# Patient Record
Sex: MALE | Race: WHITE | Employment: OTHER | URBAN - METROPOLITAN AREA
[De-identification: names, ages, dates, MRNs, and addresses within clinical notes are randomized per-mention and may not be internally consistent; named-entity substitution may affect disease eponyms.]

---

## 2017-03-20 ENCOUNTER — TRANSFERRED RECORDS (OUTPATIENT)
Dept: HEALTH INFORMATION MANAGEMENT | Facility: CLINIC | Age: 47
End: 2017-03-20

## 2017-03-24 ENCOUNTER — MYC MEDICAL ADVICE (OUTPATIENT)
Dept: INTERNAL MEDICINE | Facility: CLINIC | Age: 47
End: 2017-03-24

## 2017-03-27 NOTE — TELEPHONE ENCOUNTER
Routing to Dr. Macdonald, who has seen this patient most recently and for whom this message was intended.

## 2019-01-11 ENCOUNTER — HOSPITAL ENCOUNTER (EMERGENCY)
Facility: CLINIC | Age: 49
Discharge: HOME | End: 2019-01-11
Attending: EMERGENCY MEDICINE | Admitting: EMERGENCY MEDICINE
Payer: COMMERCIAL

## 2019-01-11 VITALS
BODY MASS INDEX: 27.15 KG/M2 | HEART RATE: 67 BPM | WEIGHT: 218.4 LBS | TEMPERATURE: 98 F | RESPIRATION RATE: 7 BRPM | HEIGHT: 75 IN | OXYGEN SATURATION: 98 % | DIASTOLIC BLOOD PRESSURE: 99 MMHG | SYSTOLIC BLOOD PRESSURE: 157 MMHG

## 2019-01-11 DIAGNOSIS — I10 BENIGN ESSENTIAL HYPERTENSION: ICD-10-CM

## 2019-01-11 LAB
ANION GAP SERPL CALCULATED.3IONS-SCNC: 7 MMOL/L (ref 3–14)
BASOPHILS # BLD AUTO: 0.1 10E9/L (ref 0–0.2)
BASOPHILS NFR BLD AUTO: 0.9 %
BUN SERPL-MCNC: 19 MG/DL (ref 7–30)
CALCIUM SERPL-MCNC: 9.1 MG/DL (ref 8.5–10.1)
CHLORIDE SERPL-SCNC: 103 MMOL/L (ref 94–109)
CO2 SERPL-SCNC: 29 MMOL/L (ref 20–32)
CREAT SERPL-MCNC: 1.19 MG/DL (ref 0.66–1.25)
DIFFERENTIAL METHOD BLD: NORMAL
EOSINOPHIL # BLD AUTO: 0.2 10E9/L (ref 0–0.7)
EOSINOPHIL NFR BLD AUTO: 2.9 %
ERYTHROCYTE [DISTWIDTH] IN BLOOD BY AUTOMATED COUNT: 12.8 % (ref 10–15)
GFR SERPL CREATININE-BSD FRML MDRD: 72 ML/MIN/{1.73_M2}
GLUCOSE SERPL-MCNC: 108 MG/DL (ref 70–99)
HCT VFR BLD AUTO: 43 % (ref 40–53)
HGB BLD-MCNC: 15.2 G/DL (ref 13.3–17.7)
IMM GRANULOCYTES # BLD: 0 10E9/L (ref 0–0.4)
IMM GRANULOCYTES NFR BLD: 0 %
INTERPRETATION ECG - MUSE: NORMAL
LYMPHOCYTES # BLD AUTO: 2.3 10E9/L (ref 0.8–5.3)
LYMPHOCYTES NFR BLD AUTO: 30.4 %
MCH RBC QN AUTO: 31.7 PG (ref 26.5–33)
MCHC RBC AUTO-ENTMCNC: 35.3 G/DL (ref 31.5–36.5)
MCV RBC AUTO: 90 FL (ref 78–100)
MONOCYTES # BLD AUTO: 0.3 10E9/L (ref 0–1.3)
MONOCYTES NFR BLD AUTO: 4.2 %
NEUTROPHILS # BLD AUTO: 4.7 10E9/L (ref 1.6–8.3)
NEUTROPHILS NFR BLD AUTO: 61.6 %
NRBC # BLD AUTO: 0 10*3/UL
NRBC BLD AUTO-RTO: 0 /100
PLATELET # BLD AUTO: 236 10E9/L (ref 150–450)
POTASSIUM SERPL-SCNC: 3.7 MMOL/L (ref 3.4–5.3)
RBC # BLD AUTO: 4.8 10E12/L (ref 4.4–5.9)
SODIUM SERPL-SCNC: 139 MMOL/L (ref 133–144)
WBC # BLD AUTO: 7.6 10E9/L (ref 4–11)

## 2019-01-11 PROCEDURE — 99284 EMERGENCY DEPT VISIT MOD MDM: CPT | Mod: 25

## 2019-01-11 PROCEDURE — 93005 ELECTROCARDIOGRAM TRACING: CPT

## 2019-01-11 PROCEDURE — 96374 THER/PROPH/DIAG INJ IV PUSH: CPT

## 2019-01-11 PROCEDURE — 25000128 H RX IP 250 OP 636: Performed by: EMERGENCY MEDICINE

## 2019-01-11 PROCEDURE — 80048 BASIC METABOLIC PNL TOTAL CA: CPT | Performed by: EMERGENCY MEDICINE

## 2019-01-11 PROCEDURE — 25000132 ZZH RX MED GY IP 250 OP 250 PS 637: Performed by: EMERGENCY MEDICINE

## 2019-01-11 PROCEDURE — 85025 COMPLETE CBC W/AUTO DIFF WBC: CPT | Performed by: EMERGENCY MEDICINE

## 2019-01-11 RX ORDER — LABETALOL HYDROCHLORIDE 5 MG/ML
20 INJECTION, SOLUTION INTRAVENOUS ONCE
Status: COMPLETED | OUTPATIENT
Start: 2019-01-11 | End: 2019-01-11

## 2019-01-11 RX ORDER — METOPROLOL TARTRATE 25 MG/1
50 TABLET, FILM COATED ORAL ONCE
Status: COMPLETED | OUTPATIENT
Start: 2019-01-11 | End: 2019-01-11

## 2019-01-11 RX ORDER — AMLODIPINE BESYLATE 5 MG/1
5 TABLET ORAL DAILY
Qty: 30 TABLET | Refills: 1 | Status: SHIPPED | OUTPATIENT
Start: 2019-01-11 | End: 2019-01-17

## 2019-01-11 RX ADMIN — LABETALOL HYDROCHLORIDE 20 MG: 5 INJECTION INTRAVENOUS at 20:03

## 2019-01-11 RX ADMIN — METOPROLOL TARTRATE 50 MG: 25 TABLET ORAL at 21:04

## 2019-01-11 ASSESSMENT — ENCOUNTER SYMPTOMS
SHORTNESS OF BREATH: 0
ABDOMINAL PAIN: 0
HEADACHES: 0

## 2019-01-11 ASSESSMENT — MIFFLIN-ST. JEOR
SCORE: 1919.54
SCORE: 1951.29

## 2019-01-11 NOTE — ED AVS SNAPSHOT
Emergency Department  [address] [place] [id number]  Phone:  [phone]  Fax:  [phone]                                    Lev Olvera   MRN: [id number]    Department:   Emergency Department   Date of Visit:  1/11/2019           After Visit Summary Signature Page    I have received my discharge instructions, and my questions have been answered. I have discussed any challenges I see with this plan with the nurse or doctor.    ..........................................................................................................................................  Patient/Patient Representative Signature      ..........................................................................................................................................  Patient Representative Print Name and Relationship to Patient    ..................................................               ................................................  Date                                   Time    ..........................................................................................................................................  Reviewed by Signature/Title    ...................................................              ..............................................  Date                                               Time          22EPIC Rev 08/18

## 2019-01-12 NOTE — ED PROVIDER NOTES
"  History     Chief Complaint:  Hypertension       HPI   Lev Olvera is a 48 year old male with a family history of CAD and hypertension who presents with his wife for evaluation of hypertension. The patient does not have a known personal history of hypertension, CAD, heart attack, or kidney problems. Of note, 2-3 years ago his primary physician noted that his blood pressure was borderline at 140/90. The patient denies any daily headaches, blurred vision, chest pain, shortness of breath, abdominal pain. He states that he returned home and mentioned to his wife that his ears were ringing so she took his blood pressure given that she is a health care professional and noticed that his blood pressure was elevated. The patient has a \"collapsed vein\" in his right leg for which he wears a compression stocking but also has leg swelling in his left leg.       Allergies:  Penicillins  Trazodone     Medications:    The patient is not currently taking any prescribed medications.    Past Medical History:    The patient does not have any past pertinent medical history.    Past Surgical History:    Tooth extraction    Family History:    Hypertension  CAD    Social History:  Smoking status: Never smoker  Alcohol use: Yes  Marital Status:         Review of Systems   Eyes: Negative for visual disturbance.   Respiratory: Negative for shortness of breath.    Cardiovascular: Positive for leg swelling. Negative for chest pain.   Gastrointestinal: Negative for abdominal pain.   Neurological: Negative for headaches.   All other systems reviewed and are negative.        Physical Exam     Patient Vitals for the past 24 hrs:   BP Temp Temp src Pulse Heart Rate Resp SpO2 Height Weight   01/11/19 2030 (!) 157/99 -- -- 67 67 (!) 7 -- -- --   01/11/19 2010 (!) 173/108 -- -- 71 77 19 -- -- --   01/11/19 2000 (!) 195/122 -- -- 74 74 13 -- -- --   01/11/19 1920 (!) 218/122 98  F (36.7  C) Oral -- 80 20 98 % 1.905 m (6' 3\") 99.1 kg (218 lb " "6.4 oz)   01/11/19 1919 (!) 218/122 -- -- 80 -- 16 98 % 1.854 m (6' 1\") 99.1 kg (218 lb 6.4 oz)         Physical Exam  General: Resting comfortably on the gurney  Head:  The scalp, face, and head appear normal  Eyes:  The pupils are equal, round, and reactive to light    There is no nystagmus    Extraocular muscles are intact    Conjunctivae and sclerae are normal  ENT:    The nose is normal    Pinnae are normal    The oropharynx is normal    Uvula is in the midline  Neck:  Normal range of motion    There is no rigidity noted    There is no midline cervical spine pain/tenderness    Trachea is in the midline    No mass is detected  CV:  Regular rate and underlying rhythm     Normal S1/S2, no S3/S4    No pathological murmur detected  Resp:  Lungs are clear    There is no tachypnea    Non-labored    No rales    No wheezing   GI:  Abdomen is soft, there is no rigidity    No distension    No tympani    No rebound tenderness     Non-surgical without peritoneal features  MS:  Normal muscular tone    Symmetric motor strength    No major joint effusions    No asymmetric leg swelling, no calf tenderness    1+ pretibial edema bilaterally   Skin:  No rash or acute skin lesions noted  Neuro: Speech is normal and fluent  Psych:  Awake. Alert.      Normal affect.  Appropriate interactions.  Lymph: No anterior cervical lymphadenopathy noted      Emergency Department Course   ECG (19:25:13):  Rate 77 bpm. NY interval 122. QRS duration 104. QT/QTc 374/423. P-R-T axes 65 24 62. Normal sinus rhythm, possible left atrial enlargement, Left ventricular hypertrophy, Abnormal ECG,  Interpreted at 2049 by Jaren Wheat MD.      Laboratory:  BMP: Glucose 108, WNL (Creatinine 1.19)  CBC: WNL (WBC 7.6, HGB 15.2, )    Interventions:  2003: Labetalol 20 mg IV  2044: Metoprolol 50 mg IV       Emergency Department Course:  Past medical records, nursing notes, and vitals reviewed.  1946: I performed an exam of the patient and obtained " history, as documented above.     IV inserted and blood drawn.    2041: I rechecked the patient.  Findings and plan explained to the Patient. Patient discharged home with instructions regarding supportive care, medications, and reasons to return. The importance of close follow-up was reviewed.     Impression & Plan      Medical Decision Making:  This patient presents with new onset hypertension.  He has a family history.  He is otherwise asymptomatic.  Screening laboratories show mild chronic renal impairment, this may be a result of untreated hypertension.  Other laboratories are normal.  Patient's blood pressure responded nicely to 1 dose of labetalol.  Blood pressure was approximately 150/99 after this dose.  I do not wish to drop his blood pressure any further.  I will start him on an antihypertensive agent at this time.  He does have mild 1+ bilateral pretibial edema which is chronic, this is useful information in the context of selecting future antihypertensive agents.  I will hold off on a diuretic at this time.  We will start on 5 mg of amlodipine and see where that gets us, a blood pressure log will be kept and follow-up with internal medicine is indicated.      Critical Care time:  none    Diagnosis:    ICD-10-CM    1. Benign essential hypertension I10    Hypertensive Urgency.    Disposition:  discharged to home    Discharge Medications:     Medication List      Started    amLODIPine 5 MG tablet  Commonly known as:  NORVASC  5 mg, Oral, DAILY              Tano Lees  1/11/2019    EMERGENCY DEPARTMENT    Scribe Disclosure:  I, Tano Lees, am serving as a scribe at 7:46 PM on 1/11/2019 to document services personally performed by Jaren Wheat MD based on my observations and the provider's statements to me.          Jaren Wheat MD  01/11/19 0554

## 2019-01-12 NOTE — ED NOTES
Bed: ED04  Expected date:   Expected time:   Means of arrival:   Comments:  Triage hypertension (after EKG)

## 2019-01-12 NOTE — DISCHARGE INSTRUCTIONS
Discharge Instructions  Hypertension - High Blood Pressure    During you visit to the Emergency Department, your blood pressure was higher than the recommended blood pressure.  This may be related to stress, pain, medication or other temporary conditions. In these cases, your blood pressure may return to normal on its own. If you have a history of high blood pressure, you may need to have your provider adjust your medications. Sometimes, your high measurement here may indicate that you have developed high blood pressure that will stay high unless it is treated. As a general rule, high blood pressure causes problems over years rather than days, weeks, or months. So, while it is important to treat blood pressure, it is rarely important to treat blood pressure immediately. Occasionally we will begin a medication in the Emergency Department; more often we will recommend close follow-up for medications with a primary doctor/clinic.    Generally, every Emergency Department visit should have a follow-up clinic visit with either a primary or a specialty clinic/provider. Please follow-up as instructed by your emergency provider today.    Return to the Emergency Department if you start to have:  A severe headache.  Chest pain.  Shortness of breath.  Weakness or numbness that affects one part of the body.  Confusion.  Vision changes.  Significant swelling of legs and/or eyes.  A reaction to any medication started in the Emergency Department.    What can I do to help myself?  Avoid alcohol.  Take any blood pressure medicine that you are prescribed.  Get a good night?s sleep.  Lower your salt intake.  Exercise.  Lose weight.  Manage stress.  See your doctor regularly    If blood pressure medication was started in the Emergency Department:  The medicine may not have an immediate effect. The body and brain determine what blood pressure you have. The medicine?s job is to retrain the body?s ?thermostat? to a lower blood  pressure.  You will need to follow up with your provider to see how this medicine is working for you.  If you were given a prescription for medicine here today, be sure to read all of the information (including the package insert) that comes with your prescription.  This will include important information about the medicine, its side effects, and any warnings that you need to know about.  The pharmacist who fills the prescription can provide more information and answer questions you may have about the medicine.  If you have questions or concerns that the pharmacist cannot address, please call or return to the Emergency Department.   Remember that you can always come back to the Emergency Department if you are not able to see your regular provider in the amount of time listed above, if you get any new symptoms, or if there is anything that worries you.      These keep a blood pressure log and bring into primary care

## 2019-01-17 ENCOUNTER — OFFICE VISIT (OUTPATIENT)
Dept: INTERNAL MEDICINE | Facility: CLINIC | Age: 49
End: 2019-01-17
Payer: COMMERCIAL

## 2019-01-17 ENCOUNTER — MYC MEDICAL ADVICE (OUTPATIENT)
Dept: INTERNAL MEDICINE | Facility: CLINIC | Age: 49
End: 2019-01-17

## 2019-01-17 VITALS
SYSTOLIC BLOOD PRESSURE: 173 MMHG | DIASTOLIC BLOOD PRESSURE: 100 MMHG | HEART RATE: 87 BPM | BODY MASS INDEX: 26.82 KG/M2 | WEIGHT: 214.6 LBS | TEMPERATURE: 97.8 F | RESPIRATION RATE: 16 BRPM | OXYGEN SATURATION: 98 %

## 2019-01-17 DIAGNOSIS — I10 BENIGN ESSENTIAL HYPERTENSION: Primary | ICD-10-CM

## 2019-01-17 PROCEDURE — 99214 OFFICE O/P EST MOD 30 MIN: CPT | Performed by: INTERNAL MEDICINE

## 2019-01-17 RX ORDER — LISINOPRIL 20 MG/1
20 TABLET ORAL DAILY
Qty: 30 TABLET | Refills: 3 | Status: SHIPPED | OUTPATIENT
Start: 2019-01-17 | End: 2019-05-18

## 2019-01-17 RX ORDER — ATENOLOL 50 MG/1
50 TABLET ORAL DAILY
Qty: 30 TABLET | Refills: 3 | Status: SHIPPED | OUTPATIENT
Start: 2019-01-17 | End: 2019-01-18

## 2019-01-17 NOTE — PROGRESS NOTES
SUBJECTIVE:   Lev Olvera is a 49 year old male who presents to clinic today for the following health issues:      ED/UC Followup:    Facility: [facility]  Date of visit: 2019  Reason for visit: Hypertension  Current Status: BP is still around 170/90. On the 6th day of amlodipine             Problem list and histories reviewed & adjusted, as indicated.  Additional history: as documented    Patient here for follow-up ER visit of nearly 1 week ago.  Patient had no previous diagnosed medical history, but was feeling severely on edge at home on .  This prompted his wife (rad tech [employer]) to check his blood pressure at home, and it was found to be 220/120.  He was seen in the ER, where blood pressure was similarly elevated to 170s-180 systolic.  EKG showed left ventricular hypertrophy changes, CBC and chem panel were unremarkable.  He was given a prescription for amlodipine, 5 mg daily and discharged.  He is here today having taken that medication for a week, with no appreciable change in his blood pressure.    Feels well today, no further headaches, no chest pain, no reduction in exertional capacity.  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  He has made a concerted effort in the last week to improve his diet, namely reducing salt intake.  He drinks alcohol socially, not daily.    Does have family history of hypertension in his father and possibly 1 of his siblings.  His father  of a heart attack in his 60s.    Reviewed and updated as needed this visit by clinical staff  Tobacco  Allergies  Meds  Problems  Med Hx  Surg Hx  Fam Hx       Reviewed and updated as needed this visit by Provider  Tobacco  Allergies  Meds  Problems  Med Hx  Surg Hx  Fam Hx         ROS:  Constitutional, HEENT, cardiovascular, pulmonary, GI, , musculoskeletal, neuro, skin, endocrine and psych systems are negative, except as otherwise  noted.    OBJECTIVE:     BP (!) 173/100   Pulse 87   Temp 97.8  F (36.6  C) (Oral)   Resp 16   Wt 97.3 kg (214 lb 9.6 oz)   SpO2 98%   BMI 26.82 kg/m    Body mass index is 26.82 kg/m .  GENERAL: healthy, alert and no distress  NECK: no adenopathy, no asymmetry, masses, or scars and thyroid normal to palpation  RESP: lungs clear to auscultation - no rales, rhonchi or wheezes  CV: regular rate and rhythm, normal S1 S2, no S3 or S4, no murmur, click or rub, no peripheral edema and peripheral pulses strong  ABDOMEN: soft, nontender, no hepatosplenomegaly, no masses and bowel sounds normal  MS: no gross musculoskeletal defects noted, no edema        ASSESSMENT/PLAN:     1. Benign essential hypertension  Discussed rationale for treating hypertension, including prevention of atherosclerotic vascular disease and endorgan damage.  Stop amlodipine, will start lisinopril and atenolol once daily.  Return in 3-4 weeks with previsit fasting labs as ordered below.  Given EKG changes we will check transthoracic echo.  Suspect mostly family history and lifestyle contributing to hypertension, but may need to consider workup for secondary hypertension depending on response to medications.  - lisinopril (PRINIVIL/ZESTRIL) 20 MG tablet; Take 1 tablet (20 mg) by mouth daily  Dispense: 30 tablet; Refill: 3  - atenolol (TENORMIN) 50 MG tablet; Take 1 tablet (50 mg) by mouth daily  Dispense: 30 tablet; Refill: 3  - Lipid panel reflex to direct LDL Fasting; Future  - TSH with free T4 reflex; Future  - Comprehensive metabolic panel; Future  - UA with Microscopic reflex to Culture; Future  - Echocardiogram Complete; Future          See Patient Instructions    Deejay De Los Santos MD  [facility]

## 2019-01-17 NOTE — PATIENT INSTRUCTIONS
- Stop taking amlodipine.  - Start taking lisinopril and atenolol once daily.  (Prescriptions have been sent to your pharmacy)     - Cardiology Clinic will contact you to schedule your cardiac echo.  Please contact us if you do not hear from them.     - Please follow-up with me in clinic in 1 month.  - Please come in for fasting labs, a few days prior to the appointment with me.

## 2019-01-17 NOTE — TELEPHONE ENCOUNTER
Called patient. /100 this AM after taking BP med. Asymptomatic. Denies chest pain, dizziness, headache, SOB, etc. Feeling good. BP has been around 170/100 since leaving ER. Appt made for today with Dr. De Los Santos.     Guideline used:  Telephone Triage Protocols for Nurses, Fifth Edition, Charis Peoples RN

## 2019-01-18 ENCOUNTER — MYC MEDICAL ADVICE (OUTPATIENT)
Dept: INTERNAL MEDICINE | Facility: CLINIC | Age: 49
End: 2019-01-18

## 2019-01-18 DIAGNOSIS — I10 BENIGN ESSENTIAL HYPERTENSION: ICD-10-CM

## 2019-01-18 RX ORDER — ATENOLOL 50 MG/1
25 TABLET ORAL DAILY
Qty: 30 TABLET | Refills: 3 | COMMUNITY
Start: 2019-01-18 | End: 2019-03-13 | Stop reason: ALTCHOICE

## 2019-01-18 NOTE — TELEPHONE ENCOUNTER
Provider please review Mychart message regarding BP improvement.     Patient wondering if he should continue current dose or perhaps lower the dose as he felt slight dizziness. -    -Currently on 50mg of Atenolol    BP Readings from Last 3 Encounters:   01/17/19 (!) 173/100   01/11/19 (!) 157/99   09/15/15 124/86       Deana SANCHEZ RN, BSN, PHN

## 2019-01-23 ENCOUNTER — HOSPITAL ENCOUNTER (OUTPATIENT)
Dept: CARDIOLOGY | Facility: CLINIC | Age: 49
Discharge: HOME | End: 2019-01-23
Attending: INTERNAL MEDICINE | Admitting: INTERNAL MEDICINE
Payer: COMMERCIAL

## 2019-01-23 DIAGNOSIS — I10 BENIGN ESSENTIAL HYPERTENSION: ICD-10-CM

## 2019-01-23 PROCEDURE — 25500064 ZZH RX 255 OP 636: Performed by: INTERNAL MEDICINE

## 2019-01-23 PROCEDURE — 40000264 ECHOCARDIOGRAM COMPLETE

## 2019-01-23 PROCEDURE — 93306 TTE W/DOPPLER COMPLETE: CPT | Mod: 26 | Performed by: INTERNAL MEDICINE

## 2019-01-23 RX ADMIN — HUMAN ALBUMIN MICROSPHERES AND PERFLUTREN 4 ML: 10; .22 INJECTION, SOLUTION INTRAVENOUS at 16:03

## 2019-01-24 ENCOUNTER — MYC MEDICAL ADVICE (OUTPATIENT)
Dept: INTERNAL MEDICINE | Facility: CLINIC | Age: 49
End: 2019-01-24

## 2019-01-24 DIAGNOSIS — R06.83 SNORING: Primary | ICD-10-CM

## 2019-02-25 ENCOUNTER — MYC MEDICAL ADVICE (OUTPATIENT)
Dept: INTERNAL MEDICINE | Facility: CLINIC | Age: 49
End: 2019-02-25

## 2019-03-12 ENCOUNTER — OFFICE VISIT (OUTPATIENT)
Dept: SLEEP MEDICINE | Facility: CLINIC | Age: 49
End: 2019-03-12
Attending: INTERNAL MEDICINE
Payer: COMMERCIAL

## 2019-03-12 VITALS
HEIGHT: 75 IN | OXYGEN SATURATION: 98 % | SYSTOLIC BLOOD PRESSURE: 148 MMHG | RESPIRATION RATE: 16 BRPM | HEART RATE: 51 BPM | BODY MASS INDEX: 26.61 KG/M2 | WEIGHT: 214 LBS | TEMPERATURE: 97.5 F | DIASTOLIC BLOOD PRESSURE: 90 MMHG

## 2019-03-12 DIAGNOSIS — R29.818 SUSPECTED SLEEP APNEA: Primary | ICD-10-CM

## 2019-03-12 DIAGNOSIS — G47.30 OBSERVED SLEEP APNEA: ICD-10-CM

## 2019-03-12 DIAGNOSIS — G47.19 EXCESSIVE DAYTIME SLEEPINESS: ICD-10-CM

## 2019-03-12 DIAGNOSIS — R06.83 SNORING: ICD-10-CM

## 2019-03-12 PROCEDURE — 99203 OFFICE O/P NEW LOW 30 MIN: CPT | Performed by: INTERNAL MEDICINE

## 2019-03-12 ASSESSMENT — MIFFLIN-ST. JEOR: SCORE: 1926.33

## 2019-03-12 NOTE — NURSING NOTE
"Chief Complaint   Patient presents with     Sleep Problem     Excessive daytime sleepiness.       Initial /90   Pulse 51   Temp 97.5  F (36.4  C) (Oral)   Resp 16   Ht 1.905 m (6' 3\")   Wt 97.1 kg (214 lb)   SpO2 98%   BMI 26.75 kg/m   Estimated body mass index is 26.75 kg/m  as calculated from the following:    Height as of this encounter: 1.905 m (6' 3\").    Weight as of this encounter: 97.1 kg (214 lb).    Medication Reconciliation: complete     ESS 12  Neck 44cm  Paola Guillen        "

## 2019-03-12 NOTE — PATIENT INSTRUCTIONS
Your BMI is Body mass index is 26.75 kg/m .  Weight management is a personal decision.  If you are interested in exploring weight loss strategies, the following discussion covers the approaches that may be successful. Body mass index (BMI) is one way to tell whether you are at a healthy weight, overweight, or obese. It measures your weight in relation to your height.  A BMI of 18.5 to 24.9 is in the healthy range. A person with a BMI of 25 to 29.9 is considered overweight, and someone with a BMI of 30 or greater is considered obese. More than two-thirds of American adults are considered overweight or obese.  Being overweight or obese increases the risk for further weight gain. Excess weight may lead to heart disease and diabetes.  Creating and following plans for healthy eating and physical activity may help you improve your health.  Weight control is part of healthy lifestyle and includes exercise, emotional health, and healthy eating habits. Careful eating habits lifelong are the mainstay of weight control. Though there are significant health benefits from weight loss, long-term weight loss with diet alone may be very difficult to achieve- studies show long-term success with dietary management in less than 10% of people. Attaining a healthy weight may be especially difficult to achieve in those with severe obesity. In some cases, medications, devices and surgical management might be considered.  What can you do?  If you are overweight or obese and are interested in methods for weight loss, you should discuss this with your provider.     Consider reducing daily calorie intake by 500 calories.     Keep a food journal.     Avoiding skipping meals, consider cutting portions instead.    Diet combined with exercise helps maintain muscle while optimizing fat loss. Strength training is particularly important for building and maintaining muscle mass. Exercise helps reduce stress, increase energy, and improves fitness.  Increasing exercise without diet control, however, may not burn enough calories to loose weight.       Start walking three days a week 10-20 minutes at a time    Work towards walking thirty minutes five days a week     Eventually, increase the speed of your walking for 1-2 minutes at time    In addition, we recommend that you review healthy lifestyles and methods for weight loss available through the National Institutes of Health patient information sites:  http://win.niddk.nih.gov/publications/index.htm    And look into health and wellness programs that may be available through your health insurance provider, employer, local community center, or hima club.    Weight management plan: Patient was referred to their PCP to discuss a diet and exercise plan.

## 2019-03-12 NOTE — PROGRESS NOTES
Sleep Consultation:    Date on this visit: 3/12/2019    Lev Olvera is sent by Deejay De Los Santos for a sleep consultation regarding snoring and possible sleep apnea.    Primary Physician: Deejay De Los Santos     Chief complaint: snoring, sleepiness     Presenting History:     Lev Olvera reports nightly snoring and poor quality of sleep and frequent daytime sleepiness for last 3 years.     Lev does snore every night. Patient does have a regular bed partner. There is report of snoring, gasping, snorting and poor quality of sleep.  He does have witnessed apneas. They occasionally sleep separately.  Patient sleeps on his back, side and stomach. He has frequent morning dry mouth, denies no morning headaches and restless legs. Lev denies any bruxism, sleep walking, sleep talking, dream enactment, sleep paralysis, cataplexy and hypnogogic/hypnopompic hallucinations.    Lev goes to sleep at 11:30 PM during the week. He wakes up at 6:30 AM with an alarm. He falls asleep in 10 minutes.  Lev denies difficulty falling asleep.  He wakes up 2-4 times a night for 10 minutes before falling back to sleep.  Lev wakes up to uncertain reasons and snort arousals.  On weekends, Lev goes to sleep at 11:30 PM.  He wakes up at 9:00 AM without an alarm. He falls asleep in 10 minutes.  Patient gets an average of 7 hours of sleep per night.     Lev does not do shift work.      He denies sleep walking as a child.  Lev has difficulty breathing through his nose.      Patient's [place] Sleepiness score 12/24 consistent with mild daytime sleepiness.      Lev naps 3-4 times per week for 30-60 minutes, feels refreshed after naps. He takes no inadvertant naps.  He denies closing eyes, dozing and falling asleep while driving. Patient was counseled on the importance of driving while alert, to pull over if drowsy, or nap before getting into the vehicle if sleepy.      He uses no caffeine.     Allergies:    Allergies   Allergen  Reactions     Penicillins Hives     Trazodone Hcl      dizzy       Medications:    Current Outpatient Medications   Medication Sig Dispense Refill     atenolol (TENORMIN) 50 MG tablet Take 0.5 tablets (25 mg) by mouth daily 30 tablet 3     lisinopril (PRINIVIL/ZESTRIL) 20 MG tablet Take 1 tablet (20 mg) by mouth daily 30 tablet 3       Problem List:  Patient Active Problem List    Diagnosis Date Noted     Benign essential hypertension 01/17/2019     Priority: Medium        Past Medical/Surgical History:  No past medical history on file.  Past Surgical History:   Procedure Laterality Date     SURGICAL HISTORY OF -       Tooth extraction       Social History:  Social History     Socioeconomic History     Marital status:      Spouse name: Not on file     Number of children: 0     Years of education: Not on file     Highest education level: Not on file   Occupational History     Not on file   Social Needs     Financial resource strain: Not on file     Food insecurity:     Worry: Not on file     Inability: Not on file     Transportation needs:     Medical: Not on file     Non-medical: Not on file   Tobacco Use     Smoking status: Never Smoker     Smokeless tobacco: Never Used   Substance and Sexual Activity     Alcohol use: Yes     Alcohol/week: 0.0 oz     Comment: occasional     Drug use: Not on file     Sexual activity: Yes     Partners: Female   Lifestyle     Physical activity:     Days per week: Not on file     Minutes per session: Not on file     Stress: Not on file   Relationships     Social connections:     Talks on phone: Not on file     Gets together: Not on file     Attends Caodaism service: Not on file     Active member of club or organization: Not on file     Attends meetings of clubs or organizations: Not on file     Relationship status: Not on file     Intimate partner violence:     Fear of current or ex partner: Not on file     Emotionally abused: Not on file     Physically abused: Not on file      Forced sexual activity: Not on file   Other Topics Concern     Parent/sibling w/ CABG, MI or angioplasty before 65F 55M? Not Asked   Social History Narrative     Not on file       Family History:  Family History   Problem Relation Age of Onset     Family History Negative Mother         alive age 60  Hx Ulcers     Hypertension Father      Coronary Artery Disease Father      Hypertension Brother         alive age 35     Family History Negative Sister         alive age 38     Glaucoma No family hx of      Macular Degeneration No family hx of        Review of Systems:  A complete review of systems reviewed by me is negative with the exeption of what has been mentioned in the history of present illness.  CONSTITUTIONAL: NEGATIVE for weight gain/loss, fever, chills, sweats or night sweats, drug allergies.  EYES: NEGATIVE for changes in vision, blind spots, double vision.  ENT: NEGATIVE for ear pain, sore throat, sinus pain, post-nasal drip, runny nose, bloody nose  CARDIAC: NEGATIVE for fast heartbeats or fluttering in chest, chest pain or pressure, breathlessness when lying flat, swollen legs or swollen feet.  NEUROLOGIC: NEGATIVE headaches, weakness or numbness in the arms or legs.  DERMATOLOGIC: NEGATIVE for rashes, new moles or change in mole(s)  PULMONARY: NEGATIVE SOB at rest, SOB with activity, dry cough, productive cough, coughing up blood, wheezing or whistling when breathing.    GASTROINTESTINAL: NEGATIVE for nausea or vomitting, loose or watery stools, fat or grease in stools, constipation, abdominal pain, bowel movements black in color or blood noted.  GENITOURINARY: NEGATIVE for pain during urination, blood in urine, urinating more frequently than usual, irregular menstrual periods.  MUSCULOSKELETAL: NEGATIVE for muscle pain, bone or joint pain, swollen joints.  ENDOCRINE: NEGATIVE for increased thirst or urination, diabetes.  LYMPHATIC: NEGATIVE for swollen lymph nodes, lumps or bumps in the breasts or  "nipple discharge.    Physical Examination:  Vitals: /90   Pulse 51   Temp 97.5  F (36.4  C) (Oral)   Resp 16   Ht 1.905 m (6' 3\")   Wt 97.1 kg (214 lb)   SpO2 98%   BMI 26.75 kg/m    BMI= Body mass index is 26.75 kg/m .    Neck Cir (cm): 44 cm    [place] Total Score 3/12/2019   Total score - [place] 12       JUDIE Total Score: 13 (03/12/19 1519)    GENERAL APPEARANCE: healthy, alert and no distress  EYES: Eyes grossly normal to inspection, PERRL and conjunctivae and sclerae normal  HENT: nose and mouth without ulcers or lesions, oropharynx crowded, uvula elongated and soft palate dependent  NECK: no adenopathy, no asymmetry, masses, or scars and thyroid normal to palpation  RESP: lungs clear to auscultation - no rales, rhonchi or wheezes  CV: regular rates and rhythm, normal S1 S2, no S3 or S4 and no murmur, click or rub  ABDOMEN: soft, nontender, without hepatosplenomegaly or masses and bowel sounds normal  MS: extremities normal- no gross deformities noted  NEURO: Normal strength and tone, mentation intact and speech normal  PSYCH: mentation appears normal and affect normal/bright  Mallampati Class: IV.  Tonsillar Stage: 1  hidden by pillars.    Impression/Plan:    1.  Probable obstructive sleep apnea   2. Hypertension   3. Excessive daytime sleepiness     - Patient, 49 years old male, with BMI 26, neck circumference 44 cm, presents with history of loud snoring, witnessed apneas and daytime sleepiness. Medical comorbidity includes hypertension. There is a high risk for sleep apnea and an overnight sleep study is recommended for evaluation.     Plan:     1. Split night PSG for evaluation of sleep apnea     He will follow up with me in approximately two weeks after his sleep study has been competed to review the results and discuss plan of care.       Polysomnography reviewed.  Obstructive sleep apnea reviewed.  Complications of untreated sleep apnea were reviewed.    Dr. Torsten Lundberg     CC: Deejay Poesy " Filiberto

## 2019-03-13 ENCOUNTER — OFFICE VISIT (OUTPATIENT)
Dept: INTERNAL MEDICINE | Facility: CLINIC | Age: 49
End: 2019-03-13
Payer: COMMERCIAL

## 2019-03-13 VITALS
OXYGEN SATURATION: 98 % | WEIGHT: 215 LBS | RESPIRATION RATE: 16 BRPM | BODY MASS INDEX: 26.87 KG/M2 | SYSTOLIC BLOOD PRESSURE: 119 MMHG | TEMPERATURE: 98 F | DIASTOLIC BLOOD PRESSURE: 90 MMHG | HEART RATE: 51 BPM

## 2019-03-13 DIAGNOSIS — I10 BENIGN ESSENTIAL HYPERTENSION: Primary | ICD-10-CM

## 2019-03-13 LAB
ALBUMIN SERPL-MCNC: 4.5 G/DL (ref 3.4–5)
ALBUMIN UR-MCNC: NEGATIVE MG/DL
ALP SERPL-CCNC: 87 U/L (ref 40–150)
ALT SERPL W P-5'-P-CCNC: 36 U/L (ref 0–70)
ANION GAP SERPL CALCULATED.3IONS-SCNC: 5 MMOL/L (ref 3–14)
APPEARANCE UR: CLEAR
AST SERPL W P-5'-P-CCNC: 28 U/L (ref 0–45)
BACTERIA #/AREA URNS HPF: ABNORMAL /HPF
BILIRUB SERPL-MCNC: 0.6 MG/DL (ref 0.2–1.3)
BILIRUB UR QL STRIP: NEGATIVE
BUN SERPL-MCNC: 23 MG/DL (ref 7–30)
CALCIUM SERPL-MCNC: 9.4 MG/DL (ref 8.5–10.1)
CHLORIDE SERPL-SCNC: 105 MMOL/L (ref 94–109)
CO2 SERPL-SCNC: 27 MMOL/L (ref 20–32)
COLOR UR AUTO: YELLOW
CREAT SERPL-MCNC: 1.16 MG/DL (ref 0.66–1.25)
GFR SERPL CREATININE-BSD FRML MDRD: 74 ML/MIN/{1.73_M2}
GLUCOSE SERPL-MCNC: 96 MG/DL (ref 70–99)
GLUCOSE UR STRIP-MCNC: NEGATIVE MG/DL
HGB UR QL STRIP: NEGATIVE
KETONES UR STRIP-MCNC: NEGATIVE MG/DL
LEUKOCYTE ESTERASE UR QL STRIP: NEGATIVE
NITRATE UR QL: NEGATIVE
PH UR STRIP: 6 PH (ref 5–7)
POTASSIUM SERPL-SCNC: 4.4 MMOL/L (ref 3.4–5.3)
PROT SERPL-MCNC: 8.2 G/DL (ref 6.8–8.8)
RBC #/AREA URNS AUTO: ABNORMAL /HPF
SODIUM SERPL-SCNC: 137 MMOL/L (ref 133–144)
SOURCE: ABNORMAL
SP GR UR STRIP: 1.02 (ref 1–1.03)
T4 FREE SERPL-MCNC: 0.82 NG/DL (ref 0.76–1.46)
TSH SERPL DL<=0.005 MIU/L-ACNC: 6.72 MU/L (ref 0.4–4)
UROBILINOGEN UR STRIP-ACNC: 0.2 EU/DL (ref 0.2–1)
WBC #/AREA URNS AUTO: ABNORMAL /HPF

## 2019-03-13 PROCEDURE — 80053 COMPREHEN METABOLIC PANEL: CPT | Performed by: INTERNAL MEDICINE

## 2019-03-13 PROCEDURE — 84443 ASSAY THYROID STIM HORMONE: CPT | Performed by: INTERNAL MEDICINE

## 2019-03-13 PROCEDURE — 99214 OFFICE O/P EST MOD 30 MIN: CPT | Performed by: INTERNAL MEDICINE

## 2019-03-13 PROCEDURE — 36415 COLL VENOUS BLD VENIPUNCTURE: CPT | Performed by: INTERNAL MEDICINE

## 2019-03-13 PROCEDURE — 84439 ASSAY OF FREE THYROXINE: CPT | Performed by: INTERNAL MEDICINE

## 2019-03-13 PROCEDURE — 81001 URINALYSIS AUTO W/SCOPE: CPT | Performed by: INTERNAL MEDICINE

## 2019-03-13 RX ORDER — AMLODIPINE BESYLATE 5 MG/1
5 TABLET ORAL DAILY
Qty: 30 TABLET | Refills: 3 | Status: SHIPPED | OUTPATIENT
Start: 2019-03-13 | End: 2019-08-18

## 2019-03-13 NOTE — PROGRESS NOTES
SUBJECTIVE:   Lev Olvera is a 49 year old male who presents to clinic today for the following health issues:      Hypertension Follow-up      Outpatient blood pressures are being checked at home.  Results are 132/85 and sometimes less    Low Salt Diet: no added salt      Amount of exercise or physical activity: count steps 8000 to 59904 steps a day    Problems taking medications regularly: No    Medication side effects: none    Diet: regular low salt    Pt has been feeling tired lately (doing a sleep study) not sure if it is from the sleep apnea or the blood pressure medications.        Problem list and histories reviewed & adjusted, as indicated.  Additional history:     Blood pressure greatly improved on lisinopril and atenolol, and as patient has instituted therapeutic lifestyle changes.  He has noted some fatigue, and possibly some sexual side effects since starting antihypertensive therapy.  He has visited with sleep clinic who plans sleep study in the near future.    Reviewed and updated as needed this visit by clinical staff  Tobacco  Allergies  Meds  Problems  Med Hx  Surg Hx  Fam Hx       Reviewed and updated as needed this visit by Provider  Tobacco  Allergies  Meds  Problems  Med Hx  Surg Hx  Fam Hx         ROS:  Constitutional, HEENT, cardiovascular, pulmonary, GI, , musculoskeletal, neuro, skin, endocrine and psych systems are negative, except as otherwise noted.    OBJECTIVE:     /90 (BP Location: Left arm, Patient Position: Chair, Cuff Size: Adult Regular)   Pulse 51   Temp 98  F (36.7  C) (Oral)   Resp 16   Wt 97.5 kg (215 lb)   SpO2 98%   BMI 26.87 kg/m    Body mass index is 26.87 kg/m .  GENERAL: healthy, alert and no distress  NECK: no adenopathy, no asymmetry, masses, or scars and thyroid normal to palpation  RESP: lungs clear to auscultation - no rales, rhonchi or wheezes  CV: regular rate and rhythm, normal S1 S2, no S3 or S4, no murmur, click or rub, no  peripheral edema and peripheral pulses strong  ABDOMEN: soft, nontender, no hepatosplenomegaly, no masses and bowel sounds normal  MS: no gross musculoskeletal defects noted, no edema        ASSESSMENT/PLAN:     1. Benign essential hypertension  Stop atenolol, replace with amlodipine.  Continue lisinopril.  Recheck follow-up labs as ordered today.  - amLODIPine (NORVASC) 5 MG tablet; Take 1 tablet (5 mg) by mouth daily  Dispense: 30 tablet; Refill: 3  - TSH with free T4 reflex  - Comprehensive metabolic panel  - UA with Microscopic reflex to Culture        Deejay De Los Santos MD  [facility]

## 2019-03-13 NOTE — PATIENT INSTRUCTIONS
- Stop taking Atenolol.  - Start taking amlodipine once daily.  (Prescription has been sent to your pharmacy)  - Continue lisinopril as you have been.    
28-Jan-2019 20:35:26

## 2019-05-18 DIAGNOSIS — I10 BENIGN ESSENTIAL HYPERTENSION: ICD-10-CM

## 2019-05-19 NOTE — TELEPHONE ENCOUNTER
"Requested Prescriptions   Pending Prescriptions Disp Refills     lisinopril (PRINIVIL/ZESTRIL) 20 MG tablet [Pharmacy Med Name: LISINOPRIL 20 MG TABLET] 30 tablet 3     Sig: TAKE 1 TABLET BY MOUTH EVERY DAY   Last Written Prescription Date:  1/17/2019  Last Fill Quantity: 30,  # refills: 3   Last Office Visit: 3/13/2019   Future Office Visit:         ACE Inhibitors (Including Combos) Protocol Failed - 5/18/2019  8:28 AM        Failed - Blood pressure under 140/90 in past 12 months     BP Readings from Last 3 Encounters:   03/13/19 119/90   03/12/19 148/90   01/17/19 (!) 173/100                 Passed - Recent (12 mo) or future (30 days) visit within the authorizing provider's specialty     Patient had office visit in the last 12 months or has a visit in the next 30 days with authorizing provider or within the authorizing provider's specialty.  See \"Patient Info\" tab in inbasket, or \"Choose Columns\" in Meds & Orders section of the refill encounter.              Passed - Medication is active on med list        Passed - Patient is age 18 or older        Passed - Normal serum creatinine on file in past 12 months     Recent Labs   Lab Test 03/13/19  1339   CR 1.16             Passed - Normal serum potassium on file in past 12 months     Recent Labs   Lab Test 03/13/19  1339   POTASSIUM 4.4               "

## 2019-05-22 RX ORDER — LISINOPRIL 20 MG/1
TABLET ORAL
Qty: 30 TABLET | Refills: 3 | Status: SHIPPED | OUTPATIENT
Start: 2019-05-22 | End: 2019-09-16

## 2019-06-21 ENCOUNTER — TELEPHONE (OUTPATIENT)
Dept: SLEEP MEDICINE | Facility: CLINIC | Age: 49
End: 2019-06-21

## 2019-06-23 ENCOUNTER — THERAPY VISIT (OUTPATIENT)
Dept: SLEEP MEDICINE | Facility: CLINIC | Age: 49
End: 2019-06-23
Payer: COMMERCIAL

## 2019-06-23 DIAGNOSIS — R29.818 SUSPECTED SLEEP APNEA: ICD-10-CM

## 2019-06-23 DIAGNOSIS — G47.19 EXCESSIVE DAYTIME SLEEPINESS: ICD-10-CM

## 2019-06-23 DIAGNOSIS — G47.33 OSA (OBSTRUCTIVE SLEEP APNEA): ICD-10-CM

## 2019-06-23 DIAGNOSIS — R06.83 SNORING: ICD-10-CM

## 2019-06-23 DIAGNOSIS — G47.30 OBSERVED SLEEP APNEA: ICD-10-CM

## 2019-06-23 PROCEDURE — 95811 POLYSOM 6/>YRS CPAP 4/> PARM: CPT | Performed by: INTERNAL MEDICINE

## 2019-06-25 ENCOUNTER — TELEPHONE (OUTPATIENT)
Dept: SLEEP MEDICINE | Facility: CLINIC | Age: 49
End: 2019-06-25

## 2019-06-25 DIAGNOSIS — G47.33 OSA (OBSTRUCTIVE SLEEP APNEA): Primary | ICD-10-CM

## 2019-06-25 LAB — SLPCOMP: NORMAL

## 2019-06-25 NOTE — PROCEDURES
"SLEEP STUDY INTERPRETATION  SPLIT NIGHT STUDY      Patient: SANDIE AGUAYO  YOB: 1970  Study Date: 6/23/2019  MRN: [id number]  Referring Provider: Deejay De Los Santos MD  Ordering Provider: Torsten Lundberg MD    Indications for Polysomnography: The patient is a 49 y old Male who is 6' 3\" and weighs 214.0 lbs. His BMI is 26.9, [place] sleepiness scale 12.0 and neck circumference is 44.0 cm. Relevant medical history includes hypertension. A diagnostic polysomnogram was performed to evaluate for sleep apnea. After 137.0 minutes of sleep time the patient exhibited sufficient respiratory events qualifying him for a CPAP trial which was then initiated.    Polysomnogram Data: A full night polysomnogram recorded the standard physiologic parameters including EEG, EOG, EMG, ECG, nasal and oral airflow. Respiratory parameters of chest and abdominal movements were recorded with respiratory inductance plethysmography. Oxygen saturation was recorded by pulse oximetry.  Hypopnea scoring rule used: 1B 4%    Diagnostic PSG  Sleep Architecture: Sleep was fragmented.   The total recording time of the polysomnogram was 184.1 minutes. The total sleep time was 137.0 minutes. Sleep latency was normal at 19.1 minutes without the use of a sleep aid. REM latency was 112.5 minutes. Arousal index was increased at 62.6 arousals per hour. Sleep efficiency was decreased at 74.4%. Wake after sleep onset was 24.5 minutes. The patient spent 21.2% of total sleep time in Stage N1, 52.9% in Stage N2, 10.6% in Stage N3, and 15.3% in REM. Time in REM supine was 21.0 minutes.    Respiration: Severe obstructive sleep apnea and associated oxygen desaturations.     Events ? The polysomnogram revealed a presence of 85 obstructive, - central, and 3 mixed apneas resulting in an apnea index of 38.5 events per hour. There were 14 obstructive hypopneas and - central hypopneas resulting in an obstructive hypopnea index of 6.1 and central hypopnea index of - " events per hour. The combined apnea/hypopnea index was 44.7 events per hour (central apnea/hypopnea index was - events per hour).  The REM AHI was 74.3 events per hour. The supine AHI was 44.7 events per hour. The RERA index was 4.4 events per hour. The RDI was 49.1 events per hour.    Snoring - was reported as mild to moderate.    Respiratory rate and pattern - was notable for normal respiratory rate and pattern.    Sustained Sleep Associated Hypoventilation - Transcutaneous carbon dioxide monitoring was not used; however significant hypoventilation was not suggested by oximetry.    Sleep Associated Hypoxemia - (Greater than 5 minutes O2 sat at or below 88%) was present. Baseline oxygen saturation was 94.6%. Lowest oxygen saturation was 67.1%. Time spent less than or equal to 88% was 10.6 minutes. Time spent less than or equal to 89% was 12.4 minutes.     Treatment PSG  Sleep Architecture: Improved with reduced arousals and increased REM.   At 01:49:41 AM the patient was placed on PAP treatment and was titrated at pressures ranging from CPAP 5 cmH2O up to CPAP 14 cmH2O. The total recording time of the treatment portion of the study was 259.9 minutes. The total sleep time was 239.5 minutes. During the treatment portion of the study the sleep latency was 2.5 minutes. REM latency was 34.0 minutes. Arousal index was increased at 27.8 arousals per hour. Sleep efficiency was normal at 92.2%. Wake after sleep onset was 18.0 minutes. The patient spent 6.3% of total sleep time in Stage N1, 46.1% in Stage N2, 18.0% in Stage N3, and 29.6% in REM. Time in REM supine was 38.5 minutes.     Respiration: CPAP was effective in treating sleep apnea.     The final pressure was CPAP 14 cmH2O with an AHI of 1.1 events per hour. Time in REM supine on final pressure was 5.0 minutes.     This titration was considered:  - Optimal (residual AHI < 5 events per hour and including REM?supine sleep at final pressure).     Movement Activity:  Mildly increased periodic limb movement activity without significant arousals seen n titration portion is likely to be clinically insignificant.     Periodic Limb Movements  o During the diagnostic portion of the study, there were 14 PLMs recorded. The PLM index was 6.1 movements per hour. The PLM Arousal Index was 2.6 per hour.  o During the treatment portion of the study, there were 104 PLMs recorded. The PLM index was 26.1 movements per hour. The PLM Arousal Index was 0.3 per hour.    REM EMG Activity - Excessive transient/sustained muscle activity was not present.    Nocturnal Behavior - Abnormal sleep related behaviors were not noted during/arising out of NREM / REM sleep.    Bruxism - None apparent.    Cardiac Summary: Sinus rhythm.   During the diagnostic portion of the study, the average pulse rate was 63.7 bpm. The minimum pulse rate was 53.1 bpm while the maximum pulse rate was 101.2 bpm.    During the treatment portion of the study, the average pulse rate was 62.9 bpm. The minimum pulse rate was 54.9 bpm while the maximum pulse rate was 96.3 bpm.     Arrhythmias were not noted.    Assessment:     Severe obstructive sleep apnea with associated oxygen desaturations and sleep fragmentation.     CPAP effectively treated sleep apnea, corrected hypoxemia and improved sleep architecture. A CPAP pressure of 5 cm H2O was optimal in lateral REM while pressures of 14 cm H2O was needed in supine REM.     Recommendations:    Treatment of KATHERINE with Auto?titrating PAP therapy with a range of 5 cmH2O to 15 cmH2O. Recommend clinical follow up with sleep management team, including review of compliance measures.    Diagnostic Codes:   Obstructive Sleep Apnea G47.33  Sleep Hypoxemia G47.36   Repetitive Intrusions Into Sleep F51.8      6/23/2019 [facility] Sleep Study (214.0 lbs) - AHI 44.7, RDI 49.1, Supine AHI 44.7, REM AHI 74.3, Low O2% 67.1%, Time Spent ?88% 10.6, Time Spent ?89% 12.4. Treatment was titrated to a  pressure of CPAP 14 with an AHI 1.1. Time spent in REM supine at this pressure was 5.0 minutes.    _____________________________________   Electronically Signed By: Torsten Lundberg MD 06/25/2019

## 2019-06-25 NOTE — TELEPHONE ENCOUNTER
Left message for patient to schedule a follow-up within two months as he is receiving his new c-pap machine.     Charis Fairchild

## 2019-06-25 NOTE — TELEPHONE ENCOUNTER
Phone Sleep Study Follow-Up:    Date on this visit: 6/25/2019    Lev Olvera was contacted today for follow-up of his sleep study done on 6/23/2019 at the [facility] for possible sleep apnea.    Sleep latency 19 minutes without Ambien.  REM achieved.   REM latency 112.5 minutes.  Sleep efficiency 74.4%. Total sleep time 137 minutes.    Sleep architecture:  Stage 1, 21.2% (5%), stage 2, 52.9% (45-55%), stage 3, 10.6% (15-20%), stage REM, 15.3% (20-25%).  AHI was 44.7, without desaturations. RDI 49.  REM AHI 74.3, consistent with severe REM KATHERINE.  Supine AHI 44.7, consistent with severe SUPINE KATHERINE.  Periodic Limb Movement Index 6/hour.       CPAP titration:  Sleep latency 2.5 minutes.  REM latency 34 minutes.  Sleep efficiency 92%. Total sleep time 239.5 minutes. Sleep architecture:  Stage 1, 6% (5%), stage 2, 46% (45-55%), stage 3, 18% (15-20%), stage REM, 29.6% (20-25%).  CPAP was titrated to 14 cm with  elimination of apneas, hypopneas and desaturations. Supine REM was noted at this pressure.  Periodic Limb Movement Index 26/hour.       These findings were reviewed with patient.     Past medical/surgical history, family history, social history, medications and allergies were reviewed.      Problem List:  Patient Active Problem List    Diagnosis Date Noted     KATHERINE (obstructive sleep apnea) 06/25/2019     Priority: Medium       6/23/2019 [facility] Sleep Study (214.0 lbs) - AHI 44.7, RDI 49.1, Supine AHI 44.7, REM AHI 74.3, Low O2% 67.1%, Time Spent ?88% 10.6, Time Spent ?89% 12.4. Treatment was titrated to a pressure of CPAP 14 with an AHI 1.1. Time spent in REM supine at this pressure was 5.0 minutes.       Benign essential hypertension 01/17/2019     Priority: Medium        Impression/Plan:    1. Obstructive sleep apnea, severe     - Patient was counseled regarding severe sleep apnea, consequences of untreated disease and management. Patients opts for CPAP therapy which is the  treatment of choice.     Plan:     1. Start auto PAP therapy   2. Follow up in 2 months       Dr. Torsten Lundberg     CC: Deejay De Los Santos

## 2019-07-01 ENCOUNTER — TELEPHONE (OUTPATIENT)
Dept: SLEEP MEDICINE | Facility: CLINIC | Age: 49
End: 2019-07-01

## 2019-07-01 DIAGNOSIS — G47.33 OSA (OBSTRUCTIVE SLEEP APNEA): ICD-10-CM

## 2019-08-18 DIAGNOSIS — I10 BENIGN ESSENTIAL HYPERTENSION: ICD-10-CM

## 2019-08-18 NOTE — TELEPHONE ENCOUNTER
"Requested Prescriptions   Pending Prescriptions Disp Refills     amLODIPine (NORVASC) 5 MG tablet [Pharmacy Med Name: AMLODIPINE BESYLATE 5 MG TAB] 30 tablet 3     Sig: TAKE 1 TABLET BY MOUTH EVERY DAY   Last Written Prescription Date:  3/13/2019  Last Fill Quantity: 30,  # refills: 3   Last Office Visit: 3/13/2019   Future Office Visit:         Calcium Channel Blockers Protocol  Failed - 8/18/2019  8:46 AM        Failed - Blood pressure under 140/90 in past 12 months     BP Readings from Last 3 Encounters:   03/13/19 119/90   03/12/19 148/90   01/17/19 (!) 173/100                 Passed - Recent (12 mo) or future (30 days) visit within the authorizing provider's specialty     Patient had office visit in the last 12 months or has a visit in the next 30 days with authorizing provider or within the authorizing provider's specialty.  See \"Patient Info\" tab in inbasket, or \"Choose Columns\" in Meds & Orders section of the refill encounter.              Passed - Medication is active on med list        Passed - Patient is age 18 or older        Passed - Normal serum creatinine on file in past 12 months     Recent Labs   Lab Test 03/13/19  1339   CR 1.16               "

## 2019-08-20 RX ORDER — AMLODIPINE BESYLATE 5 MG/1
TABLET ORAL
Qty: 30 TABLET | Refills: 3 | Status: SHIPPED | OUTPATIENT
Start: 2019-08-20 | End: 2019-11-15

## 2019-08-21 ENCOUNTER — MYC MEDICAL ADVICE (OUTPATIENT)
Dept: INTERNAL MEDICINE | Facility: CLINIC | Age: 49
End: 2019-08-21

## 2019-08-21 DIAGNOSIS — J34.2 NASAL SEPTAL DEVIATION: Primary | ICD-10-CM

## 2019-08-30 NOTE — TELEPHONE ENCOUNTER
FUTURE VISIT INFORMATION      FUTURE VISIT INFORMATION:    Date: 9/11/19    Time: 12:20PM    Location: [facility]  REFERRAL INFORMATION:    Referring provider:  Deejay De Los Santos MD    Referring providers clinic:  [facility]    Reason for visit/diagnosis  Nasal septal deviation     RECORDS REQUESTED FROM:       Clinic name Comments Records Status Imaging Status   [facility] 8/21/19 referral [facility] 6/23/19 notes [facility]    [facility] 3/20/17 notes with Dr Anupam Lau Scanned in EPIC

## 2019-09-03 ENCOUNTER — OFFICE VISIT (OUTPATIENT)
Dept: SLEEP MEDICINE | Facility: CLINIC | Age: 49
End: 2019-09-03
Payer: COMMERCIAL

## 2019-09-03 VITALS
HEART RATE: 86 BPM | WEIGHT: 218 LBS | OXYGEN SATURATION: 98 % | DIASTOLIC BLOOD PRESSURE: 77 MMHG | RESPIRATION RATE: 16 BRPM | BODY MASS INDEX: 27.1 KG/M2 | SYSTOLIC BLOOD PRESSURE: 115 MMHG | HEIGHT: 75 IN

## 2019-09-03 DIAGNOSIS — G47.33 OSA (OBSTRUCTIVE SLEEP APNEA): Primary | ICD-10-CM

## 2019-09-03 PROCEDURE — 99213 OFFICE O/P EST LOW 20 MIN: CPT | Performed by: INTERNAL MEDICINE

## 2019-09-03 ASSESSMENT — MIFFLIN-ST. JEOR: SCORE: 1944.47

## 2019-09-03 NOTE — PROGRESS NOTES
"  Obstructive Sleep Apnea - PAP Follow-Up Visit:    Chief Complaint   Patient presents with     CPAP Follow Up       Lev Olvera comes in today for follow-up of their severe sleep apnea, managed with CPAP.     PSG on 6/23/2019 showed an AHI of 44.7 per hour.     Overall, he rates the experience with PAP as 9 (0 poor, 10 great). The mask is comfortable. The mask is not leaking.  He is not snoring with the mask on. He is not having gasp arousals.  He is not having significant oral/nasal dryness. The pressure settings are comfortable.     He uses full-face mask.     Bedtime is typically 11 pm. Usually it takes about 10 minutes to fall asleep with the mask on. Wake time is typically 6:30 am.  Patient is using PAP therapy 6-7 hours per night. The patient is usually getting 7 hours of sleep per night.    He does feel rested in the morning.    [place] Sleepiness Scale: 5/24    ResMed     Auto-PAP 5-15 cmH2O download:  29/30 total days of use. 1 nonuse days. 27/30 days with >4 hours use.  Average use 6 hours 35 minutes per day. Median Leak 1.4 L/min. 95%ile Leak 17.6 L/min. CPAP 95% pressure 14.6cm. AHI 5.2      Reviewed by team: Tobacco       Reviewed by provider:        Problem List:  Patient Active Problem List    Diagnosis Date Noted     KATHERINE (obstructive sleep apnea) 06/25/2019     Priority: Medium       6/23/2019 [facility] Sleep Study (214.0 lbs) - AHI 44.7, RDI 49.1, Supine AHI 44.7, REM AHI 74.3, Low O2% 67.1%, Time Spent ?88% 10.6, Time Spent ?89% 12.4. Treatment was titrated to a pressure of CPAP 14 with an AHI 1.1. Time spent in REM supine at this pressure was 5.0 minutes.       Benign essential hypertension 01/17/2019     Priority: Medium          /77   Pulse 86   Resp 16   Ht 1.905 m (6' 3\")   Wt 98.9 kg (218 lb)   SpO2 98%   BMI 27.25 kg/m      Impression/Plan:     Severe sleep apnea.     -  Tolerating PAP well. Daytime symptoms are improved. Patient had some difficulty tolerating CPAP in " the beginning but is now used to ot and uses therapy regularly. Regular compliance and satisfactory AHI is demonstrated on download.     Plan:     1. Continue auto PAP therapy     Lev J Olvera will follow up in about 1 year(s).     Fifteen minutes spent with patient, all of which were spent face-to-face counseling, consulting, coordinating plan of care.      Torsten Lundberg MD, MD    CC:  Deejay De Los Santos,

## 2019-09-03 NOTE — NURSING NOTE
"Chief Complaint   Patient presents with     CPAP Follow Up       Initial /77   Pulse 86   Resp 16   Ht 1.905 m (6' 3\")   Wt 98.9 kg (218 lb)   SpO2 98%   BMI 27.25 kg/m   Estimated body mass index is 27.25 kg/m  as calculated from the following:    Height as of this encounter: 1.905 m (6' 3\").    Weight as of this encounter: 98.9 kg (218 lb).    Medication Reconciliation: complete     ESS 7  Paola Guillen CMA      "

## 2019-09-03 NOTE — PATIENT INSTRUCTIONS
Your Body mass index is 27.25 kg/m .  Weight management is a personal decision.  If you are interested in exploring weight loss strategies, the following discussion covers the approaches that may be successful. Body mass index (BMI) is one way to tell whether you are at a healthy weight, overweight, or obese. It measures your weight in relation to your height.  A BMI of 18.5 to 24.9 is in the healthy range. A person with a BMI of 25 to 29.9 is considered overweight, and someone with a BMI of 30 or greater is considered obese. More than two-thirds of American adults are considered overweight or obese.  Being overweight or obese increases the risk for further weight gain. Excess weight may lead to heart disease and diabetes.  Creating and following plans for healthy eating and physical activity may help you improve your health.  Weight control is part of healthy lifestyle and includes exercise, emotional health, and healthy eating habits. Careful eating habits lifelong are the mainstay of weight control. Though there are significant health benefits from weight loss, long-term weight loss with diet alone may be very difficult to achieve- studies show long-term success with dietary management in less than 10% of people. Attaining a healthy weight may be especially difficult to achieve in those with severe obesity. In some cases, medications, devices and surgical management might be considered.  What can you do?  If you are overweight or obese and are interested in methods for weight loss, you should discuss this with your provider.     Consider reducing daily calorie intake by 500 calories.     Keep a food journal.     Avoiding skipping meals, consider cutting portions instead.    Diet combined with exercise helps maintain muscle while optimizing fat loss. Strength training is particularly important for building and maintaining muscle mass. Exercise helps reduce stress, increase energy, and improves fitness.  Increasing exercise without diet control, however, may not burn enough calories to loose weight.       Start walking three days a week 10-20 minutes at a time    Work towards walking thirty minutes five days a week     Eventually, increase the speed of your walking for 1-2 minutes at time    In addition, we recommend that you review healthy lifestyles and methods for weight loss available through the National Institutes of Health patient information sites:  http://win.niddk.nih.gov/publications/index.htm    And look into health and wellness programs that may be available through your health insurance provider, employer, local community center, or hima club.

## 2019-09-11 ENCOUNTER — PRE VISIT (OUTPATIENT)
Dept: OTOLARYNGOLOGY | Facility: CLINIC | Age: 49
End: 2019-09-11

## 2019-09-11 ENCOUNTER — OFFICE VISIT (OUTPATIENT)
Dept: OTOLARYNGOLOGY | Facility: CLINIC | Age: 49
End: 2019-09-11
Attending: INTERNAL MEDICINE
Payer: COMMERCIAL

## 2019-09-11 VITALS
DIASTOLIC BLOOD PRESSURE: 100 MMHG | WEIGHT: 219 LBS | SYSTOLIC BLOOD PRESSURE: 160 MMHG | BODY MASS INDEX: 27.37 KG/M2 | HEART RATE: 66 BPM

## 2019-09-11 DIAGNOSIS — J34.2 DEVIATED NASAL SEPTUM: ICD-10-CM

## 2019-09-11 DIAGNOSIS — J34.829 NASAL VALVE COLLAPSE: ICD-10-CM

## 2019-09-11 DIAGNOSIS — J34.89 NASAL OBSTRUCTION: Primary | ICD-10-CM

## 2019-09-11 ASSESSMENT — PAIN SCALES - GENERAL: PAINLEVEL: NO PAIN (0)

## 2019-09-11 NOTE — LETTER
9/11/2019       RE: Lev Olvera  [address]  [facility] [address]     Dear Colleague,    Thank you for referring your patient, Lev Olvera, to the [facility] EAR NOSE [facility] THROAT at [facility]. Please see a copy of my visit note below.    Facial Plastic and Reconstructive Surgery Consultation    Referring Provider:   Deejay De Los Santos MD  [address] [place] [address]    HPI:   I had the pleasure of seeing Lev Olvera today in clinic for consultation for nasal obstruction and septal deviation.       Lev Olvera is a 49 year old male with severe KATHERINE with and AHI of 44.   He has constant persistent nasal obstruction on the left side.  This is been as long as he has ever known it.  He states that if he gets any congestion the left side does not even get any air.  He does not recall any significant trauma in the past.  He does not complain of any allergies.  He is never had nasal surgery.  He does state he has tried a nasal steroid spray in the past has not led to any improvement.    He is here with his wife to discuss potential interventions to his nose.  He also discusses that on the same side on the left he feels like he can hear his voice louder and the is fluid in his ear.  This has been a persistent bothersome symptom.  His wife also notes that she feels that his speech is more hypernasal.  He notes he is a constant mouth breather.  For this he has to wear a full mask for his CPAP machine.  He does use a CPAP machine daily.      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      Review Of Systems  ROS: 10 point ROS neg other than the symptoms noted above in the HPI.    Patient Active Problem List   Diagnosis     Benign essential hypertension     KATHERINE (obstructive sleep apnea)     Past Surgical History:   Procedure Laterality Date     SURGICAL HISTORY OF -       Tooth extraction     Current  Outpatient Medications   Medication Sig Dispense Refill     amLODIPine (NORVASC) 5 MG tablet TAKE 1 TABLET BY MOUTH EVERY DAY 30 tablet 3     lisinopril (PRINIVIL/ZESTRIL) 20 MG tablet TAKE 1 TABLET BY MOUTH EVERY DAY 30 tablet 3     Penicillins and Trazodone hcl  Social History     Socioeconomic History     Marital status:      Spouse name: Not on file     Number of children: 0     Years of education: Not on file     Highest education level: Not on file   Occupational History     Not on file   Social Needs     Financial resource strain: Not on file     Food insecurity:     Worry: Not on file     Inability: Not on file     Transportation needs:     Medical: Not on file     Non-medical: Not on file   Tobacco Use     Smoking status: Never Smoker     Smokeless tobacco: Never Used   Substance and Sexual Activity     Alcohol use: Yes     Alcohol/week: 0.0 oz     Comment: occasional     Drug use: Not on file     Sexual activity: Yes     Partners: Female   Lifestyle     Physical activity:     Days per week: Not on file     Minutes per session: Not on file     Stress: Not on file   Relationships     Social connections:     Talks on phone: Not on file     Gets together: Not on file     Attends Yazidism service: Not on file     Active member of club or organization: Not on file     Attends meetings of clubs or organizations: Not on file     Relationship status: Not on file     Intimate partner violence:     Fear of current or ex partner: Not on file     Emotionally abused: Not on file     Physically abused: Not on file     Forced sexual activity: Not on file   Other Topics Concern     Parent/sibling w/ CABG, MI or angioplasty before 65F 55M? Not Asked   Social History Narrative     Not on file     Family History   Problem Relation Age of Onset     Family History Negative Mother         alive age 60  Hx Ulcers     Hypertension Father      Coronary Artery Disease Father      Hypertension Brother         alive age 35      Family History Negative Sister         alive age 38     Glaucoma No family hx of      Macular Degeneration No family hx of        PE:  The patient is alert and oriented and makes good eye contact in clinic.  He does have a slight nasal quality to his speech however this is not significantly notable.  His pupils are equally round and reactive to light.  His extraocular movements are intact.  Evaluation of the face demonstrates that he is fair skinned with a Buenrostro 2 score.  He has notable sun damage.  There is evidence of significant external nasal deformity.  His dorsum is abnormally flat with a notable bony ridge on the right side.  His nose is also deviated with a collapse of the left mid vault.  Anterior rhinoscopy really even before a rhinoscopy on based view it is notable that the caudal septum is sitting in his left nasal airway blocking his left nostril.  Speculum has to be used to move the caudal septum out of the way in order to evaluate his nasal airway.  That demonstrates that the septum is completely deviated to the left and in contact with the inferior turbinate.  The left anterior inferior turbinate is enlarged.  I am not able to visualize past this anterior obstruction and cannot see the middle meatus.  On the right side he is widely patent.  You can see the septum is deviated off to the left.  He has nasal valve collapse on the right where modified Hinds maneuver leads to significant improvement in his nasal breathing on that side when the lateral nasal wall is supported.  I do no note any mucopurulence or polyps or any masses or obstructions on the side.  Endoscopic evaluation demonstrates a little bit of clear fluid bilaterally but no significant pathology in either ear canal tympanic membranes are visualized through the drums bilaterally.  Oral examination demonstrates these mallampati 3, he has a large neck circumference.       IMPRESSION:  Severe septal deviation with nasal  obstruction  Left enlarged inferior turbinate  Right nasal valve collapse  Obstructive sleep apnea with CPAP dependence  Exam suggestive of acquired nasal deformity from previous nasal trauma.      PLAN:  Lev Olvera presents today for consultation for nasal obstruction. He is significantly debilitated by the inability to effectively move air through his nose. There are visible structural deficits that would not be improved with medical therapy. The noted deformities on exam require surgical correction. These would not be addressed or corrected with a septoplasty alone, as the structural deficits arise in the dorsal L strut supportive aspect of the septum. Noted deformities on exam result from significant trauma leading to external and internal deformities affecting the form and function of the nose. The findings are also resultant from the acquired deformity of the nasal bones from the fracture.     He would require for his nasal dorsum osteotomies bilaterally with an intermediate one on the right and rasping to get his nasal bone straight and repaired the deformity.  He would also require a cartilage graft to the nasal dorsum to correct the flat roof.  He is unsure he would want to proceed with these parts of the surgery to note.  He subsequently would require bilateral  grafts in the right to support the nasal valve in the left in order to maintain the septum intact.  He would require possible caudal septal replacement or caudal septal repositioning with an extended  on the left.  He require inferior turbinate reduction bilaterally.    We discussed the perioperative time period of surgery.  We discussed to be not be able to use his CPAP in the healing time.  We also discussed he would need to be observed in the hospital for 24 hours after surgery.  If we know he continues to have significant desaturations at that time he may be placed on supplemental oxygen at home.  I suspect he would not be  able to use his CPAP for at least 3 weeks possibly a month after surgery.    I did discuss the possible need for chondral cartilage ear graft as his septum is quite deformed and may not be useful for grafting.      Photodocumentation was obtained.     I spent a total of 45 minutes face-to-face with Lev Olvera during today's office visit.  Over 50% of this time was spent counseling the patient and/or coordinating care regarding severe septal deviation.  See note for details.        Again, thank you for allowing me to participate in the care of your patient.      Sincerely,    Alberta Driscoll MD

## 2019-09-11 NOTE — NURSING NOTE
Chief Complaint   Patient presents with     Consult     Deviated septum, difficulty breathing     Blood pressure (!) 160/100, pulse 66, weight 99.3 kg (219 lb).    Paola Patiño, EMT

## 2019-09-11 NOTE — PROGRESS NOTES
Facial Plastic and Reconstructive Surgery Consultation    Referring Provider:   Deejay De Los Santos MD  [address] [place] [id number]    HPI:   I had the pleasure of seeing Lev Olvera today in clinic for consultation for nasal obstruction and septal deviation.       Lev Olvera is a 49 year old male with severe KATHERINE with and AHI of 44.   He has constant persistent nasal obstruction on the left side.  This is been as long as he has ever known it.  He states that if he gets any congestion the left side does not even get any air.  He does not recall any significant trauma in the past.  He does not complain of any allergies.  He is never had nasal surgery.  He does state he has tried a nasal steroid spray in the past has not led to any improvement.    He is here with his wife to discuss potential interventions to his nose.  He also discusses that on the same side on the left he feels like he can hear his voice louder and the is fluid in his ear.  This has been a persistent bothersome symptom.  His wife also notes that she feels that his speech is more hypernasal.  He notes he is a constant mouth breather.  For this he has to wear a full mask for his CPAP machine.  He does use a CPAP machine daily.      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      Review Of Systems  ROS: 10 point ROS neg other than the symptoms noted above in the HPI.    Patient Active Problem List   Diagnosis     Benign essential hypertension     KATHERINE (obstructive sleep apnea)     Past Surgical History:   Procedure Laterality Date     SURGICAL HISTORY OF -       Tooth extraction     Current Outpatient Medications   Medication Sig Dispense Refill     amLODIPine (NORVASC) 5 MG tablet TAKE 1 TABLET BY MOUTH EVERY DAY 30 tablet 3     lisinopril (PRINIVIL/ZESTRIL) 20 MG tablet TAKE 1 TABLET BY MOUTH EVERY DAY 30 tablet 3     Penicillins and Trazodone hcl  Social History     Socioeconomic  History     Marital status:      Spouse name: Not on file     Number of children: 0     Years of education: Not on file     Highest education level: Not on file   Occupational History     Not on file   Social Needs     Financial resource strain: Not on file     Food insecurity:     Worry: Not on file     Inability: Not on file     Transportation needs:     Medical: Not on file     Non-medical: Not on file   Tobacco Use     Smoking status: Never Smoker     Smokeless tobacco: Never Used   Substance and Sexual Activity     Alcohol use: Yes     Alcohol/week: 0.0 oz     Comment: occasional     Drug use: Not on file     Sexual activity: Yes     Partners: Female   Lifestyle     Physical activity:     Days per week: Not on file     Minutes per session: Not on file     Stress: Not on file   Relationships     Social connections:     Talks on phone: Not on file     Gets together: Not on file     Attends Alevism service: Not on file     Active member of club or organization: Not on file     Attends meetings of clubs or organizations: Not on file     Relationship status: Not on file     Intimate partner violence:     Fear of current or ex partner: Not on file     Emotionally abused: Not on file     Physically abused: Not on file     Forced sexual activity: Not on file   Other Topics Concern     Parent/sibling w/ CABG, MI or angioplasty before 65F 55M? Not Asked   Social History Narrative     Not on file     Family History   Problem Relation Age of Onset     Family History Negative Mother         alive age 60  Hx Ulcers     Hypertension Father      Coronary Artery Disease Father      Hypertension Brother         alive age 35     Family History Negative Sister         alive age 38     Glaucoma No family hx of      Macular Degeneration No family hx of        PE:  The patient is alert and oriented and makes good eye contact in clinic.  He does have a slight nasal quality to his speech however this is not significantly  notable.  His pupils are equally round and reactive to light.  His extraocular movements are intact.  Evaluation of the face demonstrates that he is fair skinned with a Buenrostro 2 score.  He has notable sun damage.  There is evidence of significant external nasal deformity.  His dorsum is abnormally flat with a notable bony ridge on the right side.  His nose is also deviated with a collapse of the left mid vault.  Anterior rhinoscopy really even before a rhinoscopy on based view it is notable that the caudal septum is sitting in his left nasal airway blocking his left nostril.  Speculum has to be used to move the caudal septum out of the way in order to evaluate his nasal airway.  That demonstrates that the septum is completely deviated to the left and in contact with the inferior turbinate.  The left anterior inferior turbinate is enlarged.  I am not able to visualize past this anterior obstruction and cannot see the middle meatus.  On the right side he is widely patent.  You can see the septum is deviated off to the left.  He has nasal valve collapse on the right where modified Cynthia maneuver leads to significant improvement in his nasal breathing on that side when the lateral nasal wall is supported.  I do no note any mucopurulence or polyps or any masses or obstructions on the side.  Endoscopic evaluation demonstrates a little bit of clear fluid bilaterally but no significant pathology in either ear canal tympanic membranes are visualized through the drums bilaterally.  Oral examination demonstrates these mallampati 3, he has a large neck circumference.       IMPRESSION:  Severe septal deviation with nasal obstruction  Left enlarged inferior turbinate  Right nasal valve collapse  Obstructive sleep apnea with CPAP dependence  Exam suggestive of acquired nasal deformity from previous nasal trauma.      PLAN:  Lev Olvera presents today for consultation for nasal obstruction. He is significantly debilitated  by the inability to effectively move air through his nose. There are visible structural deficits that would not be improved with medical therapy. The noted deformities on exam require surgical correction. These would not be addressed or corrected with a septoplasty alone, as the structural deficits arise in the dorsal L strut supportive aspect of the septum. Noted deformities on exam result from significant trauma leading to external and internal deformities affecting the form and function of the nose. The findings are also resultant from the acquired deformity of the nasal bones from the fracture.     He would require for his nasal dorsum osteotomies bilaterally with an intermediate one on the right and rasping to get his nasal bone straight and repaired the deformity.  He would also require a cartilage graft to the nasal dorsum to correct the flat roof.  He is unsure he would want to proceed with these parts of the surgery to note.  He subsequently would require bilateral  grafts in the right to support the nasal valve in the left in order to maintain the septum intact.  He would require possible caudal septal replacement or caudal septal repositioning with an extended  on the left.  He require inferior turbinate reduction bilaterally.    We discussed the perioperative time period of surgery.  We discussed to be not be able to use his CPAP in the healing time.  We also discussed he would need to be observed in the hospital for 24 hours after surgery.  If we know he continues to have significant desaturations at that time he may be placed on supplemental oxygen at home.  I suspect he would not be able to use his CPAP for at least 3 weeks possibly a month after surgery.    I did discuss the possible need for chondral cartilage ear graft as his septum is quite deformed and may not be useful for grafting.      Photodocumentation was obtained.     I spent a total of 45 minutes face-to-face with Lev STEWART  Wade during today's office visit.  Over 50% of this time was spent counseling the patient and/or coordinating care regarding severe septal deviation.  See note for details.

## 2019-09-12 DIAGNOSIS — J34.2 DEVIATED NASAL SEPTUM: ICD-10-CM

## 2019-09-12 DIAGNOSIS — J34.89 NASAL OBSTRUCTION: ICD-10-CM

## 2019-09-12 DIAGNOSIS — J34.829 NASAL VALVE COLLAPSE: Primary | ICD-10-CM

## 2019-09-12 RX ORDER — CLINDAMYCIN PHOSPHATE 900 MG/50ML
900 INJECTION, SOLUTION INTRAVENOUS
Status: CANCELLED | OUTPATIENT
Start: 2019-09-12

## 2019-09-12 RX ORDER — CLINDAMYCIN PHOSPHATE 900 MG/50ML
900 INJECTION, SOLUTION INTRAVENOUS SEE ADMIN INSTRUCTIONS
Status: CANCELLED | OUTPATIENT
Start: 2019-09-12

## 2019-09-12 NOTE — NURSING NOTE
Photodocumentation obtained.  Will work to get PA and then schedule surgery.    Pre op teaching discussed.  Gave soap.    Adalid Brennan RN  9/12/2019 9:45 AM

## 2019-09-16 DIAGNOSIS — I10 BENIGN ESSENTIAL HYPERTENSION: ICD-10-CM

## 2019-09-16 RX ORDER — LISINOPRIL 20 MG/1
TABLET ORAL
Qty: 30 TABLET | Refills: 3 | Status: SHIPPED | OUTPATIENT
Start: 2019-09-16 | End: 2019-11-15

## 2019-09-16 NOTE — TELEPHONE ENCOUNTER
"Requested Prescriptions   Pending Prescriptions Disp Refills     lisinopril (PRINIVIL/ZESTRIL) 20 MG tablet [Pharmacy Med Name: LISINOPRIL 20 MG TABLET] 30 tablet 3     Sig: TAKE 1 TABLET BY MOUTH EVERY DAY       ACE Inhibitors (Including Combos) Protocol Failed - 9/16/2019  7:32 AM        Failed - Blood pressure under 140/90 in past 12 months     BP Readings from Last 3 Encounters:   09/11/19 (!) 160/100   09/03/19 115/77   03/13/19 119/90                 Passed - Recent (12 mo) or future (30 days) visit within the authorizing provider's specialty     Patient had office visit in the last 12 months or has a visit in the next 30 days with authorizing provider or within the authorizing provider's specialty.  See \"Patient Info\" tab in inbasket, or \"Choose Columns\" in Meds & Orders section of the refill encounter.              Passed - Medication is active on med list        Passed - Patient is age 18 or older        Passed - Normal serum creatinine on file in past 12 months     Recent Labs   Lab Test 03/13/19  1339   CR 1.16             Passed - Normal serum potassium on file in past 12 months     Recent Labs   Lab Test 03/13/19  1339   POTASSIUM 4.4             Routing refill request to provider for review/approval because:  out of range:  blood pressure          "

## 2019-10-01 ENCOUNTER — HEALTH MAINTENANCE LETTER (OUTPATIENT)
Age: 49
End: 2019-10-01

## 2019-10-15 ENCOUNTER — TELEPHONE (OUTPATIENT)
Dept: OTOLARYNGOLOGY | Facility: CLINIC | Age: 49
End: 2019-10-15

## 2019-10-15 NOTE — TELEPHONE ENCOUNTER
Patient's wife called regarding scheduling. She stated she received auth from insurance company. Sent a message to Rosalinda in regards to approval.     Told Cesia, I will follow up with her once I hear back from our prior auth department whether to go ahead with scheduling surgery.

## 2019-10-21 ENCOUNTER — MYC MEDICAL ADVICE (OUTPATIENT)
Dept: OTOLARYNGOLOGY | Facility: CLINIC | Age: 49
End: 2019-10-21

## 2019-10-22 NOTE — TELEPHONE ENCOUNTER
Called wife back to let her know that I received a message from prior auth stating that Lev is approved at  OR until 12/31/2019.   Lev is on the waitlist for surgery 11/18 and 11/26, which ever opens up soonest they would like to take.     She expressed concern and also frustration that she would know at the last minute. I did tell her that Dr. Shamika Coles is available and I am just waiting for an OR to open up. She asked if this could be done elsewhere at [facility] or [facility] OR I did explain to her that this needs to be done at the hospital and that is where the auth is good for.   I explained that I understand the frustration of not know and short notice because of work. I did explain to her that I will call her asap once I know.    Will folllow up at the earliest avail date.

## 2019-10-31 ENCOUNTER — PREP FOR PROCEDURE (OUTPATIENT)
Dept: OTOLARYNGOLOGY | Facility: CLINIC | Age: 49
End: 2019-10-31

## 2019-10-31 ENCOUNTER — TELEPHONE (OUTPATIENT)
Dept: OTOLARYNGOLOGY | Facility: CLINIC | Age: 49
End: 2019-10-31

## 2019-10-31 DIAGNOSIS — J34.829 NASAL VALVE COLLAPSE: Primary | ICD-10-CM

## 2019-10-31 DIAGNOSIS — J34.89 NASAL OBSTRUCTION: ICD-10-CM

## 2019-10-31 NOTE — TELEPHONE ENCOUNTER
Patient is scheduled for surgery with Dr.Lyford Coles  Spoke or left message with: Spoke to Cesia  Date of Surgery: 11/26/2019  Location: [facility] OR   H&P: Scheduled with PCP   Additional imaging/appointments: Post op appointment needed, 1 week   Surgery packet: Will get one mailed out  Prior Authorization: approved until 12/31/2019 per Rosalinda    Additional comments: was told currently patient is scheduled in the afternoon, but that can changed. Verbalized understanding of arriavl and instructions a couple days prior to surgery.         Sharon Patiño  Perioperative Coordinator, ENT  [phone]

## 2019-11-15 ENCOUNTER — OFFICE VISIT (OUTPATIENT)
Dept: INTERNAL MEDICINE | Facility: CLINIC | Age: 49
End: 2019-11-15
Payer: COMMERCIAL

## 2019-11-15 VITALS
RESPIRATION RATE: 16 BRPM | TEMPERATURE: 97.7 F | HEART RATE: 82 BPM | DIASTOLIC BLOOD PRESSURE: 80 MMHG | BODY MASS INDEX: 27.02 KG/M2 | WEIGHT: 216.2 LBS | SYSTOLIC BLOOD PRESSURE: 120 MMHG | OXYGEN SATURATION: 98 %

## 2019-11-15 DIAGNOSIS — J34.2 NASAL SEPTAL DEVIATION: ICD-10-CM

## 2019-11-15 DIAGNOSIS — I10 BENIGN ESSENTIAL HYPERTENSION: ICD-10-CM

## 2019-11-15 DIAGNOSIS — J34.829 NASAL VALVE COLLAPSE: ICD-10-CM

## 2019-11-15 DIAGNOSIS — Z01.818 PREOP GENERAL PHYSICAL EXAM: Primary | ICD-10-CM

## 2019-11-15 PROCEDURE — 99214 OFFICE O/P EST MOD 30 MIN: CPT | Performed by: INTERNAL MEDICINE

## 2019-11-15 RX ORDER — AMLODIPINE BESYLATE 5 MG/1
5 TABLET ORAL DAILY
Qty: 90 TABLET | Refills: 3 | Status: SHIPPED | OUTPATIENT
Start: 2019-11-15 | End: 2020-12-22

## 2019-11-15 RX ORDER — LISINOPRIL 20 MG/1
20 TABLET ORAL DAILY
Qty: 90 TABLET | Refills: 3 | Status: SHIPPED | OUTPATIENT
Start: 2019-11-15 | End: 2020-12-22

## 2019-11-15 NOTE — PROGRESS NOTES
[address] [id number] [phone]  Dept: [phone]    PRE-OP EVALUATION:  Today's date: 11/15/2019    Lev Olvera (: 1970) presents for pre-operative evaluation assessment as requested by Dr. Driscoll.  He requires evaluation and anesthesia risk assessment prior to undergoing surgery/procedure for treatment of nasal valve collapse, septal deviation.    Fax number for surgical facility: [facility]  Primary Physician: Deejay De Los Santos  Type of Anesthesia Anticipated: General    Patient has a Health Care Directive or Living Will:  YES Both    Preop Questions 11/15/2019   Who is doing your surgery? Dr. Driscoll   What are you having done?   Septorhinoplasty, with repair of nasal valve collapse   Date of Surgery/Procedure: 2019   Facility or Hospital where procedure/surgery will be performed: [facility]   1.  Do you have a history of Heart attack, stroke, stent, coronary bypass surgery, or other heart surgery? No   2.  Do you ever have any pain or discomfort in your chest? No   3.  Do you have a history of  Heart Failure? No   4.   Are you troubled by shortness of breath when:  walking on a level surface, or up a slight hill, or at night? No   5.  Do you currently have a cold, bronchitis or other respiratory infection? No   6.  Do you have a cough, shortness of breath, or wheezing? No   7.  Do you sometimes get pains in the calves of your legs when you walk? No   8. Do you or anyone in your family have previous history of blood clots? YES -    9.  Do you or does anyone in your family have a serious bleeding problem such as prolonged bleeding following surgeries or cuts? No   10. Have you ever had problems with anemia or been told to take iron pills? No   11. Have you had any abnormal blood loss such as black, tarry or bloody stools? No   12. Have you ever had a blood transfusion? No   13. Have you or any of your relatives ever  had problems with anesthesia? No   14. Do you have sleep apnea, excessive snoring or daytime drowsiness? YES -    15. Do you have any prosthetic heart valves? No   16. Do you have prosthetic joints? No         HPI:     See problem list for active medical problems.  Problems all longstanding and stable, except as noted/documented.  See ROS for pertinent symptoms related to these conditions.    HYPERTENSION - Patient has longstanding history of HTN , currently denies any symptoms referable to elevated blood pressure. Specifically denies chest pain, palpitations, dyspnea, orthopnea, PND or peripheral edema. Blood pressure readings have been in normal range. Current medication regimen is as listed below. Patient denies any side effects of medication.       MEDICAL HISTORY:     Patient Active Problem List    Diagnosis Date Noted     Nasal obstruction 09/11/2019     Priority: Medium     Nasal valve collapse 09/11/2019     Priority: Medium     Deviated nasal septum 09/11/2019     Priority: Medium     KATHERINE (obstructive sleep apnea) 06/25/2019     Priority: Medium       6/23/2019 [facility] Sleep Study (214.0 lbs) - AHI 44.7, RDI 49.1, Supine AHI 44.7, REM AHI 74.3, Low O2% 67.1%, Time Spent ?88% 10.6, Time Spent ?89% 12.4. Treatment was titrated to a pressure of CPAP 14 with an AHI 1.1. Time spent in REM supine at this pressure was 5.0 minutes.       Benign essential hypertension 01/17/2019     Priority: Medium      History reviewed. No pertinent past medical history.  Past Surgical History:   Procedure Laterality Date     SURGICAL HISTORY OF -       Tooth extraction     Current Outpatient Medications   Medication Sig Dispense Refill     amLODIPine (NORVASC) 5 MG tablet Take 1 tablet (5 mg) by mouth daily 90 tablet 3     lisinopril (PRINIVIL/ZESTRIL) 20 MG tablet Take 1 tablet (20 mg) by mouth daily 90 tablet 3     OTC products: none    Allergies   Allergen Reactions     Penicillins Hives     Trazodone Hcl      dizzy       Latex Allergy: NO    Social History     Tobacco Use     Smoking status: Never Smoker     Smokeless tobacco: Never Used   Substance Use Topics     Alcohol use: Yes     Alcohol/week: 0.0 standard drinks     Comment: occasional     History   Drug Use Not on file       REVIEW OF SYSTEMS:   Constitutional, neuro, ENT, endocrine, pulmonary, cardiac, gastrointestinal, genitourinary, musculoskeletal, integument and psychiatric systems are negative, except as otherwise noted.    EXAM:   /80 (BP Location: Left arm, Patient Position: Chair, Cuff Size: Adult Large)   Pulse 82   Temp 97.7  F (36.5  C) (Oral)   Resp 16   Wt 98.1 kg (216 lb 3.2 oz)   SpO2 98%   BMI 27.02 kg/m    GENERAL APPEARANCE: healthy, alert and no distress  HENT: ear canals and TM's normal and nose and mouth without ulcers or lesions  RESP: lungs clear to auscultation - no rales, rhonchi or wheezes  CV: regular rate and rhythm, normal S1 S2, no S3 or S4 and no murmur, click or rub   ABDOMEN: soft, nontender, no HSM or masses and bowel sounds normal  NEURO: Normal strength and tone, sensory exam grossly normal, mentation intact and speech normal    DIAGNOSTICS:   EKG: Not indicated due to non-vascular surgery and low risk of event (age <65 and without cardiac risk factors)    Recent Labs   Lab Test 03/13/19  1339 01/11/19  1938   HGB  --  15.2   PLT  --  236    139   POTASSIUM 4.4 3.7   CR 1.16 1.19        IMPRESSION:   Reason for surgery/procedure: Nasal septal deviation, nasal valve collapse  Diagnosis/reason for consult: Hypertension    The proposed surgical procedure is considered LOW risk.    REVISED CARDIAC RISK INDEX  The patient has the following serious cardiovascular risks for perioperative complications such as (MI, PE, VFib and 3  AV Block):  No serious cardiac risks  INTERPRETATION: 0 risks: Class I (very low risk - 0.4% complication rate)    The patient has the following additional risks for perioperative complications:  No  identified additional risks      ICD-10-CM    1. Preop general physical exam Z01.818    2. Nasal septal deviation J34.2    3. Nasal valve collapse J34.89    4. Benign essential hypertension I10 lisinopril (PRINIVIL/ZESTRIL) 20 MG tablet     amLODIPine (NORVASC) 5 MG tablet       RECOMMENDATIONS:     --Patient is to take all scheduled medications on the day of surgery    APPROVAL GIVEN to proceed with proposed procedure, without further diagnostic evaluation       Signed Electronically by: Deejay De Los Santos MD    Copy of this evaluation report is provided to requesting physician.    [facility] Preop Guidelines    Revised Cardiac Risk Index

## 2019-11-24 ENCOUNTER — NURSE TRIAGE (OUTPATIENT)
Dept: NURSING | Facility: CLINIC | Age: 49
End: 2019-11-24

## 2019-11-24 ENCOUNTER — TELEPHONE (OUTPATIENT)
Dept: OTOLARYNGOLOGY | Facility: CLINIC | Age: 49
End: 2019-11-24

## 2019-11-24 NOTE — TELEPHONE ENCOUNTER
Clinic Action Needed:Yes  Reason for Call:See My Chart Message from 11/24/19 1019 a.m.. Patient is requesting information on upcoming surgery scheduled for 11/26/19. Patient has not received packet.    Kalpana Obrien RN  [facility]

## 2019-11-24 NOTE — TELEPHONE ENCOUNTER
Clinic Action Needed:Yes  Reason for Call:See My Chart Message from 11/24/19 1019 a.m.. Patient is requesting information on upcoming surgery scheduled for 11/26/19. Patient has not received packet.    Kalpana Obrien RN  [facility]

## 2019-11-25 ENCOUNTER — ANESTHESIA EVENT (OUTPATIENT)
Dept: SURGERY | Facility: CLINIC | Age: 49
End: 2019-11-25
Payer: COMMERCIAL

## 2019-11-25 NOTE — ANESTHESIA PREPROCEDURE EVALUATION
Anesthesia Pre-Procedure Evaluation    Patient: Lev Olvera   MRN:     [id number] Gender:   male   Age:    49 year old :      1970        Preoperative Diagnosis: Nasal valve collapse [J34.89]  Nasal obstruction [J34.89]   Procedure(s):  Septorhinoplasty with repair of nasal valve collaspe, bilateral turbinate reduction,  and possible ear conchal graft     Past Medical History:   Diagnosis Date     Sleep apnea       Past Surgical History:   Procedure Laterality Date     SURGICAL HISTORY OF -       Tooth extraction          Anesthesia Evaluation     . Pt has had prior anesthetic.     No history of anesthetic complications          ROS/MED HX    ENT/Pulmonary:     (+)sleep apnea, , . .    Neurologic:  - neg neurologic ROS     Cardiovascular:     (+) hypertension----. : . . . :. . Previous cardiac testing Echodate:19results:Normal biventricular size and function. Left ventricular ejection fraction of  60-65%.  Normal sized atria.  No hemodynamically significant valvular disease.  Normal pulmonary pressures.date: results:ECG reviewed date:19 results:Sinus rhythm 77  Possible Left atrial enlargement  Left ventricular hypertrophy   date: results:          METS/Exercise Tolerance:  >4 METS   Hematologic:  - neg hematologic  ROS       Musculoskeletal:  - neg musculoskeletal ROS       GI/Hepatic:  - neg GI/hepatic ROS       Renal/Genitourinary:  - ROS Renal section negative       Endo:  - neg endo ROS       Psychiatric:  - neg psychiatric ROS       Infectious Disease:  - neg infectious disease ROS       Malignancy:      - no malignancy   Other:                         PHYSICAL EXAM:   Mental Status/Neuro: A/A/O   Airway: Facies: Feasible  Mallampati: I  Mouth/Opening: Full  TM distance: > 6 cm  Neck ROM: Full   Respiratory: Auscultation: CTAB     Resp. Rate: Normal     Resp. Effort: Normal      CV: Rhythm: Regular  Rate: Age appropriate  Heart: Normal Sounds  Edema: None   Comments:      Dental:  "Details                  LABS:  CBC:   Lab Results   Component Value Date    WBC 7.6 01/11/2019    WBC 9.4 12/22/2004    HGB 15.2 01/11/2019    HGB 16.5 12/22/2004    HCT 43.0 01/11/2019    HCT 50.4 12/22/2004     01/11/2019     12/22/2004     BMP:   Lab Results   Component Value Date     03/13/2019     01/11/2019    POTASSIUM 4.4 03/13/2019    POTASSIUM 3.7 01/11/2019    CHLORIDE 105 03/13/2019    CHLORIDE 103 01/11/2019    CO2 27 03/13/2019    CO2 29 01/11/2019    BUN 23 03/13/2019    BUN 19 01/11/2019    CR 1.16 03/13/2019    CR 1.19 01/11/2019    GLC 96 03/13/2019     (H) 01/11/2019     COAGS: No results found for: PTT, INR, FIBR  POC: No results found for: BGM, HCG, HCGS  OTHER:   Lab Results   Component Value Date    JEANETH 9.4 03/13/2019    ALBUMIN 4.5 03/13/2019    PROTTOTAL 8.2 03/13/2019    ALT 36 03/13/2019    AST 28 03/13/2019    ALKPHOS 87 03/13/2019    BILITOTAL 0.6 03/13/2019    TSH 6.72 (H) 03/13/2019    T4 0.82 03/13/2019        Preop Vitals    BP Readings from Last 3 Encounters:   11/15/19 120/80   09/11/19 (!) 160/100   09/03/19 115/77    Pulse Readings from Last 3 Encounters:   11/15/19 82   09/11/19 66   09/03/19 86      Resp Readings from Last 3 Encounters:   11/15/19 16   09/03/19 16   03/13/19 16    SpO2 Readings from Last 3 Encounters:   11/15/19 98%   09/03/19 98%   03/13/19 98%      Temp Readings from Last 1 Encounters:   11/15/19 36.5  C (97.7  F) (Oral)    Ht Readings from Last 1 Encounters:   09/03/19 1.905 m (6' 3\")      Wt Readings from Last 1 Encounters:   11/15/19 98.1 kg (216 lb 3.2 oz)    Estimated body mass index is 27.02 kg/m  as calculated from the following:    Height as of 9/3/19: 1.905 m (6' 3\").    Weight as of 11/15/19: 98.1 kg (216 lb 3.2 oz).     LDA:  Peripheral IV 01/11/19 Right Upper arm (Active)   Number of days: 318        Assessment:   ASA SCORE: 2    H&P: History and physical reviewed and following examination; no interval change. "   Smoking Status:  Non-Smoker/Unknown   NPO Status: NPO Appropriate     Plan:   Anes. Type:  General   Pre-Medication: Acetaminophen; Gabapentin   Induction:  IV (Standard)   Airway: ETT; Oral   Access/Monitoring: PIV   Maintenance: Balanced     Drips/Meds: Dexmedetomidine; Ketamine     Advanced Monitoring: BIS     Postop Plan:   Postop Pain: Opioids  Postop Sedation/Airway: Not planned  Disposition: Outpatient     PONV Management:   Adult Risk Factors:, Non-Smoker, Postop Opioids   Prevention: Ondansetron, Dexamethasone     CONSENT: Direct conversation   Plan and risks discussed with: Patient   Blood Products: Consent Deferred (Minimal Blood Loss)                   Malathi Dalal MD

## 2019-11-26 ENCOUNTER — ANESTHESIA (OUTPATIENT)
Dept: SURGERY | Facility: CLINIC | Age: 49
End: 2019-11-26
Payer: COMMERCIAL

## 2019-11-26 ENCOUNTER — HOSPITAL ENCOUNTER (OUTPATIENT)
Facility: CLINIC | Age: 49
Setting detail: OBSERVATION
Discharge: HOME | End: 2019-11-27
Attending: OTOLARYNGOLOGY | Admitting: OTOLARYNGOLOGY
Payer: COMMERCIAL

## 2019-11-26 DIAGNOSIS — J34.829 NASAL VALVE COLLAPSE: ICD-10-CM

## 2019-11-26 DIAGNOSIS — J34.89 NASAL OBSTRUCTION: ICD-10-CM

## 2019-11-26 DIAGNOSIS — Z98.890 STATUS POST RHINOPLASTY: Primary | ICD-10-CM

## 2019-11-26 LAB — GLUCOSE BLDC GLUCOMTR-MCNC: 91 MG/DL (ref 70–99)

## 2019-11-26 PROCEDURE — 96374 THER/PROPH/DIAG INJ IV PUSH: CPT | Mod: 59

## 2019-11-26 PROCEDURE — 25000132 ZZH RX MED GY IP 250 OP 250 PS 637: Performed by: ANESTHESIOLOGY

## 2019-11-26 PROCEDURE — 96376 TX/PRO/DX INJ SAME DRUG ADON: CPT | Mod: 59

## 2019-11-26 PROCEDURE — 36000057 ZZH SURGERY LEVEL 3 1ST 30 MIN - UMMC: Performed by: OTOLARYNGOLOGY

## 2019-11-26 PROCEDURE — 25000128 H RX IP 250 OP 636: Performed by: ANESTHESIOLOGY

## 2019-11-26 PROCEDURE — 25000132 ZZH RX MED GY IP 250 OP 250 PS 637: Performed by: STUDENT IN AN ORGANIZED HEALTH CARE EDUCATION/TRAINING PROGRAM

## 2019-11-26 PROCEDURE — 25000128 H RX IP 250 OP 636: Performed by: NURSE ANESTHETIST, CERTIFIED REGISTERED

## 2019-11-26 PROCEDURE — 40000170 ZZH STATISTIC PRE-PROCEDURE ASSESSMENT II: Performed by: OTOLARYNGOLOGY

## 2019-11-26 PROCEDURE — G0378 HOSPITAL OBSERVATION PER HR: HCPCS

## 2019-11-26 PROCEDURE — 00000146 ZZHCL STATISTIC GLUCOSE BY METER IP

## 2019-11-26 PROCEDURE — 27210794 ZZH OR GENERAL SUPPLY STERILE: Performed by: OTOLARYNGOLOGY

## 2019-11-26 PROCEDURE — 71000014 ZZH RECOVERY PHASE 1 LEVEL 2 FIRST HR: Performed by: OTOLARYNGOLOGY

## 2019-11-26 PROCEDURE — 25000125 ZZHC RX 250: Performed by: NURSE ANESTHETIST, CERTIFIED REGISTERED

## 2019-11-26 PROCEDURE — 36000059 ZZH SURGERY LEVEL 3 EA 15 ADDTL MIN UMMC: Performed by: OTOLARYNGOLOGY

## 2019-11-26 PROCEDURE — 37000009 ZZH ANESTHESIA TECHNICAL FEE, EACH ADDTL 15 MIN: Performed by: OTOLARYNGOLOGY

## 2019-11-26 PROCEDURE — 25000565 ZZH ISOFLURANE, EA 15 MIN: Performed by: OTOLARYNGOLOGY

## 2019-11-26 PROCEDURE — 71000015 ZZH RECOVERY PHASE 1 LEVEL 2 EA ADDTL HR: Performed by: OTOLARYNGOLOGY

## 2019-11-26 PROCEDURE — 25800030 ZZH RX IP 258 OP 636: Performed by: NURSE ANESTHETIST, CERTIFIED REGISTERED

## 2019-11-26 PROCEDURE — 25000125 ZZHC RX 250: Performed by: OTOLARYNGOLOGY

## 2019-11-26 PROCEDURE — 37000008 ZZH ANESTHESIA TECHNICAL FEE, 1ST 30 MIN: Performed by: OTOLARYNGOLOGY

## 2019-11-26 RX ORDER — AMLODIPINE BESYLATE 5 MG/1
5 TABLET ORAL DAILY
Status: DISCONTINUED | OUTPATIENT
Start: 2019-11-26 | End: 2019-11-27 | Stop reason: HOSPADM

## 2019-11-26 RX ORDER — SODIUM CHLORIDE, SODIUM LACTATE, POTASSIUM CHLORIDE, CALCIUM CHLORIDE 600; 310; 30; 20 MG/100ML; MG/100ML; MG/100ML; MG/100ML
INJECTION, SOLUTION INTRAVENOUS CONTINUOUS PRN
Status: DISCONTINUED | OUTPATIENT
Start: 2019-11-26 | End: 2019-11-26

## 2019-11-26 RX ORDER — HYDROMORPHONE HYDROCHLORIDE 1 MG/ML
0.2 INJECTION, SOLUTION INTRAMUSCULAR; INTRAVENOUS; SUBCUTANEOUS
Status: DISCONTINUED | OUTPATIENT
Start: 2019-11-26 | End: 2019-11-27 | Stop reason: HOSPADM

## 2019-11-26 RX ORDER — SODIUM CHLORIDE, SODIUM LACTATE, POTASSIUM CHLORIDE, CALCIUM CHLORIDE 600; 310; 30; 20 MG/100ML; MG/100ML; MG/100ML; MG/100ML
INJECTION, SOLUTION INTRAVENOUS CONTINUOUS
Status: DISCONTINUED | OUTPATIENT
Start: 2019-11-26 | End: 2019-11-26 | Stop reason: HOSPADM

## 2019-11-26 RX ORDER — KETAMINE HYDROCHLORIDE 10 MG/ML
INJECTION, SOLUTION INTRAMUSCULAR; INTRAVENOUS PRN
Status: DISCONTINUED | OUTPATIENT
Start: 2019-11-26 | End: 2019-11-26

## 2019-11-26 RX ORDER — NALOXONE HYDROCHLORIDE 0.4 MG/ML
.1-.4 INJECTION, SOLUTION INTRAMUSCULAR; INTRAVENOUS; SUBCUTANEOUS
Status: DISCONTINUED | OUTPATIENT
Start: 2019-11-26 | End: 2019-11-27 | Stop reason: HOSPADM

## 2019-11-26 RX ORDER — ONDANSETRON 4 MG/1
4 TABLET, ORALLY DISINTEGRATING ORAL EVERY 6 HOURS PRN
Status: DISCONTINUED | OUTPATIENT
Start: 2019-11-26 | End: 2019-11-27 | Stop reason: HOSPADM

## 2019-11-26 RX ORDER — FENTANYL CITRATE 50 UG/ML
25-50 INJECTION, SOLUTION INTRAMUSCULAR; INTRAVENOUS
Status: DISCONTINUED | OUTPATIENT
Start: 2019-11-26 | End: 2019-11-26 | Stop reason: HOSPADM

## 2019-11-26 RX ORDER — ONDANSETRON 2 MG/ML
INJECTION INTRAMUSCULAR; INTRAVENOUS PRN
Status: DISCONTINUED | OUTPATIENT
Start: 2019-11-26 | End: 2019-11-26

## 2019-11-26 RX ORDER — ONDANSETRON 2 MG/ML
4 INJECTION INTRAMUSCULAR; INTRAVENOUS EVERY 30 MIN PRN
Status: DISCONTINUED | OUTPATIENT
Start: 2019-11-26 | End: 2019-11-26 | Stop reason: HOSPADM

## 2019-11-26 RX ORDER — CLINDAMYCIN PHOSPHATE 900 MG/50ML
900 INJECTION, SOLUTION INTRAVENOUS SEE ADMIN INSTRUCTIONS
Status: DISCONTINUED | OUTPATIENT
Start: 2019-11-26 | End: 2019-11-26 | Stop reason: HOSPADM

## 2019-11-26 RX ORDER — ACETAMINOPHEN 325 MG/1
650 TABLET ORAL EVERY 4 HOURS PRN
Status: DISCONTINUED | OUTPATIENT
Start: 2019-11-26 | End: 2019-11-27 | Stop reason: HOSPADM

## 2019-11-26 RX ORDER — LIDOCAINE HYDROCHLORIDE 20 MG/ML
INJECTION, SOLUTION INFILTRATION; PERINEURAL PRN
Status: DISCONTINUED | OUTPATIENT
Start: 2019-11-26 | End: 2019-11-26

## 2019-11-26 RX ORDER — ALBUTEROL SULFATE 0.83 MG/ML
2.5 SOLUTION RESPIRATORY (INHALATION) EVERY 4 HOURS PRN
Status: DISCONTINUED | OUTPATIENT
Start: 2019-11-26 | End: 2019-11-26 | Stop reason: HOSPADM

## 2019-11-26 RX ORDER — OXYCODONE HYDROCHLORIDE 5 MG/1
5 TABLET ORAL EVERY 6 HOURS PRN
Qty: 15 TABLET | Refills: 0 | Status: SHIPPED | OUTPATIENT
Start: 2019-11-26 | End: 2020-02-28

## 2019-11-26 RX ORDER — OXYCODONE HYDROCHLORIDE 5 MG/1
5-10 TABLET ORAL
Status: DISCONTINUED | OUTPATIENT
Start: 2019-11-26 | End: 2019-11-27 | Stop reason: HOSPADM

## 2019-11-26 RX ORDER — AMOXICILLIN 250 MG
1 CAPSULE ORAL 2 TIMES DAILY
Status: DISCONTINUED | OUTPATIENT
Start: 2019-11-26 | End: 2019-11-27 | Stop reason: HOSPADM

## 2019-11-26 RX ORDER — DEXAMETHASONE SODIUM PHOSPHATE 4 MG/ML
INJECTION, SOLUTION INTRA-ARTICULAR; INTRALESIONAL; INTRAMUSCULAR; INTRAVENOUS; SOFT TISSUE PRN
Status: DISCONTINUED | OUTPATIENT
Start: 2019-11-26 | End: 2019-11-26

## 2019-11-26 RX ORDER — ONDANSETRON 4 MG/1
4 TABLET, ORALLY DISINTEGRATING ORAL EVERY 30 MIN PRN
Status: DISCONTINUED | OUTPATIENT
Start: 2019-11-26 | End: 2019-11-26 | Stop reason: HOSPADM

## 2019-11-26 RX ORDER — ACETAMINOPHEN 650 MG/1
650 SUPPOSITORY RECTAL EVERY 4 HOURS PRN
Status: DISCONTINUED | OUTPATIENT
Start: 2019-11-26 | End: 2019-11-27 | Stop reason: HOSPADM

## 2019-11-26 RX ORDER — HYDROMORPHONE HYDROCHLORIDE 1 MG/ML
.3-.5 INJECTION, SOLUTION INTRAMUSCULAR; INTRAVENOUS; SUBCUTANEOUS EVERY 10 MIN PRN
Status: DISCONTINUED | OUTPATIENT
Start: 2019-11-26 | End: 2019-11-26 | Stop reason: HOSPADM

## 2019-11-26 RX ORDER — FENTANYL CITRATE 50 UG/ML
INJECTION, SOLUTION INTRAMUSCULAR; INTRAVENOUS PRN
Status: DISCONTINUED | OUTPATIENT
Start: 2019-11-26 | End: 2019-11-26

## 2019-11-26 RX ORDER — LABETALOL 20 MG/4 ML (5 MG/ML) INTRAVENOUS SYRINGE
PRN
Status: DISCONTINUED | OUTPATIENT
Start: 2019-11-26 | End: 2019-11-26

## 2019-11-26 RX ORDER — DIMENHYDRINATE 50 MG/ML
25 INJECTION, SOLUTION INTRAMUSCULAR; INTRAVENOUS
Status: DISCONTINUED | OUTPATIENT
Start: 2019-11-26 | End: 2019-11-26 | Stop reason: HOSPADM

## 2019-11-26 RX ORDER — METOPROLOL TARTRATE 1 MG/ML
1-2 INJECTION, SOLUTION INTRAVENOUS EVERY 5 MIN PRN
Status: DISCONTINUED | OUTPATIENT
Start: 2019-11-26 | End: 2019-11-26 | Stop reason: HOSPADM

## 2019-11-26 RX ORDER — ONDANSETRON 2 MG/ML
4 INJECTION INTRAMUSCULAR; INTRAVENOUS EVERY 6 HOURS PRN
Status: DISCONTINUED | OUTPATIENT
Start: 2019-11-26 | End: 2019-11-27 | Stop reason: HOSPADM

## 2019-11-26 RX ORDER — LISINOPRIL 20 MG/1
20 TABLET ORAL DAILY
Status: DISCONTINUED | OUTPATIENT
Start: 2019-11-26 | End: 2019-11-27 | Stop reason: HOSPADM

## 2019-11-26 RX ORDER — PROPOFOL 10 MG/ML
INJECTION, EMULSION INTRAVENOUS PRN
Status: DISCONTINUED | OUTPATIENT
Start: 2019-11-26 | End: 2019-11-26

## 2019-11-26 RX ORDER — AMOXICILLIN 250 MG
2 CAPSULE ORAL 2 TIMES DAILY
Status: DISCONTINUED | OUTPATIENT
Start: 2019-11-26 | End: 2019-11-27 | Stop reason: HOSPADM

## 2019-11-26 RX ORDER — CLINDAMYCIN PHOSPHATE 900 MG/50ML
900 INJECTION, SOLUTION INTRAVENOUS
Status: COMPLETED | OUTPATIENT
Start: 2019-11-26 | End: 2019-11-26

## 2019-11-26 RX ORDER — ACETAMINOPHEN 325 MG/1
975 TABLET ORAL
Status: COMPLETED | OUTPATIENT
Start: 2019-11-26 | End: 2019-11-26

## 2019-11-26 RX ORDER — NALOXONE HYDROCHLORIDE 0.4 MG/ML
.1-.4 INJECTION, SOLUTION INTRAMUSCULAR; INTRAVENOUS; SUBCUTANEOUS
Status: DISCONTINUED | OUTPATIENT
Start: 2019-11-26 | End: 2019-11-26 | Stop reason: HOSPADM

## 2019-11-26 RX ORDER — LIDOCAINE HYDROCHLORIDE AND EPINEPHRINE 10; 10 MG/ML; UG/ML
INJECTION, SOLUTION INFILTRATION; PERINEURAL PRN
Status: DISCONTINUED | OUTPATIENT
Start: 2019-11-26 | End: 2019-11-26 | Stop reason: HOSPADM

## 2019-11-26 RX ORDER — HYDRALAZINE HYDROCHLORIDE 20 MG/ML
2.5-5 INJECTION INTRAMUSCULAR; INTRAVENOUS EVERY 10 MIN PRN
Status: DISCONTINUED | OUTPATIENT
Start: 2019-11-26 | End: 2019-11-26 | Stop reason: HOSPADM

## 2019-11-26 RX ORDER — MEPERIDINE HYDROCHLORIDE 25 MG/ML
12.5 INJECTION INTRAMUSCULAR; INTRAVENOUS; SUBCUTANEOUS
Status: DISCONTINUED | OUTPATIENT
Start: 2019-11-26 | End: 2019-11-26 | Stop reason: HOSPADM

## 2019-11-26 RX ADMIN — DEXAMETHASONE SODIUM PHOSPHATE 10 MG: 4 INJECTION, SOLUTION INTRA-ARTICULAR; INTRALESIONAL; INTRAMUSCULAR; INTRAVENOUS; SOFT TISSUE at 13:15

## 2019-11-26 RX ADMIN — GABAPENTIN 400 MG: 300 CAPSULE ORAL at 11:28

## 2019-11-26 RX ADMIN — PHENYLEPHRINE HYDROCHLORIDE 100 MCG: 10 INJECTION INTRAVENOUS at 14:08

## 2019-11-26 RX ADMIN — LABETALOL 20 MG/4 ML (5 MG/ML) INTRAVENOUS SYRINGE 10 MG: at 15:28

## 2019-11-26 RX ADMIN — FENTANYL CITRATE 25 MCG: 50 INJECTION, SOLUTION INTRAMUSCULAR; INTRAVENOUS at 16:11

## 2019-11-26 RX ADMIN — FENTANYL CITRATE 25 MCG: 50 INJECTION, SOLUTION INTRAMUSCULAR; INTRAVENOUS at 16:42

## 2019-11-26 RX ADMIN — FENTANYL CITRATE 50 MCG: 50 INJECTION, SOLUTION INTRAMUSCULAR; INTRAVENOUS at 12:39

## 2019-11-26 RX ADMIN — FENTANYL CITRATE 50 MCG: 50 INJECTION, SOLUTION INTRAMUSCULAR; INTRAVENOUS at 13:00

## 2019-11-26 RX ADMIN — KETAMINE HYDROCHLORIDE 10 MG: 10 INJECTION, SOLUTION INTRAMUSCULAR; INTRAVENOUS at 15:19

## 2019-11-26 RX ADMIN — ONDANSETRON 4 MG: 2 INJECTION INTRAMUSCULAR; INTRAVENOUS at 15:17

## 2019-11-26 RX ADMIN — LISINOPRIL 20 MG: 20 TABLET ORAL at 19:10

## 2019-11-26 RX ADMIN — ACETAMINOPHEN 650 MG: 325 TABLET, FILM COATED ORAL at 19:15

## 2019-11-26 RX ADMIN — FENTANYL CITRATE 25 MCG: 50 INJECTION, SOLUTION INTRAMUSCULAR; INTRAVENOUS at 16:36

## 2019-11-26 RX ADMIN — ROCURONIUM BROMIDE 10 MG: 10 INJECTION INTRAVENOUS at 13:59

## 2019-11-26 RX ADMIN — LIDOCAINE HYDROCHLORIDE 100 MG: 20 INJECTION, SOLUTION INFILTRATION; PERINEURAL at 12:45

## 2019-11-26 RX ADMIN — PHENYLEPHRINE HYDROCHLORIDE 50 MCG: 10 INJECTION INTRAVENOUS at 13:19

## 2019-11-26 RX ADMIN — LABETALOL 20 MG/4 ML (5 MG/ML) INTRAVENOUS SYRINGE 5 MG: at 15:37

## 2019-11-26 RX ADMIN — PHENYLEPHRINE HYDROCHLORIDE 100 MCG: 10 INJECTION INTRAVENOUS at 13:44

## 2019-11-26 RX ADMIN — ROCURONIUM BROMIDE 70 MG: 10 INJECTION INTRAVENOUS at 12:46

## 2019-11-26 RX ADMIN — ROCURONIUM BROMIDE 20 MG: 10 INJECTION INTRAVENOUS at 14:30

## 2019-11-26 RX ADMIN — ROCURONIUM BROMIDE 20 MG: 10 INJECTION INTRAVENOUS at 14:53

## 2019-11-26 RX ADMIN — PROPOFOL 170 MG: 10 INJECTION, EMULSION INTRAVENOUS at 12:45

## 2019-11-26 RX ADMIN — MIDAZOLAM 2 MG: 1 INJECTION INTRAMUSCULAR; INTRAVENOUS at 12:37

## 2019-11-26 RX ADMIN — ACETAMINOPHEN 975 MG: 325 TABLET, FILM COATED ORAL at 11:28

## 2019-11-26 RX ADMIN — AMLODIPINE BESYLATE 5 MG: 5 TABLET ORAL at 19:09

## 2019-11-26 RX ADMIN — SALINE NASAL SPRAY 2 SPRAY: 1.5 SOLUTION NASAL at 21:20

## 2019-11-26 RX ADMIN — KETAMINE HYDROCHLORIDE 20 MG: 10 INJECTION, SOLUTION INTRAMUSCULAR; INTRAVENOUS at 14:26

## 2019-11-26 RX ADMIN — SODIUM CHLORIDE, POTASSIUM CHLORIDE, SODIUM LACTATE AND CALCIUM CHLORIDE: 600; 310; 30; 20 INJECTION, SOLUTION INTRAVENOUS at 15:00

## 2019-11-26 RX ADMIN — PHENYLEPHRINE HYDROCHLORIDE 50 MCG: 10 INJECTION INTRAVENOUS at 13:11

## 2019-11-26 RX ADMIN — SUGAMMADEX 200 MG: 100 INJECTION, SOLUTION INTRAVENOUS at 15:30

## 2019-11-26 RX ADMIN — PHENYLEPHRINE HYDROCHLORIDE 100 MCG: 10 INJECTION INTRAVENOUS at 13:34

## 2019-11-26 RX ADMIN — CLINDAMYCIN PHOSPHATE 900 MG: 900 INJECTION, SOLUTION INTRAVENOUS at 12:51

## 2019-11-26 RX ADMIN — FENTANYL CITRATE 25 MCG: 50 INJECTION, SOLUTION INTRAMUSCULAR; INTRAVENOUS at 13:59

## 2019-11-26 RX ADMIN — FENTANYL CITRATE 25 MCG: 50 INJECTION, SOLUTION INTRAMUSCULAR; INTRAVENOUS at 15:12

## 2019-11-26 RX ADMIN — FENTANYL CITRATE 50 MCG: 50 INJECTION, SOLUTION INTRAMUSCULAR; INTRAVENOUS at 15:43

## 2019-11-26 RX ADMIN — PHENYLEPHRINE HYDROCHLORIDE 100 MCG: 10 INJECTION INTRAVENOUS at 14:47

## 2019-11-26 RX ADMIN — PHENYLEPHRINE HYDROCHLORIDE 100 MCG: 10 INJECTION INTRAVENOUS at 13:30

## 2019-11-26 RX ADMIN — ROCURONIUM BROMIDE 20 MG: 10 INJECTION INTRAVENOUS at 13:42

## 2019-11-26 RX ADMIN — FENTANYL CITRATE 100 MCG: 50 INJECTION, SOLUTION INTRAMUSCULAR; INTRAVENOUS at 12:45

## 2019-11-26 RX ADMIN — SODIUM CHLORIDE, POTASSIUM CHLORIDE, SODIUM LACTATE AND CALCIUM CHLORIDE: 600; 310; 30; 20 INJECTION, SOLUTION INTRAVENOUS at 12:36

## 2019-11-26 ASSESSMENT — MIFFLIN-ST. JEOR
SCORE: 1923.63
SCORE: 1923.63

## 2019-11-26 NOTE — BRIEF OP NOTE
[facility], [place]    Brief Operative Note    Pre-operative diagnosis: Nasal valve collapse [J34.89]  Nasal obstruction [J34.89]  Post-operative diagnosis Same as pre-operative diagnosis    Procedure: Procedure(s):  Septorhinoplasty with repair of nasal valve collaspe, bilateral turbinate reduction,  Surgeon: Surgeon(s) and Role:     * Alberta Driscoll MD - Primary     * Uzair Hawthorne MD - Resident - Assisting     * Will Segovia MD - Resident - Assisting  Anesthesia: General   Estimated blood loss: Less than 50 ml  Drains: None  Specimens: * No specimens in log *  Findings:   bilateral spreaders; caudal septal extension graft; bilateral medial/lateral osteotomies; bilateral turbinate reduction .  Complications: None.  Implants: * No implants in log *

## 2019-11-26 NOTE — OP NOTE
SURGEON:  Alberta Driscoll MD   ASSISTANT SURGEON:   - Will Segovia MD  - Nicolle Del Cid MD      TITLE OF OPERATION:                       Septorhinoplasty    Bilateral inferior turbinate reduction and outfracture.    Nasal valve repair, bilateral.      INDICATIONS:      Lev Olvera is a 49 year old  with a significant history of nasal obstruction that has led to significant dysfunction and symptoms. Physical examination in clinic demonstrated notable structural deformities requiring a surgical correction for improvement of function. These would not be amenable to medical therapy or by septoplasty. The structural deficits involve the septum in its direct relationship to the bony and cartilaginous nasal framework.     The risks and benefits of the procedure were discussed in clinic but also in the preoperative area. The risks discussed included bleeding with need for intervention, scarring, infection, shifting of the nasal framework, incomplete correction of nasal obstruction, contour deformities, vasomotor rhinitis and changes to the external appearance of the nose. The possibility of future need for additional procedures was also discussed. We also discussed the post operative care and expected course.     All questions were answered and the patient signed consent for the above mentioned procedures.     PREOPERATIVE DIAGNOSES:    Septal deviation.   Nasal obstruction.   Nasal valve stenosis.     Bilateral inferior turbinate hypertrophy      POSTOPERATIVE DIAGNOSES:      Septal deviation.   Nasal obstruction.   Nasal valve stenosis.     Bilateral inferior turbinate hypertrophy       ANESTHESIA:    General endotracheal anesthesia  Local 1% lidocaine with 1:100,000 epinephrine 10 cc's   Topical 4% cocaine intranasally on pledgets      IMPLANT DEVICES:   Bilateral Aden splints, Aquaplast nasal splint over the nasal dorsum.       SPECIMENS:  None.       COMPLICATIONS:  None.       BLOOD LOSS:  75 mL         SURGEON'S NARRATIVE:       FINDINGS:  The patient had a significant leftward deviation of the caudal septum with near complete obstruction of the internal nasal valve. He had generally soft cartilaginous framework. Bilateral inferior turbinate hypertrophy. Medial displacement of the left nasal bone, lateral displacement of the right nasal bone.    Grafts:   Bilateral  grafts, septal cartilage, costal cartilage, conchal cartilage    Caudal septal extension graft, septal cartilage, costal cartilage, conchal cartilage     Reconstruction: Complete septal L-strut reconstruction with autograft septal cartilage     Osteotomies:  Bilateral medial fading  Bilateral lateral      DESCRIPTION OF PROCEDURE:      The patient was brought back to the operating room by the Anesthesiology staff. They were laid supine on the operating room table and induced into general anesthesia with the endotracheal tube secured at the midline of the chin.  The head of the bed was then turned 90 degrees towards the surgical team.  Arms were tucked at the side with all pressure points appropriately padded.  A time-out procedure was performed with all members of the operating room staff in agreement of the site of surgery, the patient identification and surgery to be performed. The nasal block was then performed with 1% lidocaine with 1:100,000 epinephrine and four-percent cocaine nasal pledgets were placed topically into bilateral nasal passages. The face was prepped and draped in usual sterile fashion with ophthalmic diluted Betadine.  The eyes were taped with Tegaderm.  Subsequently, surgery began.       A columellar incision was made with an #11 blade scalpel, and subsequently marginal incisions were made with a #15 blade.  The soft tissue envelope in a sub-SMAS plane was elevated off of the lower lateral cartilages.  This exposed the dome and bilateral lower lateral cartilages.  Subsequently, dissection was followed cranially.   This was done in the sub-SMAS plane leading to exposure of the scroll region and bilateral upper lateral cartilages.  Once we reached the edge of the nasal bones, a Sukh elevator was used to transition the elevation to a subperiosteal plane.  Subsequently, we divided the domes bilaterally to expose the caudal edge of the septum and the anterior septal angle.       Bilateral mucoperichondrial flaps were elevated using a #15 blade scalpel.  This was significantly tedious and time-consuming, as it had to be meticulously done because he had a lot of scar in this soft tissue plane. Once we elevated the bilateral flaps, we performed the septoplasty after the upper lateral cartilages were divided off of the dorsal septum. Medial fading osteotomies where then performed using a curved guarded osteotome.      A 1.5 cm L-strut dorsally and caudally was preserved of the septum, and subsequently the septum was harvested. Once this was harvested, the mucosa was then reapproximated with a mattress quilting stitch with a 4-0 chromic in a running fashion.     Two  grafts were fashioned from the harvested septal cartilage homograft rib cartilage. These were placed in between the dorsal septum and the upper lateral cartilage.  They were sutured in place with mattress 5-0 PDS sutures.  The upper lateral cartilages were then resuspended onto the complex. The caudal septum was then approached and set medially. A caudal septal extension graft was placed to support the tip structure.    Lateral fading osteotomies were then performed in a hi-low-hi fashion, allowing for mobilization of the nasal bones.       Bilateral inferior turbinates were then reduced submucosally with the bipolar turbinate cautery and outfractured     The soft tissue envelope was then redraped over the framework.  This demonstrated that the nose was nice and straight and found that the tip had appropriate support. The medial crura were then reapproximated  with through-and-through 4-0 plain gut sutures on a Radhames needle.  The above mentioned grafts were placed. The dome was set in this position, also.    The columellar incision was closed with interrupted 6-0 Monocryl sutures.  Bilateral marginal incisions were then closed with interrupted 4-0 chromic sutures.  The nose was suctioned, the nasopharynx was suctioned, the oropharynx was suctioned, and the stomach was suctioned and emptied of its contents.       We then placed bilateral Aden splints with Bactroban ointment.  Mastisol and Steri-Strip were placed over the nasal dorsum.  The Aquaplast nasal splint was then placed above that. This completed the procedure.      The patient tolerated the procedure well.  There were no complications. The patient was extubated and taken to recovery following commands and breathing spontaneously.       Dr. Vita Hdz was present for the entirety of the procedure.    Nicolle Del Cid MD  [facility] PGY4     I was present, scrubbed for the entire procedure and performed key aspects. I agree with the note.     VITA HDZ MD

## 2019-11-26 NOTE — ANESTHESIA CARE TRANSFER NOTE
Patient: Lev Olvera    Procedure(s):  Septorhinoplasty with repair of nasal valve collaspe, bilateral turbinate reduction,    Diagnosis: Nasal valve collapse [J34.89]  Nasal obstruction [J34.89]  Diagnosis Additional Information: No value filed.    Anesthesia Type:   General     Note:  Airway :Face Mask  Patient transferred to:PACU  Comments: Patient oxygenating and ventilating well on 4LFM.  Patient SANTOS, following commands, and fentanyl titrated for mild pain reported by patient.  Report given to RN and VSS.  Handoff Report: Identifed the Patient, Identified the Reponsible Provider, Reviewed the pertinent medical history, Discussed the surgical course, Reviewed Intra-OP anesthesia mangement and issues during anesthesia, Set expectations for post-procedure period and Allowed opportunity for questions and acknowledgement of understanding      Vitals: (Last set prior to Anesthesia Care Transfer)    [facility] VITALS  11/26/2019 1509 - 11/26/2019 1546      11/26/2019             EKG:  Sinus rhythm                Electronically Signed By: ADRI Costa CRNA  November 26, 2019  3:46 PM

## 2019-11-26 NOTE — ANESTHESIA POSTPROCEDURE EVALUATION
Anesthesia POST Procedure Evaluation    Patient: Lev Olvera   MRN:     [id number] Gender:   male   Age:    49 year old :      1970        Preoperative Diagnosis: Nasal valve collapse [J34.89]  Nasal obstruction [J34.89]   Procedure(s):  Septorhinoplasty with repair of nasal valve collaspe, bilateral turbinate reduction,   Postop Comments: No value filed.       Anesthesia Type:  Not documented  General    Reportable Event: NO     PAIN: Uncomplicated   Sign Out status: Comfortable, Well controlled pain     PONV: No PONV   Sign Out status:  No Nausea or Vomiting     Neuro/Psych: Uneventful perioperative course   Sign Out Status: Preoperative baseline; Age appropriate mentation     Airway/Resp.: Uneventful perioperative course   Sign Out Status: Non labored breathing, age appropriate RR; Resp. Status within EXPECTED Parameters     CV: Uneventful perioperative course   Sign Out status: Appropriate BP and perfusion indices; Appropriate HR/Rhythm     Disposition:   Sign Out in:  PACU  Disposition:  Phase II; Home  Recovery Course: Uneventful  Follow-Up: Not required           Last Anesthesia Record Vitals:  [facility] VITALS  2019 1509 - 2019 1602      2019             EKG:  Sinus rhythm          Last PACU Vitals:  Vitals Value Taken Time   /97 2019  4:00 PM   Temp 36.7  C (98  F) 2019  3:45 PM   Pulse 88 2019  4:00 PM   Resp 156 2019  3:45 PM   SpO2 99 % 2019  4:01 PM   Temp src     NIBP 151/100 2019  3:43 PM   Pulse 90 2019  3:43 PM   SpO2 98 % 2019  3:43 PM   Resp     Temp     Ht Rate     Temp 2     Vitals shown include unvalidated device data.      Electronically Signed By: Malathi Dalal MD, 2019, 4:02 PM

## 2019-11-27 ENCOUNTER — PATIENT OUTREACH (OUTPATIENT)
Dept: CARE COORDINATION | Facility: CLINIC | Age: 49
End: 2019-11-27

## 2019-11-27 VITALS
HEART RATE: 65 BPM | HEIGHT: 75 IN | BODY MASS INDEX: 26.53 KG/M2 | SYSTOLIC BLOOD PRESSURE: 114 MMHG | TEMPERATURE: 98.8 F | OXYGEN SATURATION: 95 % | DIASTOLIC BLOOD PRESSURE: 74 MMHG | WEIGHT: 213.41 LBS | RESPIRATION RATE: 16 BRPM

## 2019-11-27 PROCEDURE — 25000132 ZZH RX MED GY IP 250 OP 250 PS 637: Performed by: STUDENT IN AN ORGANIZED HEALTH CARE EDUCATION/TRAINING PROGRAM

## 2019-11-27 PROCEDURE — G0378 HOSPITAL OBSERVATION PER HR: HCPCS

## 2019-11-27 RX ORDER — ECHINACEA PURPUREA EXTRACT 125 MG
TABLET ORAL
Qty: 480 ML | Refills: 1 | Status: SHIPPED | OUTPATIENT
Start: 2019-11-27 | End: 2021-02-25

## 2019-11-27 RX ORDER — MUPIROCIN 20 MG/G
OINTMENT TOPICAL 3 TIMES DAILY
Qty: 30 G | Refills: 1 | Status: SHIPPED | OUTPATIENT
Start: 2019-11-27 | End: 2020-02-28

## 2019-11-27 RX ADMIN — LISINOPRIL 20 MG: 20 TABLET ORAL at 08:09

## 2019-11-27 RX ADMIN — SALINE NASAL SPRAY 2 SPRAY: 1.5 SOLUTION NASAL at 06:24

## 2019-11-27 RX ADMIN — AMLODIPINE BESYLATE 5 MG: 5 TABLET ORAL at 08:09

## 2019-11-27 NOTE — PLAN OF CARE
Oxygen saturatioin > 90% on room air: Yes  Ambulating independently: Yes  Voiding independently: Yes  Pain controlled on oral regimen: Yes  Tolerating regular diet: Yes    O2 sats 95-96% on RA. Nasal drainage present, gauze holding nose piece in place. Given nasal spray and utilizing frequently. Call light in reach, able to make needs known. Will continue to monitor respiratory status overnight and follow POC.

## 2019-11-27 NOTE — PROGRESS NOTES
- Oxygen saturatioin > 90% on room air: Yes  - Ambulating independently: Yes   - Voiding independently: Yes   - Pain controlled on oral regimen: Yes   - Tolerating regular diet: Yes    Pt discharge home. All belongings sent with pt and spouse. Discharge instruction gone over with pt and spouse. Pt and spouse refused to fill medication in discharge pharmacy. Stated they will fill at their pharmacy if needed.

## 2019-11-27 NOTE — DISCHARGE SUMMARY
Discharge Summary  Lev Olvera  [id number]  1970    Date of Admission: 11/26/2019  Date of Discharge: 11/27/2019    Admission Diagnosis: Nasal valve collapse [J34.89]  Nasal obstruction [J34.89]  Discharge Diagnosis: Same    Procedures:  Date: 11/25/2019  Procedure(s):  Septorhinoplasty with repair of nasal valve collaspe, bilateral turbinate reduction,    Pathology: None    HPI: Lev Olvera is a 49 year old male with history of Sever KATHERINE on CPAP (AHI 44) and HTN. Who presented to us with left sided persistent nasal obstruction without relief from nasal steroid sprays. Examination revealed sever septal deviation with nasal obstruction, left enlarged inferior turbinates, and right nasal valve collapse.      It was recommended that he undergo operative intervention and the patient consented to the above procedure after detailed explanation of the risks and benefits of said procedure.    Hospital Course: The patient was admitted to the hospital and underwent the above mentioned procedure. He tolerated the procedure without any intra- or maryanne-operative complications. Please see the operative report for full details of the procedure. The patient was admitted for post-operative monitoring. His postoperative course was uneventful. At discharge, the patient's pain was well controlled, the patient was voiding on his own, and he was ambulating and tolerating a regular diet.     Discharge Exam:  Vitals:    11/26/19 1847 11/26/19 2200 11/27/19 0300 11/27/19 0615   BP: (!) 145/95 106/64 98/61 114/74   BP Location:  Left arm Left arm Left arm   Pulse: 99 101 71 65   Resp:  16 16 16   Temp: 98.6  F (37  C) 98.5  F (36.9  C) 98.2  F (36.8  C) 98.8  F (37.1  C)   TempSrc: Oral Oral Oral Oral   SpO2:  94% 96% 95%   Weight:       Height:           General: A&O x 3, No acute distress  HEENT: PERRL, EOMI without spontaneous or gaze evoked nystagmus. Aquaplast in place over dorsum of nose. No anterior drainage.    Respiratory: Breathing non-labored on room air, no stridor, no accessory muscle use.     Discharge Medications:   Lev Olvera   Home Medication Instructions RACH:[id number]    Printed on:11/27/19 1200   Medication Information                      amLODIPine (NORVASC) 5 MG tablet  Take 1 tablet (5 mg) by mouth daily             lisinopril (PRINIVIL/ZESTRIL) 20 MG tablet  Take 1 tablet (20 mg) by mouth daily             oxyCODONE (ROXICODONE) 5 MG tablet  Take 1 tablet (5 mg) by mouth every 6 hours as needed for pain                 Discharge Procedure Orders   Reason for your hospital stay   Order Comments: You were in the hospital for airway monitoring after a septorhinoplasty.     Adult [facility] Follow-up and recommended labs and tests   Order Comments: You have a follow up appointment scheduled with Dr. Driscoll on 12/4/19 at 11:20am.     Activity   Order Comments: Do not lift anything greater than 10lbs for the next 2 weeks.     Order Specific Question Answer Comments   Is discharge order? Yes      Wound care and dressings   Order Comments: See the handout given to you for further detail.  You have an external nasal splint on your nose. If this falls off, tape it back on. You also have steri-strips on top of your nose. These will be removed in clinic at your follow up appointment.  You have internal nasal splints sutured in place. These will be removed in clinic. Use the nasal saline spray as prescribed to minimize crusting of the splints.     Diet   Order Comments: Regular diet.     Order Specific Question Answer Comments   Is discharge order? Yes        Dispo: To home in good condition. All of the patient's questions/concerns have been addressed at this time.     Nallely Tracey, PGY1 Otolaryngology Head and Neck Surgery   Otolaryngology-Head & Neck Surgery  Please contact ENT by dialing [phone] and entering job code [phone].

## 2019-11-27 NOTE — PLAN OF CARE
- Oxygen saturatioin > 90% on room air: Yes  - Ambulating independently: Yes   - Voiding independently: Yes   - Pain controlled on oral regimen: Yes   - Tolerating regular diet: Yes

## 2019-11-29 NOTE — PROGRESS NOTES
Patient was called three times and no answer so post 24 hr DC follow up calls will be closed out, message was left with contact number for department seen by or following up      See the handout given to you for further detail.  You have an external nasal splint on your nose. If this falls off, tape it back on. You also have steri-strips on top of your nose. These will be removed in clinic at your follow up appointment.  You have internal nasal splints sutured in place. These will be removed in clinic. Use the nasal saline spray as prescribed to minimize crusting of the splints     You have a follow up appointment scheduled with Dr. Driscoll on 12/4/19 at 11:20am.

## 2019-12-04 ENCOUNTER — OFFICE VISIT (OUTPATIENT)
Dept: OTOLARYNGOLOGY | Facility: CLINIC | Age: 49
End: 2019-12-04
Payer: COMMERCIAL

## 2019-12-04 DIAGNOSIS — Z98.890 POSTOPERATIVE STATE: Primary | ICD-10-CM

## 2019-12-04 ASSESSMENT — PAIN SCALES - GENERAL: PAINLEVEL: NO PAIN (0)

## 2019-12-04 NOTE — LETTER
12/4/2019       RE: Lev Olvera  [address]  [facility] [address]     Dear Colleague,    Thank you for referring your patient, Lev Olvera, to the [facility] EAR NOSE AND THROAT at [facility]. Please see a copy of my visit note below.    Facial Plastic and Reconstructive Surgery Post Op Note    Lev Olvera presents for post operative evaluation today, one week after an open septorhinoplasty for nasal obstruction     SUBJECTIVE:  The patient has been doing well with no complaints and in healing appropriately.  Nasal saline has been used.    EXAM:  Nasal splints and nasal cast was removed today. The skin was cleaned to remove adhesive. Bilateral nasal passages were cleaned of mucus.     On physical examination there is evident edema.  Ecchymosis in the periorbital regions is present.  Columellar incision appears to be well-healed and sutures were trimmed.      ASSESSMENT AND PLAN:    Lev Olvera Is doing quite well after surgery.  Healing is per routine and there are no concerns.  A discussion was had with the patient today about appropriate wound care which includes daily cleaning and ointment to the incision line in addition to nasal saline to bilateral nostrils.  We instructed that ice could be used for comfort however at this point will not be helping the swelling.  We reiterated to the patient the length of time that it takes for the swelling of the nasal skin to resolve in addition to discussing the gradual improvement of sensation to the nasal skin.  We discussed once again that it takes a full year to fully recover from the surgery and at this point do not anticipate any concerns.    Patient will follow up with me in 2 months time or were sooner any concerns arise.      Again, thank you for allowing me to participate in the care of your patient.      Sincerely,    Alberta Driscoll MD

## 2019-12-04 NOTE — NURSING NOTE
Chief Complaint   Patient presents with     Post-op Visit     DOS 11/26/19     Did not obtain vitals.    Nela Gandara EMT

## 2019-12-04 NOTE — PROGRESS NOTES
Facial Plastic and Reconstructive Surgery Post Op Note    Lev Olvera presents for post operative evaluation today, one week after an open septorhinoplasty for nasal obstruction     SUBJECTIVE:  The patient has been doing well with no complaints and in healing appropriately.  Nasal saline has been used.    EXAM:  Nasal splints and nasal cast was removed today. The skin was cleaned to remove adhesive. Bilateral nasal passages were cleaned of mucus.     On physical examination there is evident edema.  Ecchymosis in the periorbital regions is present.  Columellar incision appears to be well-healed and sutures were trimmed.      ASSESSMENT AND PLAN:    Lev Olvera Is doing quite well after surgery.  Healing is per routine and there are no concerns.  A discussion was had with the patient today about appropriate wound care which includes daily cleaning and ointment to the incision line in addition to nasal saline to bilateral nostrils.  We instructed that ice could be used for comfort however at this point will not be helping the swelling.  We reiterated to the patient the length of time that it takes for the swelling of the nasal skin to resolve in addition to discussing the gradual improvement of sensation to the nasal skin.  We discussed once again that it takes a full year to fully recover from the surgery and at this point do not anticipate any concerns.    Patient will follow up with me in 2 months time or were sooner any concerns arise.

## 2019-12-16 ENCOUNTER — MYC MEDICAL ADVICE (OUTPATIENT)
Dept: OTOLARYNGOLOGY | Facility: CLINIC | Age: 49
End: 2019-12-16

## 2019-12-17 NOTE — TELEPHONE ENCOUNTER
Pt's wife sent the pictures of pt's nose to my direct dial and I forwarded them to Dr. Driscoll.  Dr. Driscoll does not see anything of concern and called the pt's wife directly to speak to her.    Diana Radford RN  12/17/2019 2:45 PM

## 2019-12-17 NOTE — TELEPHONE ENCOUNTER
Left voicemail for pt's wife, Cesia, and asked her to text me the photos of pt's nose since they are blurry in MyChart.    Will forward the photos to Dr. Driscoll once I receive them.    Diana Radford RN  12/17/2019 12:22 PM

## 2020-01-15 ENCOUNTER — OFFICE VISIT (OUTPATIENT)
Dept: OTOLARYNGOLOGY | Facility: CLINIC | Age: 50
End: 2020-01-15

## 2020-01-15 VITALS — WEIGHT: 216 LBS | BODY MASS INDEX: 26.86 KG/M2 | HEIGHT: 75 IN

## 2020-01-15 DIAGNOSIS — J34.89 NASAL OBSTRUCTION: Primary | ICD-10-CM

## 2020-01-15 ASSESSMENT — PAIN SCALES - GENERAL: PAINLEVEL: NO PAIN (0)

## 2020-01-15 ASSESSMENT — MIFFLIN-ST. JEOR: SCORE: 1930.4

## 2020-01-15 NOTE — LETTER
1/15/2020       RE: Lev Olvera  [address]  [place] [address]     Dear Colleague,    Thank you for referring your patient, Lev Olvera, to the [facility] EAR NOSE AND THROAT at [facility]. Please see a copy of my visit note below.    Facial Plastic and Reconstructive Surgery      Lev Olvera is doing well from surgery and is very happy with his breathing. He feels like his breathing is significantly improved.  He has is tolerating his CPAP significantly better but does feel now that the pressure is too strong.  He describes that he sleeping much better.    He tried the nasal cones in the left nostril was felt that they were too small.  He used them 3 days and then was concerned that they were dislodged while he was sleeping.  He notes's depression of the nasal dorsum and fullness of the right nasal bone.  His wife is concerned that this could potentially be from the facemask of the CPAP.  It puts pressure right in the area will receive the depression.  He notes that in the morning it is red from pressure in that area over the nasal dorsum.    On examination he does have an area of flattening and widening in the most distal bony dorsal third with prominence of the right nasal bone.  I have demonstrated how to mold that area.  Anterior anoscopy demonstrates that the medial crural footplate is prominent on the left however the septum is nice and straight and the nasal airways are patent bilaterally.  His skin is dry and slightly erythematous.  He is continues to have some numbness at the tip and some edema as expected.    Assessment and plan:  Healing appropriately from septorhinoplasty.  However notable bony dorsal contour changes that may be attributed to the pressure from the CPAP machine.    I believe he needs re-titration of his CPAP and I also would like him to get measured and assessed for a nasal mask.  I will reach out to his sleep medicine  team.    I would like him to continue consider nasal cones.  We have told him we can offer these in clinic for him.  He would need a larger size.  And I would not want him to use him with the CPAP he could use them when at home.       Again, thank you for allowing me to participate in the care of your patient.      Sincerely,    Alberta Driscoll MD

## 2020-01-15 NOTE — Clinical Note
Braulio Lundberg Could you consider re assessing Lev for his CPAP titatrion and also assess him for a nasal mask instead of a full facial one? I am noting changes to the nasal dorsum in the post op periordThanksSofia

## 2020-01-15 NOTE — PROGRESS NOTES
Facial Plastic and Reconstructive Surgery      Lev Olvera is doing well from surgery and is very happy with his breathing. He feels like his breathing is significantly improved.  He has is tolerating his CPAP significantly better but does feel now that the pressure is too strong.  He describes that he sleeping much better.    He tried the nasal cones in the left nostril was felt that they were too small.  He used them 3 days and then was concerned that they were dislodged while he was sleeping.  He notes's depression of the nasal dorsum and fullness of the right nasal bone.  His wife is concerned that this could potentially be from the facemask of the CPAP.  It puts pressure right in the area will receive the depression.  He notes that in the morning it is red from pressure in that area over the nasal dorsum.    On examination he does have an area of flattening and widening in the most distal bony dorsal third with prominence of the right nasal bone.  I have demonstrated how to mold that area.  Anterior anoscopy demonstrates that the medial crural footplate is prominent on the left however the septum is nice and straight and the nasal airways are patent bilaterally.  His skin is dry and slightly erythematous.  He is continues to have some numbness at the tip and some edema as expected.    Assessment and plan:  Healing appropriately from septorhinoplasty.  However notable bony dorsal contour changes that may be attributed to the pressure from the CPAP machine.    I believe he needs re-titration of his CPAP and I also would like him to get measured and assessed for a nasal mask.  I will reach out to his sleep medicine team.    I would like him to continue consider nasal cones.  We have told him we can offer these in clinic for him.  He would need a larger size.  And I would not want him to use him with the CPAP he could use them when at home.

## 2020-01-15 NOTE — NURSING NOTE
"Chief Complaint   Patient presents with     Post-op Visit     DOS 11/26/20     Height 1.905 m (6' 3\"), weight 98 kg (216 lb).    Nela Gandara, EMT  "

## 2020-01-16 DIAGNOSIS — G47.33 OSA (OBSTRUCTIVE SLEEP APNEA): Primary | ICD-10-CM

## 2020-01-16 NOTE — NURSING NOTE
Pt. Is PO 11/26/2019 Septorhinoplasty with Bilateral Turbinate Reduction and Bilateral Nasal Valve Repair.    Pt. Is pleased with his breathing and is pleased with how his nose has healed thus far.  He said he has tried nasal cones at night time but didn't feel that they were beneficial to him.    Pt. To follow up with Dr. Driscoll in 3 mos.    Diana Radford RN  1/16/2020 12:13 PM

## 2020-01-17 ENCOUNTER — TELEPHONE (OUTPATIENT)
Dept: SLEEP MEDICINE | Facility: CLINIC | Age: 50
End: 2020-01-17

## 2020-01-17 DIAGNOSIS — G47.33 OSA (OBSTRUCTIVE SLEEP APNEA): Primary | ICD-10-CM

## 2020-01-17 NOTE — TELEPHONE ENCOUNTER
Phone call made.     Patient doing well after ENT surgery. He wants to try a nasal mask and wants to reduce pressure if he can.     Patient's DME is [facility] and he will contact [facility] for mask change.     He is on auto PAP therapy with pressure range 5-15 cm. We can try a lower pressure range of 4-12 cm H2O.

## 2020-02-28 ENCOUNTER — OFFICE VISIT (OUTPATIENT)
Dept: SLEEP MEDICINE | Facility: CLINIC | Age: 50
End: 2020-02-28
Payer: COMMERCIAL

## 2020-02-28 VITALS
WEIGHT: 221 LBS | HEART RATE: 63 BPM | DIASTOLIC BLOOD PRESSURE: 71 MMHG | HEIGHT: 75 IN | OXYGEN SATURATION: 97 % | BODY MASS INDEX: 27.48 KG/M2 | RESPIRATION RATE: 16 BRPM | SYSTOLIC BLOOD PRESSURE: 116 MMHG

## 2020-02-28 DIAGNOSIS — G47.33 OSA (OBSTRUCTIVE SLEEP APNEA): Primary | ICD-10-CM

## 2020-02-28 PROCEDURE — 99213 OFFICE O/P EST LOW 20 MIN: CPT | Performed by: INTERNAL MEDICINE

## 2020-02-28 ASSESSMENT — MIFFLIN-ST. JEOR: SCORE: 1953.08

## 2020-02-28 NOTE — PATIENT INSTRUCTIONS
Your Body mass index is 27.62 kg/m .  Weight management is a personal decision.  If you are interested in exploring weight loss strategies, the following discussion covers the approaches that may be successful. Body mass index (BMI) is one way to tell whether you are at a healthy weight, overweight, or obese. It measures your weight in relation to your height.  A BMI of 18.5 to 24.9 is in the healthy range. A person with a BMI of 25 to 29.9 is considered overweight, and someone with a BMI of 30 or greater is considered obese. More than two-thirds of American adults are considered overweight or obese.  Being overweight or obese increases the risk for further weight gain. Excess weight may lead to heart disease and diabetes.  Creating and following plans for healthy eating and physical activity may help you improve your health.  Weight control is part of healthy lifestyle and includes exercise, emotional health, and healthy eating habits. Careful eating habits lifelong are the mainstay of weight control. Though there are significant health benefits from weight loss, long-term weight loss with diet alone may be very difficult to achieve- studies show long-term success with dietary management in less than 10% of people. Attaining a healthy weight may be especially difficult to achieve in those with severe obesity. In some cases, medications, devices and surgical management might be considered.  What can you do?  If you are overweight or obese and are interested in methods for weight loss, you should discuss this with your provider.     Consider reducing daily calorie intake by 500 calories.     Keep a food journal.     Avoiding skipping meals, consider cutting portions instead.    Diet combined with exercise helps maintain muscle while optimizing fat loss. Strength training is particularly important for building and maintaining muscle mass. Exercise helps reduce stress, increase energy, and improves fitness.  Increasing exercise without diet control, however, may not burn enough calories to loose weight.       Start walking three days a week 10-20 minutes at a time    Work towards walking thirty minutes five days a week     Eventually, increase the speed of your walking for 1-2 minutes at time    In addition, we recommend that you review healthy lifestyles and methods for weight loss available through the National Institutes of Health patient information sites:  http://win.niddk.nih.gov/publications/index.htm    And look into health and wellness programs that may be available through your health insurance provider, employer, local community center, or hima club.

## 2020-02-28 NOTE — PROGRESS NOTES
Obstructive Sleep Apnea - PAP Follow-Up Visit:    Chief Complaint   Patient presents with     CPAP Follow Up       eLv Olvera comes in today for follow-up of their severe sleep apnea, managed with CPAP.     PSG on 6/23/2019 showed an AHI of 44.7 per hour.     Patient underwent septorhinoplasty and turbinate reduction in Nov 2019.     Overall, he rates the experience with PAP as 8 (0 poor, 10 great). The mask is comfortable. The mask is not leaking.  He is not snoring with the mask on. He is not having gasp arousals.  He is not having significant oral/nasal dryness. The pressure settings are comfortable.     He uses full-face mask.     Bedtime is typically 11 pm. Usually it takes about 10 minutes to fall asleep with the mask on. Wake time is typically 6:30 am.  Patient is using PAP therapy 6-7 hours per night. The patient is usually getting 7 hours of sleep per night.    He does feel rested in the morning.    [place] Sleepiness Scale: 4/24    [employer]     Auto-PAP 5-15 cmH2O download:  67/90 total days of use. 23 nonuse days. 72% days with >4 hours use.  Average use 6 hours 30 minutes per day. Median Leak 2.4 L/min. 95%ile Leak 14 L/min. CPAP 95% pressure 13.7cm. AHI 5.6      Reviewed by team:      Reviewed by provider:        Problem List:  Patient Active Problem List    Diagnosis Date Noted     Status post rhinoplasty 11/26/2019     Priority: Medium     Nasal obstruction 09/11/2019     Priority: Medium     Nasal valve collapse 09/11/2019     Priority: Medium     Deviated nasal septum 09/11/2019     Priority: Medium     KATHERINE (obstructive sleep apnea) 06/25/2019     Priority: Medium       6/23/2019 [facility] Sleep Study (214.0 lbs) - AHI 44.7, RDI 49.1, Supine AHI 44.7, REM AHI 74.3, Low O2% 67.1%, Time Spent ?88% 10.6, Time Spent ?89% 12.4. Treatment was titrated to a pressure of CPAP 14 with an AHI 1.1. Time spent in REM supine at this pressure was 5.0 minutes.       Benign essential hypertension  "01/17/2019     Priority: Medium          /71   Pulse 63   Resp 16   Ht 1.905 m (6' 3\")   Wt 100.2 kg (221 lb)   SpO2 97%   BMI 27.62 kg/m      Impression/Plan:     Severe sleep apnea.     -  Tolerating PAP well except for some discomfort with mask and condensation in the mask. We adjusted humidity and increased ramp time. Daytime symptoms are improved. Regular compliance and acceptable AHI is demonstrated on download.     Plan:     1. Continue auto PAP therapy     Lve Olvera will follow up in about 1 year(s).     Fifteen minutes spent with patient, all of which were spent face-to-face counseling, consulting, coordinating plan of care.      Torsten Lundberg MD, MD    CC:  Deejay De Los Santos,   "

## 2020-12-21 DIAGNOSIS — I10 BENIGN ESSENTIAL HYPERTENSION: ICD-10-CM

## 2020-12-21 NOTE — LETTER
[facility]  [address] [id number] [phone]            Lev Olvera  [address]  [facility] [place] [address]        December 22, 2020    Dear Lev,    While refilling your prescription today, we noticed that you are due for an appointment with your provider.  We will refill your prescription for 30 days, but a follow-up appointment must be made before any additional refills can be approved.     Taking care of your health is important to us and we look forward to seeing you in the near future.  Please call us at [phone] or [phone] (or use [employer]) to schedule an appointment.     Please disregard this notice if you have already made an appointment.    Sincerely,        [facility]

## 2020-12-22 RX ORDER — AMLODIPINE BESYLATE 5 MG/1
5 TABLET ORAL DAILY
Qty: 90 TABLET | Refills: 0 | Status: SHIPPED | OUTPATIENT
Start: 2020-12-22 | End: 2021-03-17

## 2020-12-22 RX ORDER — LISINOPRIL 20 MG/1
20 TABLET ORAL DAILY
Qty: 90 TABLET | Refills: 0 | Status: SHIPPED | OUTPATIENT
Start: 2020-12-22 | End: 2021-03-17

## 2021-01-15 ENCOUNTER — HEALTH MAINTENANCE LETTER (OUTPATIENT)
Age: 51
End: 2021-01-15

## 2021-02-25 VITALS — WEIGHT: 207 LBS | BODY MASS INDEX: 25.74 KG/M2 | HEIGHT: 75 IN

## 2021-02-25 ASSESSMENT — MIFFLIN-ST. JEOR: SCORE: 1884.58

## 2021-02-25 NOTE — PATIENT INSTRUCTIONS
Your BMI is Body mass index is 25.87 kg/m .  Weight management is a personal decision.  If you are interested in exploring weight loss strategies, the following discussion covers the approaches that may be successful. Body mass index (BMI) is one way to tell whether you are at a healthy weight, overweight, or obese. It measures your weight in relation to your height.  A BMI of 18.5 to 24.9 is in the healthy range. A person with a BMI of 25 to 29.9 is considered overweight, and someone with a BMI of 30 or greater is considered obese. More than two-thirds of American adults are considered overweight or obese.  Being overweight or obese increases the risk for further weight gain. Excess weight may lead to heart disease and diabetes.  Creating and following plans for healthy eating and physical activity may help you improve your health.  Weight control is part of healthy lifestyle and includes exercise, emotional health, and healthy eating habits. Careful eating habits lifelong are the mainstay of weight control. Though there are significant health benefits from weight loss, long-term weight loss with diet alone may be very difficult to achieve- studies show long-term success with dietary management in less than 10% of people. Attaining a healthy weight may be especially difficult to achieve in those with severe obesity. In some cases, medications, devices and surgical management might be considered.  What can you do?  If you are overweight or obese and are interested in methods for weight loss, you should discuss this with your provider.     Consider reducing daily calorie intake by 500 calories.     Keep a food journal.     Avoiding skipping meals, consider cutting portions instead.    Diet combined with exercise helps maintain muscle while optimizing fat loss. Strength training is particularly important for building and maintaining muscle mass. Exercise helps reduce stress, increase energy, and improves fitness.  Increasing exercise without diet control, however, may not burn enough calories to loose weight.       Start walking three days a week 10-20 minutes at a time    Work towards walking thirty minutes five days a week     Eventually, increase the speed of your walking for 1-2 minutes at time    In addition, we recommend that you review healthy lifestyles and methods for weight loss available through the National Institutes of Health patient information sites:  http://win.niddk.nih.gov/publications/index.htm    And look into health and wellness programs that may be available through your health insurance provider, employer, local community center, or hima club.            Your Body mass index is 25.87 kg/m .  Weight management is a personal decision.  If you are interested in exploring weight loss strategies, the following discussion covers the approaches that may be successful. Body mass index (BMI) is one way to tell whether you are at a healthy weight, overweight, or obese. It measures your weight in relation to your height.  A BMI of 18.5 to 24.9 is in the healthy range. A person with a BMI of 25 to 29.9 is considered overweight, and someone with a BMI of 30 or greater is considered obese. More than two-thirds of American adults are considered overweight or obese.  Being overweight or obese increases the risk for further weight gain. Excess weight may lead to heart disease and diabetes.  Creating and following plans for healthy eating and physical activity may help you improve your health.  Weight control is part of healthy lifestyle and includes exercise, emotional health, and healthy eating habits. Careful eating habits lifelong are the mainstay of weight control. Though there are significant health benefits from weight loss, long-term weight loss with diet alone may be very difficult to achieve- studies show long-term success with dietary management in less than 10% of people. Attaining a healthy weight may be  especially difficult to achieve in those with severe obesity. In some cases, medications, devices and surgical management might be considered.  What can you do?  If you are overweight or obese and are interested in methods for weight loss, you should discuss this with your provider.     Consider reducing daily calorie intake by 500 calories.     Keep a food journal.     Avoiding skipping meals, consider cutting portions instead.    Diet combined with exercise helps maintain muscle while optimizing fat loss. Strength training is particularly important for building and maintaining muscle mass. Exercise helps reduce stress, increase energy, and improves fitness. Increasing exercise without diet control, however, may not burn enough calories to loose weight.       Start walking three days a week 10-20 minutes at a time    Work towards walking thirty minutes five days a week     Eventually, increase the speed of your walking for 1-2 minutes at time    In addition, we recommend that you review healthy lifestyles and methods for weight loss available through the National Institutes of Health patient information sites:  http://win.niddk.nih.gov/publications/index.htm    And look into health and wellness programs that may be available through your health insurance provider, employer, local community center, or hima club.    {Weight Management Plan -- Delete if patient has a normal BMI:256373}

## 2021-02-25 NOTE — PROGRESS NOTES
Lev is a 51 year old who is being evaluated via a billable video visit.      How would you like to obtain your AVS? MyChart  If the video visit is dropped, the invitation should be resent by: Text to cell phone: [phone]  Will anyone else be joining your video visit? No      Video Start Time: 1:26 PM  Video-Visit Details    Type of service:  Video Visit    Video End Time:1:40 PM    Originating Location (pt. Location): Home    Distant Location (provider location):  [facility]     Platform used for Video Visit: Chaim JAMIL CMA, [facility], 2/25/2021 1:51 PM    Obstructive Sleep Apnea - PAP Follow-Up Visit:    Chief Complaint   Patient presents with     CPAP Follow Up       Lev Olvera comes in today for follow-up of their severe sleep apnea, managed with CPAP.     PSG on 6/23/2019 showed an AHI of 44.7 per hour.     Overall, he rates the experience with PAP as ok. The mask is comfortable. The mask is not leaking.  He is not snoring with the mask on. He is not having gasp arousals.  He is not having significant oral/nasal dryness. The pressure settings are comfortable.     He uses full-face mask.     Bedtime is typically 10:30 - 11 pm. Usually it takes about 15 minutes to fall asleep with the mask on. Wake time is typically 6 am.  Patient is using PAP therapy 7 hours per night. The patient is usually getting 7 hours of sleep per night.    He does feel rested in the morning.    Total score - [place]: 3 (2/25/2021  1:00 PM)    ResMed     Auto-PAP 5-15 cmH2O download:  29/30 total days of use. 1 nonuse days. 97% days with >4 hours use.  Average use 6 hours 44 minutes per day. Median Leak 3.6 L/min. 95%ile Leak 16.1 L/min. CPAP 95% pressure 13.9 cm. AHI 6.2 (central-0.3, obstructive- 3.3, HI- 1.9)    Reviewed by team: Tobacco  Allergies  Meds            Reviewed by provider:                Problem List:  Patient Active Problem List    Diagnosis Date Noted     Status post  "rhinoplasty 11/26/2019     Priority: Medium     Nasal obstruction 09/11/2019     Priority: Medium     Nasal valve collapse 09/11/2019     Priority: Medium     Deviated nasal septum 09/11/2019     Priority: Medium     KATHERINE (obstructive sleep apnea) 06/25/2019     Priority: Medium       6/23/2019 [facility] Sleep Study (214.0 lbs) - AHI 44.7, RDI 49.1, Supine AHI 44.7, REM AHI 74.3, Low O2% 67.1%, Time Spent ?88% 10.6, Time Spent ?89% 12.4. Treatment was titrated to a pressure of CPAP 14 with an AHI 1.1. Time spent in REM supine at this pressure was 5.0 minutes.       Benign essential hypertension 01/17/2019     Priority: Medium          Ht 1.905 m (6' 3\")   Wt 93.9 kg (207 lb)   BMI 25.87 kg/m      Impression/Plan:     Severe sleep apnea.     -  Tolerating PAP well. Daytime symptoms are stable..     - I reviewed data from his CPAP which shows regular compliance and a residual AHI of 6 per hour and CPAP 90% pressure of 13.9 cm H2O. We reviewed increasing the min EPAP to see if it will lower the AHI further.     Plan:     1. Continue auto PAP therapy with pressure range changed to 8-15 cm H2O     Lev Olvera will follow up in about 1 year(s).     I spent a total of 20 minutes for this appointment today which include time spent before, during and after the visit for patient care, counseling and coordination of care.      Torsten Lundberg MD    CC:  Deejay De Los Santos,   "

## 2021-02-26 ENCOUNTER — VIRTUAL VISIT (OUTPATIENT)
Dept: SLEEP MEDICINE | Facility: CLINIC | Age: 51
End: 2021-02-26
Payer: COMMERCIAL

## 2021-02-26 DIAGNOSIS — G47.33 OSA (OBSTRUCTIVE SLEEP APNEA): Primary | ICD-10-CM

## 2021-02-26 PROCEDURE — 99213 OFFICE O/P EST LOW 20 MIN: CPT | Mod: 95 | Performed by: INTERNAL MEDICINE

## 2021-03-10 DIAGNOSIS — I10 BENIGN ESSENTIAL HYPERTENSION: ICD-10-CM

## 2021-03-11 ENCOUNTER — MYC MEDICAL ADVICE (OUTPATIENT)
Dept: INTERNAL MEDICINE | Facility: CLINIC | Age: 51
End: 2021-03-11

## 2021-03-12 NOTE — TELEPHONE ENCOUNTER
Routing refill request to provider for review/approval because:  Labs not current:  CMP    Patient needs to be seen because it has been more than 1 year since last office visit.  No current BP readings

## 2021-03-17 ENCOUNTER — MYC REFILL (OUTPATIENT)
Dept: INTERNAL MEDICINE | Facility: CLINIC | Age: 51
End: 2021-03-17

## 2021-03-17 DIAGNOSIS — I10 BENIGN ESSENTIAL HYPERTENSION: ICD-10-CM

## 2021-03-17 RX ORDER — LISINOPRIL 20 MG/1
TABLET ORAL
Qty: 90 TABLET | Refills: 0 | Status: SHIPPED | OUTPATIENT
Start: 2021-03-17 | End: 2021-04-08

## 2021-03-17 RX ORDER — AMLODIPINE BESYLATE 5 MG/1
TABLET ORAL
Qty: 90 TABLET | Refills: 0 | Status: SHIPPED | OUTPATIENT
Start: 2021-03-17 | End: 2021-04-08

## 2021-03-19 RX ORDER — LISINOPRIL 20 MG/1
20 TABLET ORAL DAILY
Qty: 90 TABLET | Refills: 0 | OUTPATIENT
Start: 2021-03-19

## 2021-03-19 RX ORDER — AMLODIPINE BESYLATE 5 MG/1
5 TABLET ORAL DAILY
Qty: 90 TABLET | Refills: 0 | OUTPATIENT
Start: 2021-03-19

## 2021-04-08 ENCOUNTER — OFFICE VISIT (OUTPATIENT)
Dept: INTERNAL MEDICINE | Facility: CLINIC | Age: 51
End: 2021-04-08
Payer: COMMERCIAL

## 2021-04-08 VITALS
TEMPERATURE: 98.1 F | SYSTOLIC BLOOD PRESSURE: 102 MMHG | BODY MASS INDEX: 27.37 KG/M2 | OXYGEN SATURATION: 97 % | WEIGHT: 219 LBS | DIASTOLIC BLOOD PRESSURE: 60 MMHG | HEART RATE: 68 BPM

## 2021-04-08 DIAGNOSIS — Z12.5 SCREENING FOR PROSTATE CANCER: ICD-10-CM

## 2021-04-08 DIAGNOSIS — I10 BENIGN ESSENTIAL HYPERTENSION: Primary | ICD-10-CM

## 2021-04-08 DIAGNOSIS — Z12.11 SCREENING FOR COLON CANCER: ICD-10-CM

## 2021-04-08 PROCEDURE — 99213 OFFICE O/P EST LOW 20 MIN: CPT | Performed by: INTERNAL MEDICINE

## 2021-04-08 RX ORDER — AMLODIPINE BESYLATE 5 MG/1
5 TABLET ORAL DAILY
Qty: 90 TABLET | Refills: 3 | Status: SHIPPED | OUTPATIENT
Start: 2021-04-08 | End: 2022-06-01

## 2021-04-08 RX ORDER — LISINOPRIL 20 MG/1
20 TABLET ORAL DAILY
Qty: 90 TABLET | Refills: 3 | Status: SHIPPED | OUTPATIENT
Start: 2021-04-08 | End: 2022-07-28

## 2021-04-08 NOTE — PROGRESS NOTES
"    Assessment & Plan     Benign essential hypertension  Reasonably well-controlled, continue amlodipine and lisinopril.  - amLODIPine (NORVASC) 5 MG tablet; Take 1 tablet (5 mg) by mouth daily  - lisinopril (ZESTRIL) 20 MG tablet; Take 1 tablet (20 mg) by mouth daily  - Lipid panel reflex to direct LDL Fasting; Future  - Basic metabolic panel; Future    Screening for colon cancer  Agrees to screening  - GASTROENTEROLOGY ADULT REF PROCEDURE ONLY; Future    Screening for prostate cancer  Return soon for surveillance labs.  - Prostate spec antigen screen; Future             BMI:   Estimated body mass index is 27.37 kg/m  as calculated from the following:    Height as of 2/25/21: 1.905 m (6' 3\").    Weight as of this encounter: 99.3 kg (219 lb).           No follow-ups on file.    Deejay De Los Santos MD  [facility] Ohara is a 51 year old who presents for the following health issues     HPI     Hypertension Follow-up      Do you check your blood pressure regularly outside of the clinic? Yes     Are you following a low salt diet? Yes    Are your blood pressures ever more than 140 on the top number (systolic) OR more   than 90 on the bottom number (diastolic), for example 140/90? No      How many servings of fruits and vegetables do you eat daily?  2-3    On average, how many sweetened beverages do you drink each day (Examples: soda, juice, sweet tea, etc.  Do NOT count diet or artificially sweetened beverages)?   2    How many days per week do you exercise enough to make your heart beat faster? 7    How many minutes a day do you exercise enough to make your heart beat faster? 60 or more    How many days per week do you miss taking your medication? 0        Review of Systems   Constitutional, HEENT, cardiovascular, pulmonary, GI, , musculoskeletal, neuro, skin, endocrine and psych systems are negative, except as otherwise noted.      Objective    /60   Pulse 68   " Temp 98.1  F (36.7  C) (Oral)   Wt 99.3 kg (219 lb)   SpO2 97%   BMI 27.37 kg/m    Body mass index is 27.37 kg/m .  Physical Exam   GENERAL: healthy, alert and no distress  NECK: no adenopathy, no asymmetry, masses, or scars and thyroid normal to palpation  RESP: lungs clear to auscultation - no rales, rhonchi or wheezes  CV: regular rate and rhythm, normal S1 S2, no S3 or S4, no murmur, click or rub, no peripheral edema and peripheral pulses strong  ABDOMEN: soft, nontender, no hepatosplenomegaly, no masses and bowel sounds normal  MS: no gross musculoskeletal defects noted, no edema

## 2021-04-08 NOTE — PATIENT INSTRUCTIONS
- Please come in for fasting labs in the next few weeks, at your convenience.  I will be in touch with you when I receive the results.    - [facility] will contact you to schedule your colonoscopy.  Please contact us if you do not hear from them.

## 2021-06-30 ENCOUNTER — OFFICE VISIT (OUTPATIENT)
Dept: OTOLARYNGOLOGY | Facility: CLINIC | Age: 51
End: 2021-06-30
Payer: COMMERCIAL

## 2021-06-30 VITALS — OXYGEN SATURATION: 97 % | TEMPERATURE: 97.7 F | BODY MASS INDEX: 27.37 KG/M2 | HEART RATE: 70 BPM | HEIGHT: 75 IN

## 2021-06-30 DIAGNOSIS — H69.93 DYSFUNCTION OF BOTH EUSTACHIAN TUBES: Primary | ICD-10-CM

## 2021-06-30 PROCEDURE — 99213 OFFICE O/P EST LOW 20 MIN: CPT | Performed by: OTOLARYNGOLOGY

## 2021-06-30 RX ORDER — FLUTICASONE PROPIONATE 50 MCG
2 SPRAY, SUSPENSION (ML) NASAL DAILY
Qty: 15 ML | Refills: 3 | Status: SHIPPED | OUTPATIENT
Start: 2021-06-30 | End: 2021-07-29

## 2021-06-30 ASSESSMENT — PAIN SCALES - GENERAL: PAINLEVEL: NO PAIN (0)

## 2021-06-30 NOTE — LETTER
6/30/2021      RE: Lev Olvera  [address]  [facility] [address]       Facial Plastic and Reconstructive Surgery      Lev Olvera comes in today for follow-up.  He has had past history of a septorhinoplasty in 2019.  He has noted that on the right side of the septum he felt a significant painful lesion that was under the mucosa.  He did not notice an external lesion associated with then that is why he made the appointment because of that lesion.  Has since resolved without any treatment.  He has not had any recurrence of any other type symptoms.    He does describe a little bit of decreased airflow on the right compared to left but is still significantly improved from previously.  He also describes that he has difficulty equalizing his pressure in his middle ear spaces bilaterally.  He feels like he has water in his ears.  He had his primary care look in his ears and there were no concerning findings at that point.  He states that this was present before surgery.  He just is curious about it today.  His hearing has not been affected other than when he feels muffled.    On examination he has healed well from a septorhinoplasty.  The septum is straight with good moisture of the mucosa.  On the right side where he feels some fullness of the ala is where there is a graft placed.  This graft was there purposeful for the external nasal valve collapse and helping support his ala.  I do not see any lesions of his septum or any other concerning findings.      Assessment and plan:    We will follow to see if he has recurrence of any of the symptoms he came in for.  I do not see anything on examination today.  This might have been perhaps a deep stitch with some inflammation around it.  I did say that I could monitor for MRSA as his wife is in the medical field.    I have asked him to see a general ENT for his possible eustachian tube dysfunction.  He would also benefit from an audiogram.  I placed him on  Flonase today.  Sent that prescription to pharmacy.  I have recommended he see Dr. Mark Parrish as that is right by [place] where his wife works and this would be easier for them and coming to the [place].  They agreed.    I spent a total of 20 minutes in the care of patient Lev Olvera during today's office visit. This time includes reviewing the patient's chart and prior history, obtaining a history, performing an examination and evaluation and counseling the patient. This time also includes ordering mediations or tests necessary in addition to communication to other member's of the patient's health care team. Time spent in documentation and care coordination is included.         Alberta Driscoll MD

## 2021-06-30 NOTE — PROGRESS NOTES
Facial Plastic and Reconstructive Surgery      Lev Olvera comes in today for follow-up.  He has had past history of a septorhinoplasty in 2019.  He has noted that on the right side of the septum he felt a significant painful lesion that was under the mucosa.  He did not notice an external lesion associated with then that is why he made the appointment because of that lesion.  Has since resolved without any treatment.  He has not had any recurrence of any other type symptoms.    He does describe a little bit of decreased airflow on the right compared to left but is still significantly improved from previously.  He also describes that he has difficulty equalizing his pressure in his middle ear spaces bilaterally.  He feels like he has water in his ears.  He had his primary care look in his ears and there were no concerning findings at that point.  He states that this was present before surgery.  He just is curious about it today.  His hearing has not been affected other than when he feels muffled.    On examination he has healed well from a septorhinoplasty.  The septum is straight with good moisture of the mucosa.  On the right side where he feels some fullness of the ala is where there is a graft placed.  This graft was there purposeful for the external nasal valve collapse and helping support his ala.  I do not see any lesions of his septum or any other concerning findings.      Assessment and plan:    We will follow to see if he has recurrence of any of the symptoms he came in for.  I do not see anything on examination today.  This might have been perhaps a deep stitch with some inflammation around it.  I did say that I could monitor for MRSA as his wife is in the medical field.    I have asked him to see a general ENT for his possible eustachian tube dysfunction.  He would also benefit from an audiogram.  I placed him on Flonase today.  Sent that prescription to pharmacy.  I have recommended he see Dr. Flores  Shivani as that is right by [place] where his wife works and this would be easier for them and [place].  They agreed.    I spent a total of 20 minutes in the care of patient Lev Olvera during today's office visit. This time includes reviewing the patient's chart and prior history, obtaining a history, performing an examination and evaluation and counseling the patient. This time also includes ordering mediations or tests necessary in addition to communication to other member's of the patient's health care team. Time spent in documentation and care coordination is included.

## 2021-06-30 NOTE — LETTER
6/30/2021       RE: Lev Olvera  [address]  [place] [facility] [address]     Dear Colleague,    Thank you for referring your patient, Lev Olvera, to the [facility] [place] [facility]. Please see a copy of my visit note below.    Facial Plastic and Reconstructive Surgery      Lev Olvera comes in today for follow-up.  He has had past history of a septorhinoplasty in 2019.  He has noted that on the right side of the septum he felt a significant painful lesion that was under the mucosa.  He did not notice an external lesion associated with then that is why he made the appointment because of that lesion.  Has since resolved without any treatment.  He has not had any recurrence of any other type symptoms.    He does describe a little bit of decreased airflow on the right compared to left but is still significantly improved from previously.  He also describes that he has difficulty equalizing his pressure in his middle ear spaces bilaterally.  He feels like he has water in his ears.  He had his primary care look in his ears and there were no concerning findings at that point.  He states that this was present before surgery.  He just is curious about it today.  His hearing has not been affected other than when he feels muffled.    On examination he has healed well from a septorhinoplasty.  The septum is straight with good moisture of the mucosa.  On the right side where he feels some fullness of the ala is where there is a graft placed.  This graft was there purposeful for the external nasal valve collapse and helping support his ala.  I do not see any lesions of his septum or any other concerning findings.      Assessment and plan:    We will follow to see if he has recurrence of any of the symptoms he came in for.  I do not see anything on examination today.  This might have been perhaps a deep stitch with some  inflammation around it.  I did say that I could monitor for MRSA as his wife is in the medical field.    I have asked him to see a general ENT for his possible eustachian tube dysfunction.  He would also benefit from an audiogram.  I placed him on Flonase today.  Sent that prescription to pharmacy.  I have recommended he see Dr. Mark Parrish as that is right by [facility] where his wife works and this would be easier for them and coming to the [place].  They agreed.    I spent a total of 20 minutes in the care of patient Lev Olvera during today's office visit. This time includes reviewing the patient's chart and prior history, obtaining a history, performing an examination and evaluation and counseling the patient. This time also includes ordering mediations or tests necessary in addition to communication to other member's of the patient's health care team. Time spent in documentation and care coordination is included.         Again, thank you for allowing me to participate in the care of your patient.      Sincerely,    Alberta Driscoll MD

## 2021-07-27 DIAGNOSIS — H69.93 DYSFUNCTION OF BOTH EUSTACHIAN TUBES: ICD-10-CM

## 2021-07-29 RX ORDER — FLUTICASONE PROPIONATE 50 MCG
2 SPRAY, SUSPENSION (ML) NASAL DAILY
Qty: 16 ML | Refills: 10 | Status: SHIPPED | OUTPATIENT
Start: 2021-07-29 | End: 2022-05-10

## 2021-08-01 ENCOUNTER — APPOINTMENT (OUTPATIENT)
Dept: GENERAL RADIOLOGY | Facility: CLINIC | Age: 51
End: 2021-08-01
Attending: PHYSICIAN ASSISTANT
Payer: COMMERCIAL

## 2021-08-01 ENCOUNTER — HOSPITAL ENCOUNTER (EMERGENCY)
Facility: CLINIC | Age: 51
Discharge: HOME | End: 2021-08-01
Attending: PHYSICIAN ASSISTANT | Admitting: PHYSICIAN ASSISTANT
Payer: COMMERCIAL

## 2021-08-01 ENCOUNTER — APPOINTMENT (OUTPATIENT)
Dept: ULTRASOUND IMAGING | Facility: CLINIC | Age: 51
End: 2021-08-01
Attending: PHYSICIAN ASSISTANT
Payer: COMMERCIAL

## 2021-08-01 VITALS
OXYGEN SATURATION: 97 % | HEART RATE: 88 BPM | DIASTOLIC BLOOD PRESSURE: 91 MMHG | HEIGHT: 75 IN | SYSTOLIC BLOOD PRESSURE: 140 MMHG | BODY MASS INDEX: 26.36 KG/M2 | TEMPERATURE: 98.2 F | WEIGHT: 212 LBS | RESPIRATION RATE: 18 BRPM

## 2021-08-01 DIAGNOSIS — L03.115 CELLULITIS OF RIGHT LOWER LEG: ICD-10-CM

## 2021-08-01 DIAGNOSIS — M25.571 ACUTE RIGHT ANKLE PAIN: ICD-10-CM

## 2021-08-01 PROCEDURE — 73610 X-RAY EXAM OF ANKLE: CPT | Mod: RT

## 2021-08-01 PROCEDURE — 99284 EMERGENCY DEPT VISIT MOD MDM: CPT | Mod: 25

## 2021-08-01 PROCEDURE — 93971 EXTREMITY STUDY: CPT | Mod: RT

## 2021-08-01 RX ORDER — NAPROXEN 500 MG/1
500 TABLET ORAL 2 TIMES DAILY WITH MEALS
Qty: 10 TABLET | Refills: 0 | Status: SHIPPED | OUTPATIENT
Start: 2021-08-01 | End: 2021-08-06

## 2021-08-01 RX ORDER — CEPHALEXIN 500 MG/1
500 CAPSULE ORAL 4 TIMES DAILY
Qty: 28 CAPSULE | Refills: 0 | Status: SHIPPED | OUTPATIENT
Start: 2021-08-01 | End: 2021-08-08

## 2021-08-01 ASSESSMENT — ENCOUNTER SYMPTOMS
FEVER: 0
NAUSEA: 0

## 2021-08-01 ASSESSMENT — MIFFLIN-ST. JEOR: SCORE: 1907.26

## 2021-08-01 NOTE — ED PROVIDER NOTES
"  History   Chief Complaint:  Leg Pain     The history is provided by the patient.      Lev Olvera is a 51 year old male with history of hypertension and varicose veins who presents with right leg pain. Patient reports right medial ankle pain that started around 1pm today.  He iced and elevated the leg but symptoms still worsened prompting arrival to the ED. Patient denies fever and nausea. Patient also notes there is a rash on the leg which has been present intermittently for a a few months. He does not believe this rash is related to the pain. Patient denies having hardware in his foot. Patient denies a medical history of gout and recent trauma to the region. Patient denies taking any pain medications to mitigate the pain. Patient mentions wearing compression stockings everyday for varicose veins.     Review of Systems   Constitutional: Negative for fever.   Gastrointestinal: Negative for nausea.   Musculoskeletal:        (+) right ankle/heal pain   Skin: Positive for rash (right heal).   All other systems reviewed and are negative.      Allergies:  Penicillins  Trazodone Hcl    Medications:  amlodipine   lisinopril     Past Medical History:    Sleep apnea   Status post rhinoplasty  Nasal obstruction  Nasal valve collapse  Deviated nasal septum  KATHERINE  Hypertension     Past Surgical History:    Septoplasty, turbinoplasty, combined   Tooth extraction     Family History:    Hypertension  CAD    Social History:  Patient presents with family.  Patient is .    Physical Exam     Patient Vitals for the past 24 hrs:   BP Temp Temp src Pulse Resp SpO2 Height Weight   08/01/21 1915 (!) 140/91 -- -- -- -- 97 % -- --   08/01/21 1750 (!) 149/105 98.2  F (36.8  C) Oral 88 18 97 % 1.905 m (6' 3\") 96.2 kg (212 lb)       Physical Exam  Constitutional: Well appearing, no distress.   CV: 2+ DP and PT pulses, brisk distal cap refill  Pulm: No respiratory distress  MSK: Localized tenderness to the right medial ankle.  Able " to dorsiflex and plantarflex, though limited dorsiflexion secondary to pain.  There is no joint effusion or erythema to the ankle joint.  There is an 8x5cm area of erythema just proximal to the medial ankle with healed scabs as detailed below.  Neurological: Alert, attentive  5/5 strength to the DF and PF motor functions; sensation intact.   Skin: Skin is warm and dry. See MSK exam.   Psych: Normal mood and affect           Emergency Department Course     Imaging:  US Lower Extremity Venous Duplex Right  No evidence for deep venous thrombosis in the right lower  extremity veins.  As per radiology.    Ankle XR, G/E 3 views, right  No acute fracture or malalignment. There is normal joint spacing. The ankle mortise is congruent. The talar dome is unremarkable. Mild soft tissue swelling.    Emergency Department Course:    Reviewed:  I reviewed nursing notes, vitals and past medical history    Assessments:  1811 I obtained history and examined the patient as noted above.   1919 I rechecked the patient and explained findings.   1930 I rechecked the patient.  1938 I rechecked the patient.     Disposition:  The patient was discharged to home.       Impression & Plan     Medical Decision Making:  Lev Olvera is a 51 year old male presents emergency department with atraumatic right ankle pain.  Patient is afebrile and vitals within normal limits besides mildly elevated blood pressure.  On exam, he has tenderness to the right medial ankle.  There is no joint effusion or erythema.  Ultrasound of the right lower extremity negative for DVT.  X-ray without evidence of fracture or other acute abnormalities.  There is no joint effusion, erythema, or fever to suggest septic arthritis and there is no indication for joint aspiration at this time.  Considered gout, again, there is no joint effusion to suggest this, though discussed possibility of early gout and will treat with Naproxen empirically.      Patient has an area of  erythema just proximal to the ankle that comes and goes. He has recently been scratching the area and concern for secondary cellulitis. Will prescribe Keflex and area of redness outlined.  I recommended elevating leg and close follow-up with primary care provider for recheck.  If ankle pain persists he should follow-up with orthopedics.  Return to the emergency department if he develops fevers, joint swelling/redness, spreading redness, or any other concerning symptoms develop.  Patient and wife agree with this plan and all questions and concerns addressed prior to discharge home.    Diagnosis:    ICD-10-CM    1. Cellulitis of right lower leg  L03.115    2. Acute right ankle pain  M25.571        Discharge Medications:  Discharge Medication List as of 8/1/2021  7:44 PM      START taking these medications    Details   cephALEXin (KEFLEX) 500 MG capsule Take 1 capsule (500 mg) by mouth 4 times daily for 7 days, Disp-28 capsule, R-0, Local Print      naproxen (NAPROSYN) 500 MG tablet Take 1 tablet (500 mg) by mouth 2 times daily (with meals) for 5 days, Disp-10 tablet, R-0, Local Print             Scribe Disclosure:  I, Maureen Patrick, am serving as a scribe at 5:46 PM on 8/1/2021 to document services personally performed by Larissa Duncan PA-C based on my observations and the provider's statements to me.            Larissa Duncan PA-C  08/01/21 0260

## 2021-08-02 NOTE — DISCHARGE INSTRUCTIONS
Discharge Instructions  Cellulitis    Cellulitis is an infection of the skin that occurs when bacteria enter the skin.   Symptoms are generally redness, swelling, warmth and pain.  Your infection appeared to be appropriate to treat at home with antibiotics.  However, sometimes your infection may be worse than it seemed at first, or may worsen with time. If you have new or worse symptoms, you may need to be seen again in the Emergency Department or by your primary provider.    Generally, every Emergency Department visit should have a follow-up clinic visit with either a primary or a specialty clinic/provider. Please follow-up as instructed by your emergency provider today.    Return to the Emergency Department if:  The redness, pain, or swelling gets a lot worse.  If the red area was marked, return if it is red significantly beyond the marked area.  You are unable to get your antibiotics, or are vomiting (throwing up) these pills, or you cannot take them.  You are feeling more ill, weak or lightheaded.  You start to run a new fever (temperature >101 F).  Anything else about the infection worries or concerns you.  Treatment:  Start your antibiotics right away, and take them as prescribed. Be sure to finish the whole prescription, even if you are better.  Apply a heating pad, warm packs, or warm water soaks to the infected area for 15 minutes at a time, at least 3 times a day. Do not use a heating pad on your feet or legs if you have diabetes. Do not sleep with a heating pad on, since this can cause burns or skin injury.  Rest your injured area for at least 1-2 days. After that you may start using your extremity again as long as there is not too much pain.   Raise the injured area above the level of your heart as much as possible in the first 1-2 days.  Tylenol  (acetaminophen), Motrin  (ibuprofen), or Advil  (ibuprofen) may help may help reduce pain and fever and may help you feel more comfortable. Be sure to read  and follow the package directions, and ask your provider if you have questions.    If you were given a prescription for medicine here today, be sure to read all of the information (including the package insert) that comes with your prescription.  This will include important information about the medicine, its side effects, and any warnings that you need to know about.  The pharmacist who fills the prescription can provide more information and answer questions you may have about the medicine.  If you have questions or concerns that the pharmacist cannot address, please call or return to the Emergency Department.     Remember that you can always come back to the Emergency Department if you are not able to see your regular provider in the amount of time listed above, if you get any new symptoms, or if there is anything that worries you.

## 2021-10-24 ENCOUNTER — HEALTH MAINTENANCE LETTER (OUTPATIENT)
Age: 51
End: 2021-10-24

## 2022-02-13 ENCOUNTER — HEALTH MAINTENANCE LETTER (OUTPATIENT)
Age: 52
End: 2022-02-13

## 2022-03-24 ENCOUNTER — TELEPHONE (OUTPATIENT)
Dept: INTERNAL MEDICINE | Facility: CLINIC | Age: 52
End: 2022-03-24
Payer: COMMERCIAL

## 2022-03-24 NOTE — LETTER
[facility]  [address] [address]  [phone]      4/7/2022 April 7, 2022      Lev Olvera  [address]  [facility] [id number]      Your healthcare team cares about your health. To provide you with the best care,   we have reviewed your chart and based on our findings, we see that you are due to:     - COLON CANCER SCREENING:  Call or mychart the clinic to schedule your colonoscopy or schedule/ your FIT Test, or Cologuard test  - OTHER FOLLOW UP:  Physical    If you have already completed these items, please contact the clinic via phone or   Mychart so your care team can review and update your records. Thank you for   choosing [facility] for your healthcare needs. For any questions,   concerns, or to schedule an appointment please contact the clinic.       Healthy Regards,      Your [facility] Care Team

## 2022-03-24 NOTE — TELEPHONE ENCOUNTER
Patient Quality Outreach      Summary:    Patient has the following on his problem list/HM: None  Patient Active Problem List   Diagnosis     Benign essential hypertension     KATHERINE (obstructive sleep apnea)     Nasal obstruction     Nasal valve collapse     Deviated nasal septum     Status post rhinoplasty         Colon Cancer Screening -  Colonoscopy  Physical  - Due after 3/2006    Type of outreach:    Sent "[employer]" message.    Questions for provider review:    None                                                                                                                                     Denise Linton, CMA

## 2022-05-09 DIAGNOSIS — H69.93 DYSFUNCTION OF BOTH EUSTACHIAN TUBES: ICD-10-CM

## 2022-05-10 NOTE — TELEPHONE ENCOUNTER
FLUTICASONE PROP 50 MCG SPRAY      Last Written Prescription Date:  7/29/21  Last Fill Quantity: 16 ml,   # refills: 10  Last Office Visit : 6/30/21  Future Office visit:  None scheduled    Routing refill request to provider for review/approval because:  Last dictation:  I have asked him to see a general ENT for his possible eustachian tube dysfunction.  He would also benefit from an audiogram.  I placed him on Flonase today.  Sent that prescription to pharmacy.  I have recommended he see Dr. Mark Parrish as that is [facility] where his wife [facility] and this would be easier for them and coming to the [place].  They agreed.    Continue to refill or defer?

## 2022-05-11 RX ORDER — FLUTICASONE PROPIONATE 50 MCG
2 SPRAY, SUSPENSION (ML) NASAL DAILY
Qty: 16 ML | Refills: 3 | Status: SHIPPED | OUTPATIENT
Start: 2022-05-11 | End: 2023-07-31

## 2022-05-31 DIAGNOSIS — I10 BENIGN ESSENTIAL HYPERTENSION: ICD-10-CM

## 2022-06-01 NOTE — TELEPHONE ENCOUNTER
Routing refill request to provider for review/approval because:  Patient needs to be seen because it has been more than 1 year since last office visit.  BP Readings from Last 3 Encounters:   08/01/21 (!) 140/91   04/08/21 102/60   02/28/20 116/71     Creatinine   Date Value Ref Range Status   03/13/2019 1.16 0.66 - 1.25 mg/dL Final           Jeffrey Lorenzana RN  [facility] Triage Nurse

## 2022-06-02 RX ORDER — AMLODIPINE BESYLATE 5 MG/1
TABLET ORAL
Qty: 90 TABLET | Refills: 0 | Status: SHIPPED | OUTPATIENT
Start: 2022-06-02 | End: 2022-07-28

## 2022-07-28 DIAGNOSIS — I10 BENIGN ESSENTIAL HYPERTENSION: ICD-10-CM

## 2022-07-28 RX ORDER — AMLODIPINE BESYLATE 5 MG/1
TABLET ORAL
Qty: 90 TABLET | Refills: 0 | Status: SHIPPED | OUTPATIENT
Start: 2022-07-28 | End: 2022-11-28

## 2022-07-28 RX ORDER — LISINOPRIL 20 MG/1
TABLET ORAL
Qty: 90 TABLET | Refills: 3 | Status: SHIPPED | OUTPATIENT
Start: 2022-07-28 | End: 2023-07-26

## 2022-07-28 NOTE — TELEPHONE ENCOUNTER
Routing refill request to provider for review/approval because:  Labs not current:    Creatinine   Date Value Ref Range Status   03/13/2019 1.16 0.66 - 1.25 mg/dL Final     Potassium   Date Value Ref Range Status   03/13/2019 4.4 3.4 - 5.3 mmol/L Final     Creatinine   Date Value Ref Range Status   03/13/2019 1.16 0.66 - 1.25 mg/dL Final       Patient needs to be seen because it has been more than 1 year since last office visit.  BP out of range   BP Readings from Last 3 Encounters:   08/01/21 (!) 140/91   04/08/21 102/60   02/28/20 116/71          done

## 2022-10-16 ENCOUNTER — HEALTH MAINTENANCE LETTER (OUTPATIENT)
Age: 52
End: 2022-10-16

## 2022-11-25 DIAGNOSIS — I10 BENIGN ESSENTIAL HYPERTENSION: ICD-10-CM

## 2022-11-28 RX ORDER — AMLODIPINE BESYLATE 5 MG/1
TABLET ORAL
Qty: 90 TABLET | Refills: 0 | Status: SHIPPED | OUTPATIENT
Start: 2022-11-28 | End: 2023-03-07

## 2022-11-28 NOTE — TELEPHONE ENCOUNTER
Routing refill request to provider for review/approval because:  Labs not current:    Creatinine   Date Value Ref Range Status   03/13/2019 1.16 0.66 - 1.25 mg/dL Final     Patient needs to be seen because it has been more than 1 year since last office visit. Nothing upcoming scheduled at this time.   BP Readings from Last 3 Encounters:   08/01/21 (!) 140/91   04/08/21 102/60   02/28/20 116/71     Kylah MORALES RN  [facility]

## 2023-03-06 DIAGNOSIS — I10 BENIGN ESSENTIAL HYPERTENSION: ICD-10-CM

## 2023-03-07 RX ORDER — AMLODIPINE BESYLATE 5 MG/1
TABLET ORAL
Qty: 90 TABLET | Refills: 0 | Status: SHIPPED | OUTPATIENT
Start: 2023-03-07 | End: 2023-06-06

## 2023-03-26 ENCOUNTER — HEALTH MAINTENANCE LETTER (OUTPATIENT)
Age: 53
End: 2023-03-26

## 2023-05-11 ENCOUNTER — OFFICE VISIT (OUTPATIENT)
Dept: FAMILY MEDICINE | Facility: CLINIC | Age: 53
End: 2023-05-11
Payer: COMMERCIAL

## 2023-05-11 VITALS
TEMPERATURE: 97.7 F | BODY MASS INDEX: 29.35 KG/M2 | HEART RATE: 80 BPM | DIASTOLIC BLOOD PRESSURE: 65 MMHG | OXYGEN SATURATION: 99 % | SYSTOLIC BLOOD PRESSURE: 100 MMHG | WEIGHT: 241 LBS | RESPIRATION RATE: 15 BRPM | HEIGHT: 76 IN

## 2023-05-11 DIAGNOSIS — R21 RASH: Primary | ICD-10-CM

## 2023-05-11 PROCEDURE — 99213 OFFICE O/P EST LOW 20 MIN: CPT | Performed by: NURSE PRACTITIONER

## 2023-05-11 RX ORDER — PREDNISONE 20 MG/1
20 TABLET ORAL DAILY
Qty: 10 TABLET | Refills: 0 | Status: SHIPPED | OUTPATIENT
Start: 2023-05-11 | End: 2023-07-31

## 2023-05-11 RX ORDER — MUPIROCIN 20 MG/G
OINTMENT TOPICAL 3 TIMES DAILY
Qty: 15 G | Refills: 0 | Status: SHIPPED | OUTPATIENT
Start: 2023-05-11 | End: 2023-07-31

## 2023-05-11 RX ORDER — TRIAMCINOLONE ACETONIDE 1 MG/G
OINTMENT TOPICAL
COMMUNITY
Start: 2023-04-06 | End: 2023-07-31

## 2023-05-11 ASSESSMENT — PAIN SCALES - GENERAL: PAINLEVEL: NO PAIN (0)

## 2023-05-11 NOTE — PROGRESS NOTES
"  Assessment & Plan     Rash  Does not appear to be infectious/cellulitis or herpetic.  He has had mild improvement with topical steroids but concern about continuing so close to his eye.  We will do short course of oral steroids and ad mupirocin.  Also recommended oral anti-histamine as it could certainly be secondary to environmental irritants.  If symptoms worsen or persist, will refer to derm for further evaluation.    - mupirocin (BACTROBAN) 2 % external ointment; Apply topically 3 times daily Around eye  - predniSONE (DELTASONE) 20 MG tablet; Take 1 tablet (20 mg) by mouth daily    Prescription drug management         BMI:   Estimated body mass index is 29.73 kg/m  as calculated from the following:    Height as of this encounter: 1.918 m (6' 3.5\").    Weight as of this encounter: 109.3 kg (241 lb).           ADRI Yañez CNP  [facility]    Eddie Ohara is a 53 year old, presenting for the following health issues:  Derm Problem        5/11/2023     1:08 PM   Additional Questions   Roomed by serina nava   Accompanied by none         5/11/2023     1:08 PM   Patient Reported Additional Medications   Patient reports taking the following new medications none oitment from eye doctor     History of Present Illness       Reason for visit:  Rash around eye  Symptom onset:  More than a month  Symptoms include:  Rash that doesn't go away  Symptom intensity:  Mild  Symptom progression:  Staying the same  Had these symptoms before:  No  What makes it better:  No    He eats 2-3 servings of fruits and vegetables daily.He consumes 1 sweetened beverage(s) daily.He exercises with enough effort to increase his heart rate 10 to 19 minutes per day.  He exercises with enough effort to increase his heart rate 3 or less days per week. He is missing 1 dose(s) of medications per week.  He is not taking prescribed medications regularly due to remembering to take.       Not itchy or painful. Has " "slightly improved with the steroid topical prescribed by his eye doctor.  Wondering if he could have been exposed to something by his dog?  No drainage or open wounds.        Review of Systems   Constitutional, HEENT, cardiovascular, pulmonary, gi and gu systems are negative, except as otherwise noted.      Objective    /65 (BP Location: Right arm, Patient Position: Chair, Cuff Size: Adult Large)   Pulse 80   Temp 97.7  F (36.5  C) (Temporal)   Resp 15   Ht 1.918 m (6' 3.5\")   Wt 109.3 kg (241 lb)   SpO2 99%   BMI 29.73 kg/m    Body mass index is 29.73 kg/m .  Physical Exam   GENERAL: healthy, alert and no distress  HENT: normal cephalic/atraumatic and patchy red macular right maryanne-orbital rash, no drainage or swelling noted.                      "

## 2023-06-05 DIAGNOSIS — I10 BENIGN ESSENTIAL HYPERTENSION: ICD-10-CM

## 2023-06-06 RX ORDER — AMLODIPINE BESYLATE 5 MG/1
TABLET ORAL
Qty: 90 TABLET | Refills: 0 | Status: SHIPPED | OUTPATIENT
Start: 2023-06-06 | End: 2023-09-21

## 2023-07-26 DIAGNOSIS — I10 BENIGN ESSENTIAL HYPERTENSION: ICD-10-CM

## 2023-07-26 RX ORDER — LISINOPRIL 20 MG/1
TABLET ORAL
Qty: 90 TABLET | Refills: 3 | Status: SHIPPED | OUTPATIENT
Start: 2023-07-26 | End: 2024-08-05

## 2023-07-31 ENCOUNTER — APPOINTMENT (OUTPATIENT)
Dept: MRI IMAGING | Facility: CLINIC | Age: 53
DRG: 064 | End: 2023-07-31
Attending: EMERGENCY MEDICINE
Payer: COMMERCIAL

## 2023-07-31 ENCOUNTER — HOSPITAL ENCOUNTER (INPATIENT)
Facility: CLINIC | Age: 53
LOS: 2 days | Discharge: HOME | DRG: 064 | End: 2023-08-02
Attending: EMERGENCY MEDICINE | Admitting: HOSPITALIST
Payer: COMMERCIAL

## 2023-07-31 DIAGNOSIS — I63.50 POSTERIOR CIRCULATION STROKE (H): ICD-10-CM

## 2023-07-31 DIAGNOSIS — I77.74 VERTEBRAL ARTERY DISSECTION (H): ICD-10-CM

## 2023-07-31 LAB
ANION GAP SERPL CALCULATED.3IONS-SCNC: 12 MMOL/L (ref 7–15)
ATRIAL RATE - MUSE: 65 BPM
BASOPHILS # BLD AUTO: 0 10E3/UL (ref 0–0.2)
BASOPHILS NFR BLD AUTO: 1 %
BUN SERPL-MCNC: 12.6 MG/DL (ref 6–20)
CALCIUM SERPL-MCNC: 9.8 MG/DL (ref 8.6–10)
CHLORIDE SERPL-SCNC: 102 MMOL/L (ref 98–107)
CHOLEST SERPL-MCNC: 287 MG/DL
CREAT SERPL-MCNC: 1.09 MG/DL (ref 0.67–1.17)
DEPRECATED HCO3 PLAS-SCNC: 25 MMOL/L (ref 22–29)
DIASTOLIC BLOOD PRESSURE - MUSE: NORMAL MMHG
EOSINOPHIL # BLD AUTO: 0.1 10E3/UL (ref 0–0.7)
EOSINOPHIL NFR BLD AUTO: 2 %
ERYTHROCYTE [DISTWIDTH] IN BLOOD BY AUTOMATED COUNT: 12.7 % (ref 10–15)
GFR SERPL CREATININE-BSD FRML MDRD: 82 ML/MIN/1.73M2
GLUCOSE BLDC GLUCOMTR-MCNC: 176 MG/DL (ref 70–99)
GLUCOSE SERPL-MCNC: 107 MG/DL (ref 70–99)
HBA1C MFR BLD: 5.5 %
HCT VFR BLD AUTO: 47.9 % (ref 40–53)
HDLC SERPL-MCNC: 60 MG/DL
HGB BLD-MCNC: 16.1 G/DL (ref 13.3–17.7)
HOLD SPECIMEN: NORMAL
IMM GRANULOCYTES # BLD: 0 10E3/UL
IMM GRANULOCYTES NFR BLD: 0 %
INTERPRETATION ECG - MUSE: NORMAL
LDLC SERPL CALC-MCNC: 201 MG/DL
LYMPHOCYTES # BLD AUTO: 1.8 10E3/UL (ref 0.8–5.3)
LYMPHOCYTES NFR BLD AUTO: 29 %
MCH RBC QN AUTO: 31.1 PG (ref 26.5–33)
MCHC RBC AUTO-ENTMCNC: 33.6 G/DL (ref 31.5–36.5)
MCV RBC AUTO: 93 FL (ref 78–100)
MONOCYTES # BLD AUTO: 0.4 10E3/UL (ref 0–1.3)
MONOCYTES NFR BLD AUTO: 7 %
NEUTROPHILS # BLD AUTO: 3.8 10E3/UL (ref 1.6–8.3)
NEUTROPHILS NFR BLD AUTO: 61 %
NONHDLC SERPL-MCNC: 227 MG/DL
NRBC # BLD AUTO: 0 10E3/UL
NRBC BLD AUTO-RTO: 0 /100
P AXIS - MUSE: 54 DEGREES
PLATELET # BLD AUTO: 228 10E3/UL (ref 150–450)
POTASSIUM SERPL-SCNC: 4.3 MMOL/L (ref 3.4–5.3)
PR INTERVAL - MUSE: 134 MS
QRS DURATION - MUSE: 94 MS
QT - MUSE: 378 MS
QTC - MUSE: 393 MS
R AXIS - MUSE: 3 DEGREES
RADIOLOGIST FLAGS: ABNORMAL
RBC # BLD AUTO: 5.18 10E6/UL (ref 4.4–5.9)
SODIUM SERPL-SCNC: 139 MMOL/L (ref 136–145)
SYSTOLIC BLOOD PRESSURE - MUSE: NORMAL MMHG
T AXIS - MUSE: 37 DEGREES
TRIGL SERPL-MCNC: 128 MG/DL
TROPONIN T SERPL HS-MCNC: 7 NG/L
TROPONIN T SERPL HS-MCNC: 7 NG/L
VENTRICULAR RATE- MUSE: 65 BPM
WBC # BLD AUTO: 6.2 10E3/UL (ref 4–11)

## 2023-07-31 PROCEDURE — 84484 ASSAY OF TROPONIN QUANT: CPT | Performed by: HOSPITALIST

## 2023-07-31 PROCEDURE — 36415 COLL VENOUS BLD VENIPUNCTURE: CPT | Performed by: EMERGENCY MEDICINE

## 2023-07-31 PROCEDURE — A9585 GADOBUTROL INJECTION: HCPCS | Performed by: EMERGENCY MEDICINE

## 2023-07-31 PROCEDURE — 85025 COMPLETE CBC W/AUTO DIFF WBC: CPT | Performed by: EMERGENCY MEDICINE

## 2023-07-31 PROCEDURE — 83036 HEMOGLOBIN GLYCOSYLATED A1C: CPT | Performed by: HOSPITALIST

## 2023-07-31 PROCEDURE — 70544 MR ANGIOGRAPHY HEAD W/O DYE: CPT

## 2023-07-31 PROCEDURE — 80048 BASIC METABOLIC PNL TOTAL CA: CPT | Performed by: EMERGENCY MEDICINE

## 2023-07-31 PROCEDURE — 80061 LIPID PANEL: CPT | Performed by: HOSPITALIST

## 2023-07-31 PROCEDURE — 99285 EMERGENCY DEPT VISIT HI MDM: CPT | Mod: 25

## 2023-07-31 PROCEDURE — 99223 1ST HOSP IP/OBS HIGH 75: CPT | Performed by: HOSPITALIST

## 2023-07-31 PROCEDURE — 255N000002 HC RX 255 OP 636: Performed by: EMERGENCY MEDICINE

## 2023-07-31 PROCEDURE — 70553 MRI BRAIN STEM W/O & W/DYE: CPT

## 2023-07-31 PROCEDURE — 36415 COLL VENOUS BLD VENIPUNCTURE: CPT | Performed by: HOSPITALIST

## 2023-07-31 PROCEDURE — 70549 MR ANGIOGRAPH NECK W/O&W/DYE: CPT

## 2023-07-31 PROCEDURE — 93005 ELECTROCARDIOGRAM TRACING: CPT

## 2023-07-31 PROCEDURE — 120N000001 HC R&B MED SURG/OB

## 2023-07-31 PROCEDURE — 250N000013 HC RX MED GY IP 250 OP 250 PS 637: Performed by: HOSPITALIST

## 2023-07-31 PROCEDURE — 250N000013 HC RX MED GY IP 250 OP 250 PS 637: Performed by: EMERGENCY MEDICINE

## 2023-07-31 PROCEDURE — 99253 IP/OBS CNSLTJ NEW/EST LOW 45: CPT | Mod: GC | Performed by: PSYCHIATRY & NEUROLOGY

## 2023-07-31 RX ORDER — HYDRALAZINE HYDROCHLORIDE 20 MG/ML
10-20 INJECTION INTRAMUSCULAR; INTRAVENOUS
Status: DISCONTINUED | OUTPATIENT
Start: 2023-07-31 | End: 2023-08-02 | Stop reason: HOSPADM

## 2023-07-31 RX ORDER — FAMOTIDINE 20 MG/1
20 TABLET, FILM COATED ORAL 2 TIMES DAILY
Status: DISCONTINUED | OUTPATIENT
Start: 2023-07-31 | End: 2023-08-02 | Stop reason: HOSPADM

## 2023-07-31 RX ORDER — ONDANSETRON 2 MG/ML
4 INJECTION INTRAMUSCULAR; INTRAVENOUS EVERY 6 HOURS PRN
Status: DISCONTINUED | OUTPATIENT
Start: 2023-07-31 | End: 2023-08-02 | Stop reason: HOSPADM

## 2023-07-31 RX ORDER — LIDOCAINE 40 MG/G
CREAM TOPICAL
Status: DISCONTINUED | OUTPATIENT
Start: 2023-07-31 | End: 2023-08-02 | Stop reason: HOSPADM

## 2023-07-31 RX ORDER — ATORVASTATIN CALCIUM 40 MG/1
40 TABLET, FILM COATED ORAL EVERY EVENING
Status: DISCONTINUED | OUTPATIENT
Start: 2023-07-31 | End: 2023-08-01

## 2023-07-31 RX ORDER — PREDNISONE 20 MG/1
20 TABLET ORAL DAILY
Status: DISCONTINUED | OUTPATIENT
Start: 2023-07-31 | End: 2023-07-31

## 2023-07-31 RX ORDER — ASPIRIN 81 MG/1
81 TABLET, CHEWABLE ORAL ONCE
Status: COMPLETED | OUTPATIENT
Start: 2023-07-31 | End: 2023-07-31

## 2023-07-31 RX ORDER — ONDANSETRON 4 MG/1
4 TABLET, ORALLY DISINTEGRATING ORAL EVERY 6 HOURS PRN
Status: DISCONTINUED | OUTPATIENT
Start: 2023-07-31 | End: 2023-08-02 | Stop reason: HOSPADM

## 2023-07-31 RX ORDER — ASPIRIN 81 MG/1
81 TABLET ORAL DAILY
Status: DISCONTINUED | OUTPATIENT
Start: 2023-08-01 | End: 2023-08-02 | Stop reason: HOSPADM

## 2023-07-31 RX ORDER — ASPIRIN 81 MG/1
81 TABLET, CHEWABLE ORAL DAILY
Status: DISCONTINUED | OUTPATIENT
Start: 2023-08-01 | End: 2023-08-02 | Stop reason: HOSPADM

## 2023-07-31 RX ORDER — LABETALOL HYDROCHLORIDE 5 MG/ML
10-20 INJECTION, SOLUTION INTRAVENOUS EVERY 10 MIN PRN
Status: DISCONTINUED | OUTPATIENT
Start: 2023-07-31 | End: 2023-08-02 | Stop reason: HOSPADM

## 2023-07-31 RX ORDER — GADOBUTROL 604.72 MG/ML
10 INJECTION INTRAVENOUS ONCE
Status: COMPLETED | OUTPATIENT
Start: 2023-07-31 | End: 2023-07-31

## 2023-07-31 RX ORDER — AMOXICILLIN 250 MG
1-2 CAPSULE ORAL 2 TIMES DAILY
Status: DISCONTINUED | OUTPATIENT
Start: 2023-07-31 | End: 2023-08-02 | Stop reason: HOSPADM

## 2023-07-31 RX ADMIN — GADOBUTROL 10 ML: 604.72 INJECTION INTRAVENOUS at 15:18

## 2023-07-31 RX ADMIN — ATORVASTATIN CALCIUM 40 MG: 40 TABLET, FILM COATED ORAL at 21:17

## 2023-07-31 RX ADMIN — FAMOTIDINE 20 MG: 20 TABLET, FILM COATED ORAL at 22:14

## 2023-07-31 RX ADMIN — ASPIRIN 81 MG 81 MG: 81 TABLET ORAL at 17:15

## 2023-07-31 ASSESSMENT — ACTIVITIES OF DAILY LIVING (ADL)
WALKING_OR_CLIMBING_STAIRS_DIFFICULTY: NO
TRANSFERRING: 0-->INDEPENDENT
ADLS_ACUITY_SCORE: 20
DOING_ERRANDS_INDEPENDENTLY_DIFFICULTY: YES
ADLS_ACUITY_SCORE: 20
CHANGE_IN_FUNCTIONAL_STATUS_SINCE_ONSET_OF_CURRENT_ILLNESS/INJURY: NO
DRESSING/BATHING_DIFFICULTY: NO
VISION_MANAGEMENT: GLASSES
FALL_HISTORY_WITHIN_LAST_SIX_MONTHS: NO
ADLS_ACUITY_SCORE: 35
WEAR_GLASSES_OR_BLIND: YES
DIFFICULTY_EATING/SWALLOWING: NO
ADLS_ACUITY_SCORE: 35
ADLS_ACUITY_SCORE: 35
TRANSFERRING: 0-->INDEPENDENT
ADLS_ACUITY_SCORE: 35
TOILETING_ISSUES: NO
CONCENTRATING,_REMEMBERING_OR_MAKING_DECISIONS_DIFFICULTY: NO

## 2023-07-31 NOTE — ED NOTES
[facility]  ED Nurse Handoff Report    ED Chief complaint: Dizziness      ED Diagnosis:   Final diagnoses:   Posterior circulation stroke (H)   Vertebral artery dissection (H)       Code Status: Full Code    Allergies:   Allergies   Allergen Reactions    Penicillins Hives    Trazodone Hcl      dizzy       Patient Story: Pt comes to ED for evaluation of dizziness, HA, and nausea.   Focused Assessment:  Symptoms started Saturday for pt. He stated that he had thought he wasn't feeling well due to consuming some alcohol the previous day. Pt Continued with HA, nausea and dizziness. After the third day of continued symptoms pt came to ED for evaluation of symptoms.Pt PMH glaucoma and sleep apnea.  Results for orders placed or performed during the hospital encounter of 07/31/23   MR Brain w/o & w Contrast     Status: Abnormal   Result Value Ref Range    Radiologist flags Large acute versus early subacute left PICA (AA)     Narrative    EXAM: MR BRAIN W/O & W CONTRAST  7/31/2023 3:44 PM     HISTORY: Headache, posterior neck pain, vertigo       COMPARISON: None.    TECHNIQUE: Multiplanar, multisequence MR imaging of the head obtained  prior to, and after, intravenous contrast administration    CONTRAST: 10 mL Gadavist.    FINDINGS:    Calvarium/skull base: No focal marrow replacing lesion. Small  bilateral mastoid effusions, nonspecific.    Orbits: Within normal limits accounting for technique.    Paranasal sinuses: No substantial paranasal sinus disease.    Brain: Large confluent area of restricted diffusion centered within  the inferior aspect of the left cerebellar hemisphere with involvement  of the cerebellar tonsil. An additional tiny focus of restricted  diffusion is seen along the left dorsolateral aspect of the medulla  (series 5/image 51). Moderate edema signal associated with the  infarcted tissue exerts slight mass effect on the fourth ventricle. No  evidence of hydrocephalus. No evidence of  acute intracranial  hemorrhage. Basal cisterns are patent. Normal positioning and  morphology of the cerebellar tonsils. Small remote lacunar infarct  versus dilated perivascular space in the right basal ganglia. No  substantial background of white matter disease for patient's age.  Normal flow related signal within the major intracranial venous  structures. Brain MRA is reported separately. No abnormal enhancing  intracranial lesions.      Impression    IMPRESSION:    1.  Large acute versus early subacute infarct involving the left PICA  territory as described above. Moderate edema-like signal is seen  associated with the infarcted tissue resulting in slight mass effect  on the fourth ventricle. No evidence of hydrocephalus at this time.  2.  No evidence of hemorrhagic conversion.  3.  Small remote lacunar infarct versus dilated perivascular space in  the right basal ganglia.  4.  Head and neck MRA reported separately.    [Critical Result: Large acute versus early subacute left PICA  territory infarct.]    Finding was identified on 7/31/2023 3:25 PM.     Dr. Artis was contacted by Dr. Bishop on 7/31/2023 at 3:30 PM  and verbalized understanding of the critical result.     PAIGE BISHOP MD         SYSTEM ID:  [id number]   MRA Brain ([place]) wo Contrast     Status: Abnormal   Result Value Ref Range    Radiologist flags Absence of normal flow-related signal within the (AA)     Narrative    EXAM MRA BRAIN ([place]) W/O CONTRAST 7/31/2023 3:02 PM    HISTORY: Headache, posterior neck pain, vertigo    COMPARISON: None.    TECHNIQUE: Using a 3D time-of-flight image acquisition technique, MRA  of the major arteries at the base of the brain was obtained without  intravenous contrast. Three-dimensional reconstructions of the head  MRA were created, which were reviewed by the radiologist.    FINDINGS:     Anterior circulation: No high-grade stenosis or occlusion. No aneurysm  or evidence of high flow  vascular malformation.    Posterior circulation: Absence of normal flow-related signal within  the left vertebral artery intradural segment, likely related to  dissection in correlation with findings on contemporaneous neck MRA.  Normal flow-related signal within the right vertebral artery  intradural segment and basilar artery. No aneurysm. Right fetal like  PCA.      Impression    IMPRESSION:    1.  Absence of normal flow-related signal within the left vertebral  artery intradural segment, likely related to dissection in correlation  with contemporaneous neck MRA.  2.  Unremarkable MRA of the anterior circulation.    [Critical Result: Absence of normal flow-related signal within the  left vertebral artery intradural segment.]    Finding was identified on 7/31/2023 3:25 PM.     Dr. Artis was contacted by Dr. Bishop on 7/31/2023 at 3:30 PM  and verbalized understanding of the critical result.     PAIGE BISHOP MD         SYSTEM ID:  [id number]   MRA Neck (Carotids) wo & w Contrast     Status: Abnormal   Result Value Ref Range    Radiologist flags (AA)      Suspicion for left vertebral artery dissection with    Narrative    EXAM: MRA NECK (CAROTIDS) W/O & W CONTRAST  7/31/2023 3:45 PM     HISTORY: Headache, posterior neck pain, vertigo       COMPARISON: None.    TECHNIQUE: Neck MRA: Limited non contrast 2DTOF images were obtained  of the mid-cervical region. Following intravenous gadolinium-based  contrast administration, a contrast enhanced MRA of the neck/cervical  vessels was performed.  Three-dimensional reconstructions of the neck and head MRA were  created, which were reviewed by the radiologist.    CONTRAST: 10 mL Gadavist.    FINDINGS:    Aortic arch: Normal configuration. No high-grade stenosis or occlusion  involving the origins of the major branch vessels.    Carotid arteries: No substantial stenosis or occlusion. No evidence of  dissection.    Vertebral arteries: The left vertebral artery is  faintly opacified at  its origin and through the V1 segment. However, the left vertebral  artery V2 segment becomes nonopacified with asymmetric areas of  intrinsically T1 hyperintense signal abnormality associated with the  vessel wall (for example series 10/images 23, 27, and 34). The left  vertebral artery remains non-opacified through the V3 and V4 segments  on 2D time-of-flight sequence. Faint opacification of the left V4  segment on post contrast sequence is likely from retrograde filling.  No high-grade stenosis, occlusion, or evidence of dissection involving  the right vertebral artery.    No focal extraspinal abnormality identified on limited evaluation.      Impression    IMPRESSION:     1.  Constellation of findings suspicious for left vertebral artery  dissection as described above. Catheter directed angiography could  further evaluate if clinically indicated.  2.  Unremarkable MRA appearance of the right vertebral artery and  carotid arteries.    [Critical Result: Suspicion for left vertebral artery dissection with  recent left PICA infarct.]    Finding was identified on 7/31/2023 3:25 PM.     Dr. Artis was contacted by Dr. Bishop on 7/31/2023 at 3:30 PM  and verbalized understanding of the critical result.     PAIGE BISHOP MD         SYSTEM ID:  [id number]   Basic metabolic panel     Status: Abnormal   Result Value Ref Range    Sodium 139 136 - 145 mmol/L    Potassium 4.3 3.4 - 5.3 mmol/L    Chloride 102 98 - 107 mmol/L    Carbon Dioxide (CO2) 25 22 - 29 mmol/L    Anion Gap 12 7 - 15 mmol/L    Urea Nitrogen 12.6 6.0 - 20.0 mg/dL    Creatinine 1.09 0.67 - 1.17 mg/dL    Calcium 9.8 8.6 - 10.0 mg/dL    Glucose 107 (H) 70 - 99 mg/dL    GFR Estimate 82 >60 mL/min/1.73m2   Extra Tube     Status: None ()    Narrative    The following orders were created for panel order Extra Tube.  Procedure                               Abnormality         Status                     ---------                                -----------         ------                     Extra Red Top Tube[[id number]]                                                          Extra Green Top (Lithium...[[id number]]                                                 Extra Purple Top Tube[[id number]]                                                         Please view results for these tests on the individual orders.   CBC with platelets and differential     Status: None   Result Value Ref Range    WBC Count 6.2 4.0 - 11.0 10e3/uL    RBC Count 5.18 4.40 - 5.90 10e6/uL    Hemoglobin 16.1 13.3 - 17.7 g/dL    Hematocrit 47.9 40.0 - 53.0 %    MCV 93 78 - 100 fL    MCH 31.1 26.5 - 33.0 pg    MCHC 33.6 31.5 - 36.5 g/dL    RDW 12.7 10.0 - 15.0 %    Platelet Count 228 150 - 450 10e3/uL    % Neutrophils 61 %    % Lymphocytes 29 %    % Monocytes 7 %    % Eosinophils 2 %    % Basophils 1 %    % Immature Granulocytes 0 %    NRBCs per 100 WBC 0 <1 /100    Absolute Neutrophils 3.8 1.6 - 8.3 10e3/uL    Absolute Lymphocytes 1.8 0.8 - 5.3 10e3/uL    Absolute Monocytes 0.4 0.0 - 1.3 10e3/uL    Absolute Eosinophils 0.1 0.0 - 0.7 10e3/uL    Absolute Basophils 0.0 0.0 - 0.2 10e3/uL    Absolute Immature Granulocytes 0.0 <=0.4 10e3/uL    Absolute NRBCs 0.0 10e3/uL   EKG 12 lead     Status: None   Result Value Ref Range    Systolic Blood Pressure  mmHg    Diastolic Blood Pressure  mmHg    Ventricular Rate 65 BPM    Atrial Rate 65 BPM    WY Interval 134 ms    QRS Duration 94 ms     ms    QTc 393 ms    P Axis 54 degrees    R AXIS 3 degrees    T Axis 37 degrees    Interpretation ECG       Sinus rhythm  Minimal voltage criteria for LVH, may be normal variant ( R in aVL )  Borderline ECG  When compared with ECG of 11-JAN-2019 19:25,  No significant change was found  Confirmed by GENERATED REPORT, COMPUTER ([id number]),  Tammie Pettit ([id number]) on 7/31/2023 2:40:02 PM     CBC with platelets + differential     Status: None    Narrative    The following orders were created for panel order  CBC with platelets + differential.  Procedure                               Abnormality         Status                     ---------                               -----------         ------                     CBC with platelets and d...[[id number]]                      Final result                 Please view results for these tests on the individual orders.         Treatments and/or interventions provided: Asa      Does this patient have any cognitive concerns?:  Baseline independent    Activity level - Baseline/Home:  Independent  Activity Level - Current:   Independent    Patient's Preferred language: English   Needed?: No    Isolation: None  Infection: Not Applicable  Patient tested for COVID 19 prior to admission: NO  Bariatric?: No    Vital Signs:   Vitals:    07/31/23 1229 07/31/23 1540 07/31/23 1648 07/31/23 1700   BP: (!) 140/87 (!) 136/91 (!) 140/95 (!) 144/93   Pulse: 77 71 74 75   Resp: 16      Temp: 97.8  F (36.6  C)      TempSrc: Oral      SpO2: 100%          Cardiac Rhythm:     Was the PSS-3 completed:   Yes  What interventions are required if any?               Family Comments: Wife at bedside  OBS brochure/video discussed/provided to patient/family: N/A              Name of person given brochure if not patient: N/A              Relationship to patient: N/A    For the majority of the shift this patient's behavior was Green.   Behavioral interventions performed were N/A.    ED NURSE PHONE NUMBER: [phone]

## 2023-07-31 NOTE — ED NOTES
Tele-PIT/Intake Evaluation      Video-Visit Details    Type of service:  Video Visit    Video Start Time (time video started): 12:32 PM  Video End Time (time video stopped): 12:36 PM   Originating Location (pt. Location):  [facility]  Distant Location (provider location):  [facility]  Mode of Communication:  Video Conference via iLike  Patient verbally consented to [email or web address] televisit.    History:  This is a pleasant 53-year-old male who presents for evaluation of vertigo.  Symptoms began Saturday and felt positional and benign, however today he has had headache, posterior neck pain, and spoke to his wife who is in healthcare at the [place].    Exam:    Patient Vitals for the past 24 hrs:   BP Temp Temp src Pulse Resp SpO2   07/31/23 1229 (!) 140/87 97.8  F (36.6  C) Oral 77 16 100 %       Appropriate interventions for symptom management were initiated if applicable.  Appropriate diagnostic tests were initiated if indicated.    Important information for subsequent clinician:  Plan MRI and MRA series, reevaluation    I briefly evaluated the patient and developed an initial plan of care. I discussed this plan and explained that this brief interaction does not constitute a full evaluation. Patient/family understands that they should wait to be fully evaluated and discuss any test results with another clinician prior to leaving the hospital.       Toño Charlton MD  07/31/23 3290

## 2023-07-31 NOTE — CONSULTS
"      [facility]    Stroke Consult Note    Reason for Consult:  Dizziness    Chief Complaint: Dizziness       HPI  Lev Olvera is a 53 year old male with PMH of HTN, KATHERINE who comes in for the evaluation of headache, dizziness and nausea. It is reported that symptoms started on Saturday when he woke up with feeling of imbalance that he initially attributed to drinking the day prior. The next day the symptoms continued with headache and nausea. He was able to go to boat with the symptoms. Today the symptoms appeared to continue and not resolving so he decided to come in for evaluation.     Stroke Evaluation Summarized    MRI/Head CT MRI brain: L cerebellar diffusion restriction. No evidence of HT   Intracranial Vasculature L vert artery intradural segment decreased flow   Cervical Vasculature L vert dissection/occlusion     Echocardiogram pending   EKG/Telemetry pending   Other Testing Not Applicable      LDL  No lab value available in past 30 days   A1C  No lab value available in past 90 days   Troponin No lab value available in past 48 hrs       Impression  Acute ischemic stroke of L cerebellum due to large-artery atherosclerosis  L vertebral occlusion/dissection    Recommendations   - Use orderset: \"Ischemic Stroke Routine Admission\" or \"Ischemic Stroke No Thrombolytics/No Thrombectomy ICU Admission\"  - Neurochecks and Vital Signs every 2h   - Permissive HTN; goal SBP < 220 mmHg  - Daily aspirin 81 mg mg for secondary stroke prevention  - Statin: 40 mg  - TTE (with Bubble Study if age 60 yrs or less)  - Telemetry, EKG  - Bedside Glucose Monitoring  - A1c, Lipid Panel, Troponin x 3  - PT/OT/SLP  - Stroke Education  - Euthermia, Euglycemia    Patient Follow-up    - final recommendation pending work-up    Thank you for this consult. We will continue to follow.     The Stroke Staff is Dr. Umana.    Oralia Estes MD  Vascular Neurology Fellow    To page me or covering stroke neurology team " "member, click here: [facility]COM  Choose \"On Call\" tab at top, then select \"NEUROLOGY/ALL SITES\" from middle drop-down box, press Enter, then look for \"stroke\" or \"telestroke\" for your site.  _____________________________________________________    Clinically Significant Risk Factors Present on Admission                  # Hypertension: Noted on problem list               Past Medical History    Past Medical History:   Diagnosis Date    Sleep apnea      Medications   Home Meds  Prior to Admission medications    Medication Sig Start Date End Date Taking? Authorizing Provider   amLODIPine (NORVASC) 5 MG tablet TAKE 1 TABLET BY MOUTH EVERY DAY 6/6/23   Deejay De Los Santos MD   fluticasone (FLONASE) 50 MCG/ACT nasal spray Spray 2 sprays into both nostrils daily  Patient not taking: Reported on 5/11/2023 5/11/22   Alberta Driscoll MD   lisinopril (ZESTRIL) 20 MG tablet TAKE 1 TABLET BY MOUTH EVERY DAY 7/26/23   Deejay De Los Santos MD   mupirocin (BACTROBAN) 2 % external ointment Apply topically 3 times daily Around eye 5/11/23   Sydnie Sanchez APRN CNP   predniSONE (DELTASONE) 20 MG tablet Take 1 tablet (20 mg) by mouth daily 5/11/23   Sydnie Sanchez APRN CNP   triamcinolone (KENALOG) 0.1 % external ointment APPLY SPARINGLY TO AFFECTED AREA 3 TIMES A DAY 4/6/23   Reported, Patient       Scheduled Meds      Infusion Meds      Allergies   Allergies   Allergen Reactions    Penicillins Hives    Trazodone Hcl      dizzy          PHYSICAL EXAMINATION   Temp:  [97.8  F (36.6  C)] 97.8  F (36.6  C)  Pulse:  [71-77] 71  Resp:  [16] 16  BP: (136-140)/(87-91) 136/91  SpO2:  [100 %] 100 %      Stroke Scales    NIHSS  1a. Level of Consciousness 0-->Alert, keenly responsive   1b. LOC Questions 0-->Answers both questions correctly   1c. LOC Commands 0-->Performs both tasks correctly   2.   Best Gaze 0-->Normal   3.   Visual 0-->No visual loss   4.   Facial Palsy 0-->Normal symmetrical movements   5a. Motor Arm, Left 0-->No drift, limb " holds 90 (or 45) degrees for full 10 secs   5b. Motor Arm, Right 0-->No drift, limb holds 90 (or 45) degrees for full 10 secs   6a. Motor Leg, Left 0-->No drift, leg holds 30 degree position for full 5 secs   6b. Motor Leg, right 0-->No drift, leg holds 30 degree position for full 5 secs   7.   Limb Ataxia 0-->Absent   8.   Sensory 0-->Normal, no sensory loss   9.   Best Language 0-->No aphasia, normal   10. Dysarthria 0-->Normal   11. Extinction and Inattention  0-->No abnormality   Total 0 (07/31/23 1712)       Imaging  I personally reviewed all imaging; relevant findings per HPI.    Labs Data   CBC  Recent Labs   Lab 07/31/23  1239   WBC 6.2   RBC 5.18   HGB 16.1   HCT 47.9        Basic Metabolic Panel   Recent Labs   Lab 07/31/23  1239      POTASSIUM 4.3   CHLORIDE 102   CO2 25   BUN 12.6   CR 1.09   *   JEANETH 9.8     Liver Panel  No results for input(s): PROTTOTAL, ALBUMIN, BILITOTAL, ALKPHOS, AST, ALT, BILIDIRECT in the last 168 hours.  INR  No lab results found.        Stroke Consult Data Data   This was a non-emergent, non-telestroke consult.

## 2023-07-31 NOTE — ED PROVIDER NOTES
History     Chief Complaint:  Dizziness     The history is provided by the patient.      Lev Olvera is a 53 year old male with a history of hypertension and KATHERINE who presents to the emergency department for dizziness. The patient states that since Saturday morning, he has been experiencing left-sided neck pain. He reports experiencing dizziness, nausea, and a headache. He notes that he has had swimmer's ear for years and thought that these symptoms may be that, but then began experiencing neck pain. He denies any recent chiropractor use. Denies any falls or neck trauma. Denies fever or cough. He adds that he currently takes medications to lower blood pressure such as amlodipine and lisinopril.    Independent Historian:   None - Patient Only    Review of External Notes:        Medications:    Amlodipine  Lisinopril  Prednisone    Past Medical History:    KATHERINE  Hypertension   Nasal valve collapse  Deviated nasal septum  S/P rhinoplasty    Past Surgical History:    Tooth extraction  Rhinoplasty    Physical Exam   Patient Vitals for the past 24 hrs:   BP Temp Temp src Pulse Resp SpO2   07/31/23 1540 (!) 136/91 -- -- 71 -- --   07/31/23 1229 (!) 140/87 97.8  F (36.6  C) Oral 77 16 100 %      Physical Exam    Gen: well appearing, in no acute distress  Oral : Mucous membranes moist,   Nose: No rhinorhea  Ears: External near normal, without drainage  Eyes: periorbital tissues and sclera normal   Neck: supple, no abnormal swelling  Lungs: Clear bilaterally, no tachypnea or distress, speaks full sentences  CV: Regular rate, regular rhythm  Abd: soft, nontender, nondistended, no rebound/guarding  Ext: no lower extremity edema  Skin: warm, dry, well perfused, no rashes/bruising/lesions on exposed skin  Neuro: alert, no gross motor or sensory deficits,   Psych: pleasant mood, normal affect    Emergency Department Course   ECG  ECG taken at 1318, ECG read at 1420  Sinus rhythm  Minimal voltage criteria for LVH, may be normal  variant( R in aVL)   No significant change as compared to prior, dated 01/11/19.  Rate 65 bpm. OK interval 134 ms. QRS duration 94 ms. QT/QTc 378/393 ms. P-R-T axes 54 3 37.     Imaging:  MRA Neck (Carotids) wo & w Contrast   Final Result   Abnormal   IMPRESSION:       1.  Constellation of findings suspicious for left vertebral artery   dissection as described above. Catheter directed angiography could   further evaluate if clinically indicated.   2.  Unremarkable MRA appearance of the right vertebral artery and   carotid arteries.      [Critical Result: Suspicion for left vertebral artery dissection with   recent left PICA infarct.]      Finding was identified on 7/31/2023 3:25 PM.       Dr. Artis was contacted by Dr. Bishop on 7/31/2023 at 3:30 PM   and verbalized understanding of the critical result.       PAIGE BISHOP MD            SYSTEM ID:  [id number]      MR Brain w/o & w Contrast   Final Result   Abnormal   IMPRESSION:      1.  Large acute versus early subacute infarct involving the left PICA   territory as described above. Moderate edema-like signal is seen   associated with the infarcted tissue resulting in slight mass effect   on the fourth ventricle. No evidence of hydrocephalus at this time.   2.  No evidence of hemorrhagic conversion.   3.  Small remote lacunar infarct versus dilated perivascular space in   the right basal ganglia.   4.  Head and neck MRA reported separately.      [Critical Result: Large acute versus early subacute left PICA   territory infarct.]      Finding was identified on 7/31/2023 3:25 PM.       Dr. Artis was contacted by Dr. Bishop on 7/31/2023 at 3:30 PM   and verbalized understanding of the critical result.       PAIGE BISHOP MD            SYSTEM ID:  [id number]      MRA Brain ([place]) wo Contrast   Final Result   Abnormal   IMPRESSION:      1.  Absence of normal flow-related signal within the left vertebral   artery intradural segment, likely  related to dissection in correlation   with contemporaneous neck MRA.   2.  Unremarkable MRA of the anterior circulation.      [Critical Result: Absence of normal flow-related signal within the   left vertebral artery intradural segment.]      Finding was identified on 7/31/2023 3:25 PM.       Dr. Artis was contacted by Dr. Bishop on 7/31/2023 at 3:30 PM   and verbalized understanding of the critical result.       PAIGE BISHOP MD            SYSTEM ID:  [id number]         Report per radiology    Laboratory:  Labs Ordered and Resulted from Time of ED Arrival to Time of ED Departure   BASIC METABOLIC PANEL - Abnormal       Result Value    Sodium 139      Potassium 4.3      Chloride 102      Carbon Dioxide (CO2) 25      Anion Gap 12      Urea Nitrogen 12.6      Creatinine 1.09      Calcium 9.8      Glucose 107 (*)     GFR Estimate 82     CBC WITH PLATELETS AND DIFFERENTIAL    WBC Count 6.2      RBC Count 5.18      Hemoglobin 16.1      Hematocrit 47.9      MCV 93      MCH 31.1      MCHC 33.6      RDW 12.7      Platelet Count 228      % Neutrophils 61      % Lymphocytes 29      % Monocytes 7      % Eosinophils 2      % Basophils 1      % Immature Granulocytes 0      NRBCs per 100 WBC 0      Absolute Neutrophils 3.8      Absolute Lymphocytes 1.8      Absolute Monocytes 0.4      Absolute Eosinophils 0.1      Absolute Basophils 0.0      Absolute Immature Granulocytes 0.0      Absolute NRBCs 0.0       Emergency Department Course & Assessments:    Interventions:  Medications   gadobutrol (GADAVIST) injection 10 mL (10 mLs Intravenous $Given 7/31/23 1518)   sodium chloride (PF) 0.9% PF flush 100 mL (100 mLs Intravenous $Given 7/31/23 1518)      Assessments:  1749 I obtained history and examined the patient as noted above.  1658 I rechecked the patient and explained findings.    Independent Interpretation (X-rays, CTs, rhythm strip):  None    Consultations/Discussion of Management or Tests:  1529 I spoke with the  radiologist, Dr. Vicente, regarding the patient.  1544 I spoke with Oralia CASTANEDA, stroke neurology, regarding the patient.   1655 I spoke with Oralia CASTANEDA, stroke neurology, regarding the patient.   1719 I spoke with Dr. Crowder, hospitalist, regarding the patient. They accept admission.    Social Determinants of Health affecting care:   None    Disposition:  The patient was admitted to the hospital under the care of Dr. Crowder.    Impression & Plan      Medical Decision Making:  Patient presents with dizziness for few days, MRI/MRA ordered from triage and positive for acute stroke and likely vertebral artery occlusion/dissection on the left.  Stroke neurology was contacted shortly after the MRI results were sent to me.  They met with the patient and recommended 81 mg tablet of aspirin for now.  No heparin.  We will plan on inpatient management.  Contacted hospitalist is in agreement.    Diagnosis:    ICD-10-CM    1. 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 Vertebral artery dissection (H)  I77.74          Scribe Disclosure:  I, Jet Faye, am serving as a scribe at 3:25 PM on 7/31/2023 to document services personally performed by Antonio Artis,based on my observations and the provider's statements to me.     7/31/2023   Antonio Artis,       Antonio Artis MD  07/31/23 2384

## 2023-07-31 NOTE — H&P
[facility]    History and Physical - Hospitalist Service       Date of Admission:  7/31/2023    Assessment & Plan      Lev Olvera is a 53 year old male admitted on 7/31/2023 found to have a stroke in due to a left vertebral artery dissection.    He has a PMHx of again for hypertension and obstructive sleep apnea.      Vertebral artery dissection  Posterior circulation stroke  *Patient notes he developed dizziness, headache, unsteadiness 2 days prior to presentation.  *Elevated by stroke neurology in the ED.  *MRI brain revealed Large acute versus early subacute infarct involving the left PICA  territory as described above. Moderate edema-like signal is seen associated with the infarcted tissue resulting in slight mass effect on the fourth ventricle. No evidence of hydrocephalus at this time. 2.  No evidence of hemorrhagic conversion.  *MRA head and neck suspicious for left vertebral artery dissection   Plan  -- Admit to patient  -- Neurochecks Q 2H   -- Permissive HTN; goal SBP < 220 mmHg  -- Daily aspirin 81 mg mg for secondary stroke prevention per stroke neurology   -- Atorvastatin 40 mg  -- TTE with Bubble Study if age 60 yrs or less)  -- Telemetry, EKG  -- Bedside Glucose Monitoring  -- A1c, Lipid Panel, Troponin x 3  -- PT/OT/SLP  -- NPO pending swallow eval   --Stroke neurology consult.       Hypertension  --Hold PTA anti- hypertensives due to permissive hypertension as above.    KATHERINE  -- CPAP at night.      Diet:  NPO pending bedside swallow eval  DVT Prophylaxis: Pneumatic Compression Devices  Ernandez Catheter: Not present  Lines: None     Cardiac Monitoring: None  Code Status:   Full code    Clinically Significant Risk Factors Present on Admission                  # Hypertension: Noted on problem list               Disposition Plan      Expected Discharge Date: 08/02/2023                  Marie Crowder MD  Hospitalist Service  [facility]  Securely message  with Johnathan (more info)  Text page via [facility] Paging/Directory     ______________________________________________________________________    Chief Complaint   Headache, dizziness, unsteadiness    History is obtained from the patient    History of Present Illness   Lev Olvera is a 53 year old male who presents with the above complaints.  He notes his symptoms started 2 days prior to presentation.  He initially thought this was related to alcohol use the night before in addition to previous vertigo symptoms.  However patient notes that after going to work today, his symptoms actually did not improve and he continued to be unsteady on his feet causing him to present to the ER today.  He describes his headache as throbbing and posterior in nature.  He also admits to neck stiffness.  Denies any visual disturbances.  No focal deficits.  No speech impairment.  No chest pain, no shortness of breath, no fever or chills.  No diarrhea constipation.    In the ED, a stroke alert was called prompting evaluation by the stroke neurologist.  MRI brain revealed a large acute early subacute infarct in the posterior circulation.  MRA head and neck also revealed suspicion of left vertebral artery dissection.  Other labs including CBC and BMP are unremarkable.      Past Medical History    Past Medical History:   Diagnosis Date     Sleep apnea        Past Surgical History   Past Surgical History:   Procedure Laterality Date     SEPTOPLASTY, TURBINOPLASTY, COMBINED N/A 11/26/2019    Procedure: Septorhinoplasty with repair of nasal valve collaspe, bilateral turbinate reduction,;  Surgeon: Alberta Driscoll MD;  Location:  OR     SURGICAL HISTORY OF -       Tooth extraction       Prior to Admission Medications   Prior to Admission Medications   Prescriptions Last Dose Informant Patient Reported? Taking?   amLODIPine (NORVASC) 5 MG tablet   No No   Sig: TAKE 1 TABLET BY MOUTH EVERY DAY   fluticasone (FLONASE) 50 MCG/ACT nasal spray    No No   Sig: Spray 2 sprays into both nostrils daily   Patient not taking: Reported on 5/11/2023   lisinopril (ZESTRIL) 20 MG tablet   No No   Sig: TAKE 1 TABLET BY MOUTH EVERY DAY   mupirocin (BACTROBAN) 2 % external ointment   No No   Sig: Apply topically 3 times daily Around eye   predniSONE (DELTASONE) 20 MG tablet   No No   Sig: Take 1 tablet (20 mg) by mouth daily   triamcinolone (KENALOG) 0.1 % external ointment   Yes No   Sig: APPLY SPARINGLY TO AFFECTED AREA 3 TIMES A DAY      Facility-Administered Medications: None        Review of Systems    The 10 point Review of Systems is negative other than noted in the HPI or here.      Physical Exam   Vital Signs: Temp: 97.8  F (36.6  C) Temp src: Oral BP: (!) 144/93 Pulse: 75   Resp: 16 SpO2: 100 %      Weight: 0 lbs 0 oz    General Appearance: Well appearing for stated age.  Respiratory: CTAB, no rales or ronchi  Cardiovascular: S1, S2 normal, no murmurs  GI: non-tender on palpation, BS present  Neuro: No focal deficits.    Medical Decision Making       55 MINUTES SPENT BY ME on the date of service doing chart review, history, exam, documentation & further activities per the note.      Data     I have personally reviewed the following data over the past 24 hrs:    6.2  \   16.1   / 228     139 102 12.6 /  107 (H)   4.3 25 1.09 \       Imaging results reviewed over the past 24 hrs:   Recent Results (from the past 24 hour(s))   MRA Brain ([place] [facility] [place]) wo Contrast   Result Value    Radiologist flags Absence of normal flow-related signal within the (AA)    Narrative    EXAM MRA BRAIN ([place] [facility] [place]) W/O CONTRAST 7/31/2023 3:02 PM    HISTORY: Headache, posterior neck pain, vertigo    COMPARISON: None.    TECHNIQUE: Using a 3D time-of-flight image acquisition technique, MRA  of the major arteries at the base of the brain was obtained without  intravenous contrast. Three-dimensional reconstructions of the head  MRA were created, which were reviewed by the  radiologist.    FINDINGS:     Anterior circulation: No high-grade stenosis or occlusion. No aneurysm  or evidence of high flow vascular malformation.    Posterior circulation: Absence of normal flow-related signal within  the left vertebral artery intradural segment, likely related to  dissection in correlation with findings on contemporaneous neck MRA.  Normal flow-related signal within the right vertebral artery  intradural segment and basilar artery. No aneurysm. Right fetal like  PCA.      Impression    IMPRESSION:    1.  Absence of normal flow-related signal within the left vertebral  artery intradural segment, likely related to dissection in correlation  with contemporaneous neck MRA.  2.  Unremarkable MRA of the anterior circulation.    [Critical Result: Absence of normal flow-related signal within the  left vertebral artery intradural segment.]    Finding was identified on 7/31/2023 3:25 PM.     Dr. Artis was contacted by Dr. Bishop on 7/31/2023 at 3:30 PM  and verbalized understanding of the critical result.     PAIGE BISHOP MD         SYSTEM ID:  [id number]   MR Brain w/o & w Contrast   Result Value    Radiologist flags Large acute versus early subacute left PICA (AA)    Narrative    EXAM: MR BRAIN W/O & W CONTRAST  7/31/2023 3:44 PM     HISTORY: Headache, posterior neck pain, vertigo       COMPARISON: None.    TECHNIQUE: Multiplanar, multisequence MR imaging of the head obtained  prior to, and after, intravenous contrast administration    CONTRAST: 10 mL Gadavist.    FINDINGS:    Calvarium/skull base: No focal marrow replacing lesion. Small  bilateral mastoid effusions, nonspecific.    Orbits: Within normal limits accounting for technique.    Paranasal sinuses: No substantial paranasal sinus disease.    Brain: Large confluent area of restricted diffusion centered within  the inferior aspect of the left cerebellar hemisphere with involvement  of the cerebellar tonsil. An additional tiny focus of  restricted  diffusion is seen along the left dorsolateral aspect of the medulla  (series 5/image 51). Moderate edema signal associated with the  infarcted tissue exerts slight mass effect on the fourth ventricle. No  evidence of hydrocephalus. No evidence of acute intracranial  hemorrhage. Basal cisterns are patent. Normal positioning and  morphology of the cerebellar tonsils. Small remote lacunar infarct  versus dilated perivascular space in the right basal ganglia. No  substantial background of white matter disease for patient's age.  Normal flow related signal within the major intracranial venous  structures. Brain MRA is reported separately. No abnormal enhancing  intracranial lesions.      Impression    IMPRESSION:    1.  Large acute versus early subacute infarct involving the left PICA  territory as described above. Moderate edema-like signal is seen  associated with the infarcted tissue resulting in slight mass effect  on the fourth ventricle. No evidence of hydrocephalus at this time.  2.  No evidence of hemorrhagic conversion.  3.  Small remote lacunar infarct versus dilated perivascular space in  the right basal ganglia.  4.  Head and neck MRA reported separately.    [Critical Result: Large acute versus early subacute left PICA  territory infarct.]    Finding was identified on 7/31/2023 3:25 PM.     Dr. Artis was contacted by Dr. Bishop on 7/31/2023 at 3:30 PM  and verbalized understanding of the critical result.     PAIGE BISHOP MD         SYSTEM ID:  [id number]   MRA Neck (Carotids) wo & w Contrast   Result Value    Radiologist flags (AA)     Suspicion for left vertebral artery dissection with    Narrative    EXAM: MRA NECK (CAROTIDS) W/O & W CONTRAST  7/31/2023 3:45 PM     HISTORY: Headache, posterior neck pain, vertigo       COMPARISON: None.    TECHNIQUE: Neck MRA: Limited non contrast 2DTOF images were obtained  of the mid-cervical region. Following intravenous gadolinium-based  contrast  administration, a contrast enhanced MRA of the neck/cervical  vessels was performed.  Three-dimensional reconstructions of the neck and head MRA were  created, which were reviewed by the radiologist.    CONTRAST: 10 mL Gadavist.    FINDINGS:    Aortic arch: Normal configuration. No high-grade stenosis or occlusion  involving the origins of the major branch vessels.    Carotid arteries: No substantial stenosis or occlusion. No evidence of  dissection.    Vertebral arteries: The left vertebral artery is faintly opacified at  its origin and through the V1 segment. However, the left vertebral  artery V2 segment becomes nonopacified with asymmetric areas of  intrinsically T1 hyperintense signal abnormality associated with the  vessel wall (for example series 10/images 23, 27, and 34). The left  vertebral artery remains non-opacified through the V3 and V4 segments  on 2D time-of-flight sequence. Faint opacification of the left V4  segment on post contrast sequence is likely from retrograde filling.  No high-grade stenosis, occlusion, or evidence of dissection involving  the right vertebral artery.    No focal extraspinal abnormality identified on limited evaluation.      Impression    IMPRESSION:     1.  Constellation of findings suspicious for left vertebral artery  dissection as described above. Catheter directed angiography could  further evaluate if clinically indicated.  2.  Unremarkable MRA appearance of the right vertebral artery and  carotid arteries.    [Critical Result: Suspicion for left vertebral artery dissection with  recent left PICA infarct.]    Finding was identified on 7/31/2023 3:25 PM.     Dr. Artis was contacted by Dr. Bishop on 7/31/2023 at 3:30 PM  and verbalized understanding of the critical result.     PAIGE BISHOP MD         SYSTEM ID:  [id number]

## 2023-07-31 NOTE — PHARMACY-ADMISSION MEDICATION HISTORY
Pharmacist Admission Medication History    Admission medication history is complete. The information provided in this note is only as accurate as the sources available at the time of the update.    Medication reconciliation/reorder completed by provider prior to medication history? No    Information Source(s): Patient, Family member, and CareEverywhere/SureScripts via in-person    Pertinent Information:     Changes made to PTA medication list:  Added: None  Deleted: Flonase, mupirocin ointment, prednisone, triamcinolone ointment  Changed: None    Medication Affordability:  Not including over the counter (OTC) medications, was there a time in the past 3 months when you did not take your medications as prescribed because of cost?: No    Allergies reviewed with patient and updates made in EHR: yes    Medication History Completed By: ODILON MARINO RPH 7/31/2023 5:42 PM    Prior to Admission medications    Medication Sig Last Dose Taking? Auth Provider Long Term End Date   amLODIPine (NORVASC) 5 MG tablet TAKE 1 TABLET BY MOUTH EVERY DAY 7/31/2023 at AM Yes Deejay De Los Santos MD Yes    lisinopril (ZESTRIL) 20 MG tablet TAKE 1 TABLET BY MOUTH EVERY DAY 7/31/2023 at AM Yes Deejay De Los Santos MD Yes

## 2023-08-01 ENCOUNTER — APPOINTMENT (OUTPATIENT)
Dept: CARDIOLOGY | Facility: CLINIC | Age: 53
DRG: 064 | End: 2023-08-01
Attending: HOSPITALIST
Payer: COMMERCIAL

## 2023-08-01 ENCOUNTER — APPOINTMENT (OUTPATIENT)
Dept: PHYSICAL THERAPY | Facility: CLINIC | Age: 53
DRG: 064 | End: 2023-08-01
Attending: HOSPITALIST
Payer: COMMERCIAL

## 2023-08-01 ENCOUNTER — APPOINTMENT (OUTPATIENT)
Dept: OCCUPATIONAL THERAPY | Facility: CLINIC | Age: 53
DRG: 064 | End: 2023-08-01
Attending: HOSPITALIST
Payer: COMMERCIAL

## 2023-08-01 ENCOUNTER — ALLIED HEALTH/NURSE VISIT (OUTPATIENT)
Dept: NEUROLOGY | Facility: CLINIC | Age: 53
End: 2023-08-01

## 2023-08-01 DIAGNOSIS — Z00.6 EXAMINATION OF PARTICIPANT OR CONTROL IN CLINICAL RESEARCH: Primary | ICD-10-CM

## 2023-08-01 LAB
ANION GAP SERPL CALCULATED.3IONS-SCNC: 13 MMOL/L (ref 7–15)
BUN SERPL-MCNC: 15.6 MG/DL (ref 6–20)
CALCIUM SERPL-MCNC: 9.3 MG/DL (ref 8.6–10)
CHLORIDE SERPL-SCNC: 101 MMOL/L (ref 98–107)
CREAT SERPL-MCNC: 1.03 MG/DL (ref 0.67–1.17)
DEPRECATED HCO3 PLAS-SCNC: 22 MMOL/L (ref 22–29)
ERYTHROCYTE [DISTWIDTH] IN BLOOD BY AUTOMATED COUNT: 12.8 % (ref 10–15)
GFR SERPL CREATININE-BSD FRML MDRD: 87 ML/MIN/1.73M2
GLUCOSE BLDC GLUCOMTR-MCNC: 106 MG/DL (ref 70–99)
GLUCOSE BLDC GLUCOMTR-MCNC: 106 MG/DL (ref 70–99)
GLUCOSE BLDC GLUCOMTR-MCNC: 121 MG/DL (ref 70–99)
GLUCOSE BLDC GLUCOMTR-MCNC: 129 MG/DL (ref 70–99)
GLUCOSE SERPL-MCNC: 117 MG/DL (ref 70–99)
HCT VFR BLD AUTO: 45.6 % (ref 40–53)
HGB BLD-MCNC: 15.5 G/DL (ref 13.3–17.7)
MCH RBC QN AUTO: 31.4 PG (ref 26.5–33)
MCHC RBC AUTO-ENTMCNC: 34 G/DL (ref 31.5–36.5)
MCV RBC AUTO: 92 FL (ref 78–100)
PLATELET # BLD AUTO: 229 10E3/UL (ref 150–450)
POTASSIUM SERPL-SCNC: 4.2 MMOL/L (ref 3.4–5.3)
RBC # BLD AUTO: 4.94 10E6/UL (ref 4.4–5.9)
SODIUM SERPL-SCNC: 136 MMOL/L (ref 136–145)
WBC # BLD AUTO: 6.5 10E3/UL (ref 4–11)

## 2023-08-01 PROCEDURE — 999N000226 HC STATISTIC SLP IP EVAL DEFER: Performed by: SPEECH-LANGUAGE PATHOLOGIST

## 2023-08-01 PROCEDURE — 250N000013 HC RX MED GY IP 250 OP 250 PS 637: Performed by: STUDENT IN AN ORGANIZED HEALTH CARE EDUCATION/TRAINING PROGRAM

## 2023-08-01 PROCEDURE — 255N000002 HC RX 255 OP 636: Performed by: HOSPITALIST

## 2023-08-01 PROCEDURE — 99232 SBSQ HOSP IP/OBS MODERATE 35: CPT | Performed by: HOSPITALIST

## 2023-08-01 PROCEDURE — 97161 PT EVAL LOW COMPLEX 20 MIN: CPT | Mod: GP

## 2023-08-01 PROCEDURE — 5A09357 ASSISTANCE WITH RESPIRATORY VENTILATION, LESS THAN 24 CONSECUTIVE HOURS, CONTINUOUS POSITIVE AIRWAY PRESSURE: ICD-10-PCS | Performed by: HOSPITALIST

## 2023-08-01 PROCEDURE — 999N000208 ECHOCARDIOGRAM COMPLETE

## 2023-08-01 PROCEDURE — 250N000013 HC RX MED GY IP 250 OP 250 PS 637: Performed by: HOSPITALIST

## 2023-08-01 PROCEDURE — 97112 NEUROMUSCULAR REEDUCATION: CPT | Mod: GP

## 2023-08-01 PROCEDURE — 97165 OT EVAL LOW COMPLEX 30 MIN: CPT | Mod: GO

## 2023-08-01 PROCEDURE — 85027 COMPLETE CBC AUTOMATED: CPT | Performed by: HOSPITALIST

## 2023-08-01 PROCEDURE — 93306 TTE W/DOPPLER COMPLETE: CPT | Mod: 26 | Performed by: INTERNAL MEDICINE

## 2023-08-01 PROCEDURE — 120N000013 HC R&B IMCU

## 2023-08-01 PROCEDURE — 36415 COLL VENOUS BLD VENIPUNCTURE: CPT | Performed by: HOSPITALIST

## 2023-08-01 PROCEDURE — 80048 BASIC METABOLIC PNL TOTAL CA: CPT | Performed by: HOSPITALIST

## 2023-08-01 PROCEDURE — 36415 COLL VENOUS BLD VENIPUNCTURE: CPT | Performed by: PSYCHIATRY & NEUROLOGY

## 2023-08-01 PROCEDURE — 97116 GAIT TRAINING THERAPY: CPT | Mod: GP

## 2023-08-01 PROCEDURE — 97535 SELF CARE MNGMENT TRAINING: CPT | Mod: GP

## 2023-08-01 PROCEDURE — 97535 SELF CARE MNGMENT TRAINING: CPT | Mod: GO

## 2023-08-01 RX ORDER — ATORVASTATIN CALCIUM 80 MG/1
80 TABLET, FILM COATED ORAL EVERY EVENING
Status: DISCONTINUED | OUTPATIENT
Start: 2023-08-01 | End: 2023-08-02 | Stop reason: HOSPADM

## 2023-08-01 RX ADMIN — HUMAN ALBUMIN MICROSPHERES AND PERFLUTREN 3 ML: 10; .22 INJECTION, SOLUTION INTRAVENOUS at 13:41

## 2023-08-01 RX ADMIN — SENNOSIDES AND DOCUSATE SODIUM 1 TABLET: 50; 8.6 TABLET ORAL at 08:00

## 2023-08-01 RX ADMIN — ATORVASTATIN CALCIUM 80 MG: 80 TABLET, FILM COATED ORAL at 21:07

## 2023-08-01 RX ADMIN — FAMOTIDINE 20 MG: 20 TABLET, FILM COATED ORAL at 21:07

## 2023-08-01 RX ADMIN — FAMOTIDINE 20 MG: 20 TABLET, FILM COATED ORAL at 08:00

## 2023-08-01 RX ADMIN — ASPIRIN 81 MG: 81 TABLET, COATED ORAL at 08:00

## 2023-08-01 ASSESSMENT — ACTIVITIES OF DAILY LIVING (ADL)
ADLS_ACUITY_SCORE: 20

## 2023-08-01 NOTE — CONSULTS
08/01/23 1533 Ana Falk, WENDY   Stroke Education Note     The following information has been reviewed with the patient and family:     1. Warning signs of stroke     2. Calling 911 if having warning signs of stroke     3. All modifiable risk factors: hypertension, CAD, atrial fib, diabetes, hypercholesterolemia, smoking, substance abuse, diet, physical inactivity, obesity, sleep apnea.     4. Patient's risk factors for stroke which include: HTN, KATHERINE, OBESITY, HLD, Diet     5. Follow-up plan for after discharge     6. Discharge medications which include: ASA,HTN, STATIN     In addition, the above information was given to the patient and family in writing as a part of the [facility] Stroke Class Handout.     Learner's response to risk factors / lifestyle modification education: Desire to change Ability to change Reasons to change Need to change Committment to change      Ana Falk RN,

## 2023-08-01 NOTE — DISCHARGE INSTRUCTIONS
Your risk factors for stroke or TIA (transient ischemic attack):    Your Risk Factors Your Results Normal Ranges   High blood pressure BP Readings from Last 1 Encounters:   08/01/23 117/84    Less than 120/80   Cholesterol              Total Lab Results   Component Value Date    CHOL 287 07/31/2023    CHOL 236 03/28/2005      Less than 150    Triglycerides   Lab Results   Component Value Date    TRIG 128 07/31/2023    TRIG 74 03/28/2005    Less than 150   LDL Lab Results   Component Value Date     07/31/2023     03/28/2005       Less than 70   HDL Lab Results   Component Value Date    HDL 60 07/31/2023    HDL 61 03/28/2005            Greater than 40 (men)  Greater than 50 (women)   Diabetes Recent Labs   Lab 08/01/23  0728   *    Fasting blood glucose    Smoking/tobacco use  Quit smoking and tobacco   Overweight  Lose 1-2 pounds a week   Lack of exercise  30 minutes moderate activity each day   Other risk factors include carotid (neck) artery disease, atrial fibrillation and stress. You may be on new medicine to treat high blood pressure, cholesterol, diabetes or atrial fibrillation.    Understanding Stroke Booklet given to patient. Please refer to booklet for further information.    Stroke warning signs and symptoms - CALL 911 right away for:  - Sudden numbness or weakness in the face, arm or leg (often on one side of the body).  - Sudden confusion or trouble understanding what is going on.  - Sudden blurred or decreased vision in one or both eyes.  - Sudden trouble speaking, loss of balance, dizziness or problems with coordination.  - Sudden, severe headache for no reason.  - Fainting or seizures.  - Symptoms may go away then come back suddenly.

## 2023-08-01 NOTE — PROGRESS NOTES
08/01/23 1100   Appointment Info   Signing Clinician's Name / Credentials (OT) Eileen Camargo OTR/L   Rehab Comments (OT) Initial OT eval   Living Environment   People in Home spouse   Current Living Arrangements house   Home Accessibility stairs to enter home;stairs within home   Number of Stairs, Main Entrance 6   Stair Railings, Main Entrance railings safe and in good condition   Number of Stairs, Within Home, Primary six   Stair Railings, Within Home, Primary railings safe and in good condition   Transportation Anticipated family or friend will provide   Living Environment Comments 4 level split. 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 is able to take a break from work. drives   General Information   Onset of Illness/Injury or Date of Surgery 07/31/23   Referring Physician Marie Simms MD   Patient/Family Therapy Goal Statement (OT) to go home   Additional Occupational Profile Info/Pertinent History of Current Problem Lev Olvera is a 53 year old male admitted on 7/31/2023 found to have a stroke in due to a left vertebral artery dissection.   General Observations and Info agreeable to OT   Cognitive Status Examination   Orientation Status orientation to person, place and time   Visual Perception   Visual Impairment/Limitations WFL   Impact of Vision Impairment on Function (Vision) report sno changes in vision. wears his glasses all the time. PT had issued a HEP already for vision   Sensory   Sensory Quick Adds sensation intact   Pain Assessment   Patient Currently in Pain No   Posture   Posture not impaired   Range of Motion Comprehensive   General Range of Motion no range of motion deficits identified    Strength Comprehensive (MMT)   General Manual Muscle Testing (MMT) Assessment no strength deficits identified   Coordination   Upper Extremity Coordination No deficits were identified   Bed Mobility   Bed Mobility No deficits identified   Transfers   Transfers No deficits identified   Balance   Balance Comments defer to PT. deficits present   Activities of Daily Living   BADL Assessment/Intervention bathing  (IADL deficits and safety concerns)   Bathing Assessment/Intervention   [place] Level (Bathing) minimum assist (75% patient effort)   Clinical Impression   Criteria for Skilled Therapeutic Interventions Met (OT) Yes, treatment indicated   OT Diagnosis decreased safety for IADL   OT Problem List-Impairments impacting ADL problems related to;activity tolerance impaired  (stroke deficits like balance, defer to PT)   Assessment of Occupational Performance 1-3 Performance Deficits   Identified Performance Deficits bathing, taxing IADL   Planned Therapy Interventions (OT) ADL retraining;IADL retraining   Clinical Decision Making Complexity (OT) low complexity   Anticipated Equipment Needs Upon Discharge (OT) shower chair   Risk & Benefits of therapy have been explained evaluation/treatment results reviewed;care plan/treatment goals reviewed;risks/benefits reviewed;current/potential barriers reviewed;participants voiced agreement with care plan;participants included;patient;spouse/significant other   Clinical Impression Comments decreased safety in ADL warrants skilled therapy   OT Total Evaluation Time   OT Eval, Low Complexity Minutes (68879) 10   OT Goals   Therapy Frequency (OT) One time eval and treatment   OT Predicted Duration/Target Date for Goal Attainment 08/01/23   OT Goals OT Goal 1;OT Goal 2   OT: Goal 1 Pt will verbalize shower transfer sequnece with shower chair   OT: Goal 2 pt will verbalize 3 safety techniques and modifications for home   Interventions   Interventions Quick Adds Self-Care/Home  Management   Self-Care/Home Management   Self-Care/Home Mgmt/ADL, Compensatory, Meal Prep Minutes (62254) 10   Symptoms Noted During/After Treatment (Meal Preparation/Planning Training) none   Treatment Detail/Skilled Intervention pt and wife present. supportive. educated on recommendatoins, impact of vestibular, neuor and vision systems on ADL and IADL such as driving. ed on neuor re ed priciapals. rec shower chair, where to obtain. questions answered, ed on stroke sgns and symptoms. verbalized 3 safeyt techniques for home. end of session all needs in reach   OT Discharge Planning   OT Plan intervention and discharge   OT Discharge Recommendation (DC Rec) home with assist   OT Rationale for DC Rec able to complete ADL safely but is a fall risk. defer balance and vestibular to PT.  driving cessation would be safest given vestibular deficits until neuro follow up or symptom resolution--defer formal rec to neuro team. no further OT needs. deficits being addressed by PT and anticipate that pt will be able to return to all ADL and IADL when sypmtoms/deficits resolve   Total Session Time   Timed Code Treatment Minutes 10   Total Session Time (sum of timed and untimed services) 20

## 2023-08-01 NOTE — PROGRESS NOTES
08/01/23 1010   Appointment Info   Signing Clinician's Name / Credentials (PT) Brittany Dressler, PT   Quick Adds   Quick Adds Vestibular Eval   Living Environment   People in Home spouse   Current Living Arrangements house   Home Accessibility stairs to enter home;stairs within home   Number of Stairs, Main Entrance 6   Stair Railings, Main Entrance railings safe and in good condition   Number of Stairs, Within Home, Primary six   Stair Railings, Within Home, Primary railings safe and in good condition   Transportation Anticipated family or friend will provide   Living Environment Comments 4 level split.   Self-Care   Usual Activity Tolerance excellent   Current Activity Tolerance good   Equipment Currently Used at Home none   Fall history within last six months no  (many close calls)   Activity/Exercise/Self-Care Comment Ind no AD.   General Information   Onset of Illness/Injury or Date of Surgery 07/31/23   Referring Physician Marie iSmms MD   Patient/Family Therapy Goals Statement (PT) To be good again, to not make things worse.   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 Echo pending. HTN, HLD, alcohol use, glaucoma.   Existing Precautions/Restrictions fall   Cognition   Affect/Mental Status (Cognition) WFL   Follows Commands (Cognition) WFL   Pain Assessment   Patient Currently in Pain No   Posture    Posture Comments IMpaired controlled mobility   Range of Motion (ROM)   ROM Comment WFL   Strength (Manual Muscle Testing)   Strength Comments 5B sidelying hip abd (pt reporting L hip feeling weak wtih amb - motor dyscontrol vs imbalance?), 5B seated: hip abd/add/flx, knee flx/ext, DF   Bed Mobility   Comment, (Bed Mobility) Ind   Transfers   Comment, (Transfers) Ricardo no AD   Gait/Stairs (Locomotion)   Distance in Feet Self-selected gait: SBA 5' no AD  unsteady with path dev, slowed, tense but rushed.   Distance in Feet (Gait) 500' no AD ModA during tandem, SBA otherwisew ith path dev with H > V head turns, path dev with EC amb, slowed with added (vestib) challenges. Training on stability before mobility for harder tasks like obstacle navigation with improved safety performance also noted by pt.   Comment, (Gait/Stairs) 4 stairs 2 rails reciprocally with postural dyscontrol managed via rail use. Supervision. Symmetrical powering.   Balance   Balance Comments FGA 14/30 (falls risk < 23).   Coordination   Coordination Comments WNL alt toe taps, alt heel taps, alt heel/toe taps, finger-thumb, finger-nose EC B.   Muscle Tone   Muscle Tone Comments 0B gastrocs.   Oculomotor Exam   Smooth Pursuit Comment WFL, full range B.   Saccades Comments Undershoots but suppresses wtih effort.   VOR Comments Slips at faster speeds, asx 30sec Head Shake Test.   Convergence Testing Comments Variably dysconjugate gaze, compensates with R head turn, able to suppress with increased effort and attention to task.   Dynamic Visual Acuity (DVA)   Static Acuity (LogMar) Line 7   Horizontal Head Movement at 2 Hz (LogMar) Line 4   DVA Comments Asx   Clinical Impression   Criteria for Skilled Therapeutic Intervention Yes, treatment indicated   PT Diagnosis (PT) Impaired mobility   Influenced by the following impairments Impaired balance, vestibular - central, oculo-motor, safe decision-making.   Functional limitations due to impairments Impaired independent mobility, living, driving.   Clinical Presentation (PT Evaluation Complexity) Evolving/Changing   Clinical Decision Making (Complexity) low complexity   Planned Therapy Interventions (PT) balance training;home exercise program;neuromuscular re-education;patient/family education;postural re-education;stair training;strengthening;transfer training;progressive activity/exercise;risk factor education;home program guidelines   Anticipated  Equipment Needs at Discharge (PT) walker, rolling;cane, straight  (outdoors)   Risk & Benefits of therapy have been explained evaluation/treatment results reviewed;care plan/treatment goals reviewed;risks/benefits reviewed;current/potential barriers reviewed;participants voiced agreement with care plan;participants included;patient;spouse/significant other   PT Total Evaluation Time   PT Eval, Low Complexity Minutes (27601) 10   Physical Therapy Goals   PT Frequency 2x/day   PT Predicted Duration/Target Date for Goal Attainment 08/04/23   PT Goals Transfers;Gait;Stairs;PT Goal 1;PT Goal 2   PT: Transfers Modified independent;Sit to/from stand;Bed to/from chair   PT: Gait Modified independent;Greater than 200 feet   PT: Stairs Modified independent;Greater than 10 stairs  (1 rail)   PT: Goal 1 Pt will score >= 23 on the FGA to be low falls risk outdoors, OR have AD/safety recs and PT Plan in place.   PT: Goal 2 Pt will be Ricardo HEP teach back with handout.   Interventions   Interventions Quick Adds Neuromuscular Re-ed;Self-Care/Home Mgmt;Therapeutic Activity;Gait Training;Therapeutic Procedure   Therapeutic Procedure/Exercise   Treatment Detail/Skilled Intervention VOR strengthening per HEP.   Therapeutic Activity   Treatment Detail/Skilled Intervention Ricardo no AD   Self-Care/Home Management   Self-Care/Home Mgmt/ADL, Compensatory, Meal Prep Minutes (11854) 10   Symptoms Noted During/After Treatment none   Treatment Detail/Skilled Intervention PT edu pt and wife/caregiver on: stability before mobility, falls risk per FGA, AD rec: RW use outside, (maryanne)infarct zones in relation to rehab and evidence-based intensity/pacing acutely for neuroplasticity per wife questions. HEP instruction: seated saccades, VORx1, VORc, convergence - 2min each or until sx, 3-5x/day. Finished in chair alamr armed, edu up with assist.   Gait Training   Gait Training Minutes (44711) 10   Symptoms Noted During/After Treatment (Gait Training)  none   Treatment Detail/Skilled Intervention Additional 4 stairs 1 rail Supervision slowed for self-contained minor instabilites.   Neuromuscular Re-education   Treatment Detail/Skilled Intervention FGA 14   PT Discharge Planning   PT Plan PT: vestib-biased balance progressions (mCTSIB, EC, compliant surfaces, head & body turns, VORx1 HEP review - handout in room).   PT Discharge Recommendation (DC Rec) home with assist;home with outpatient physical therapy   PT Rationale for DC Rec Home with AD vs Ax1 outdoors, do not recommend driving given vestibular deficits. OP PT indicated to progress balance, central vestib hypofunction.   PT Brief overview of current status FGA 14 (falls risk < 23), SBA no AD self-selected gait, ModA during tandem amb.   Total Session Time   Timed Code Treatment Minutes 20   Total Session Time (sum of timed and untimed services) 30

## 2023-08-01 NOTE — PLAN OF CARE
SLP: ST orders received, chart reviewed and discussed with pt and wife. No reported changes in speech/language/cognitive or swallow function. Nausea resolved and pt reported good appetite. Pt in agreement with no indication for SLP services at this time.

## 2023-08-01 NOTE — PLAN OF CARE
Goal Outcome Evaluation:    Pt here with L vertebral artery dissection and stroke. A&Ox4. No neuro deficits. VSS on RA. Tele NSR. Regular diet, thin liquids. Takes pills whole. Up ind, steady. Complains of achy pain on pos neck, relieved by ice packs. Bubble echo done earlier, came back normal. Plan for stroke neuro to see yet today.

## 2023-08-01 NOTE — PROGRESS NOTES
RECEIVING UNIT ED HANDOFF REVIEW    ED Nurse Handoff Report was reviewed by: Erica Gurrola RN on July 31, 2023 at 7:21 PM

## 2023-08-01 NOTE — PLAN OF CARE
Reason for Admission: L cerebellar stroke, L vertebral occlusion       Cognitive/Mentation: A&O x 4  Neuros/CMS: Intact   VS: Stable on RA. CPAP at night, SBP <220   Tele: NSR   GI: BS active, + flatus. Continent.  : Voiding. Continent.  Pulmonary: LS clear.  Pain: Discomfort/HA on back of neck 3-4/10 - applied ice packs     Drains/Lines: PIV SL   Skin: Intact  Activity: SBA   Diet: Regular with thin liquids. Takes pills whole.      Therapies recs: Pending   Discharge: Pending      Aggression Stoplight Tool: Green      End of shift summary: Pt stable overnight, no neuro changes. Plan for echo & therapies today

## 2023-08-01 NOTE — PHARMACY-CONSULT NOTE
Pharmacy Consult to evaluate for medication related stroke core measures    Lev Olvera, 53 year old male admitted for acute ischemic stroke on 7/31/2023.    Thrombolytic was not given because of Time from onset contraindications    VTE Prophylaxis SCDs /PCDs placed on 7/31, as appropriate prior to end of hospital day 2.    Antithrombotic: aspirin started on 7/31, as appropriate by end of hospital day 2. Continue antithrombotic therapy on discharge to meet quality measures, unless contraindicated.    Anticoagulation if history of A-fib/flutter: Patient does not have history of A-fib/flutter - anticoagulation not required for medication related stroke core measures.     LDL Cholesterol Calculated   Date Value Ref Range Status   07/31/2023 201 (H) <=100 mg/dL Final   03/28/2005 160 (H) 0 - 129 mg/dL Final       Patient currently receiving Lipitor (atorvastatin) continue statin on discharge to meet quality measures, unless contraindicated.    Recommendations: None at this time    Thank you for the consult.    Roxanne Catalan [facility] 8/1/2023 12:40 PM

## 2023-08-02 ENCOUNTER — APPOINTMENT (OUTPATIENT)
Dept: CT IMAGING | Facility: CLINIC | Age: 53
DRG: 064 | End: 2023-08-02
Attending: STUDENT IN AN ORGANIZED HEALTH CARE EDUCATION/TRAINING PROGRAM
Payer: COMMERCIAL

## 2023-08-02 ENCOUNTER — APPOINTMENT (OUTPATIENT)
Dept: PHYSICAL THERAPY | Facility: CLINIC | Age: 53
DRG: 064 | End: 2023-08-02
Payer: COMMERCIAL

## 2023-08-02 VITALS
HEART RATE: 85 BPM | SYSTOLIC BLOOD PRESSURE: 134 MMHG | TEMPERATURE: 98.2 F | BODY MASS INDEX: 27.19 KG/M2 | OXYGEN SATURATION: 95 % | WEIGHT: 220.46 LBS | DIASTOLIC BLOOD PRESSURE: 86 MMHG | RESPIRATION RATE: 18 BRPM

## 2023-08-02 LAB — GLUCOSE BLDC GLUCOMTR-MCNC: 122 MG/DL (ref 70–99)

## 2023-08-02 PROCEDURE — 70450 CT HEAD/BRAIN W/O DYE: CPT

## 2023-08-02 PROCEDURE — 99233 SBSQ HOSP IP/OBS HIGH 50: CPT | Mod: GC | Performed by: PSYCHIATRY & NEUROLOGY

## 2023-08-02 PROCEDURE — 250N000013 HC RX MED GY IP 250 OP 250 PS 637: Performed by: STUDENT IN AN ORGANIZED HEALTH CARE EDUCATION/TRAINING PROGRAM

## 2023-08-02 PROCEDURE — 250N000013 HC RX MED GY IP 250 OP 250 PS 637: Performed by: HOSPITALIST

## 2023-08-02 PROCEDURE — 97112 NEUROMUSCULAR REEDUCATION: CPT | Mod: GP | Performed by: PHYSICAL THERAPIST

## 2023-08-02 PROCEDURE — 97116 GAIT TRAINING THERAPY: CPT | Mod: GP | Performed by: PHYSICAL THERAPIST

## 2023-08-02 PROCEDURE — 99239 HOSP IP/OBS DSCHRG MGMT >30: CPT | Performed by: HOSPITALIST

## 2023-08-02 RX ORDER — CLOPIDOGREL BISULFATE 75 MG/1
75 TABLET ORAL DAILY
Status: DISCONTINUED | OUTPATIENT
Start: 2023-08-03 | End: 2023-08-02 | Stop reason: HOSPADM

## 2023-08-02 RX ORDER — CLOPIDOGREL BISULFATE 75 MG/1
300 TABLET ORAL ONCE
Status: COMPLETED | OUTPATIENT
Start: 2023-08-02 | End: 2023-08-02

## 2023-08-02 RX ORDER — CLOPIDOGREL BISULFATE 75 MG/1
75 TABLET ORAL DAILY
Status: DISCONTINUED | OUTPATIENT
Start: 2023-08-02 | End: 2023-08-02

## 2023-08-02 RX ORDER — CLOPIDOGREL BISULFATE 75 MG/1
75 TABLET ORAL DAILY
Qty: 16 TABLET | Refills: 0 | Status: SHIPPED | OUTPATIENT
Start: 2023-08-03 | End: 2023-09-19

## 2023-08-02 RX ORDER — ASPIRIN 81 MG/1
81 TABLET, CHEWABLE ORAL DAILY
Qty: 30 TABLET | Refills: 3 | Status: SHIPPED | OUTPATIENT
Start: 2023-08-03

## 2023-08-02 RX ORDER — ATORVASTATIN CALCIUM 80 MG/1
80 TABLET, FILM COATED ORAL EVERY EVENING
Qty: 30 TABLET | Refills: 3 | Status: SHIPPED | OUTPATIENT
Start: 2023-08-02 | End: 2023-12-19

## 2023-08-02 RX ADMIN — ASPIRIN 81 MG: 81 TABLET, COATED ORAL at 09:45

## 2023-08-02 RX ADMIN — FAMOTIDINE 20 MG: 20 TABLET, FILM COATED ORAL at 09:45

## 2023-08-02 RX ADMIN — CLOPIDOGREL BISULFATE 300 MG: 75 TABLET ORAL at 14:14

## 2023-08-02 ASSESSMENT — ACTIVITIES OF DAILY LIVING (ADL)
ADLS_ACUITY_SCORE: 20

## 2023-08-02 NOTE — CONSULTS
Care Management Discharge Note    Discharge Date: 08/02/2023       Discharge Disposition: Home    Discharge Services:      Discharge DME:      Discharge Transportation: family or friend will provide    Private pay costs discussed: Not applicable    Does the patient's insurance plan have a 3 day qualifying hospital stay waiver?  No          Education Provided on the Discharge Plan:    Persons Notified of Discharge Plans:   Patient/Family in Agreement with the Plan:  yes    Handoff Referral Completed: Yes    Additional Information:  Patient will discharge to home with wife today.  He needs follow up with PCP and agrees to have appointment scheduled.  He states he will be off work so available any time for appointment.  This was scheduled.  No other discharge needs.    Aug 07, 2023  3:30 PM  (Arrive by 3:15 PM)  ED/Hospital Follow Up with Deejay De Los Santos MD  [facility] ([facility] )       Gail Landa RN

## 2023-08-02 NOTE — PROGRESS NOTES
"[facility]    Stroke Progress Note    Interval Events  No significant overnight events. Clinically remains stable    HPI Summary  Lev Olvera is a 53 year old male with PMH of HTN, KATHERINE who comes in for the evaluation of headache, dizziness and nausea. It is reported that symptoms started on Saturday when he woke up with feeling of imbalance that he initially attributed to drinking the day prior. The next day the symptoms continued with headache and nausea. He was able to go to boat with the symptoms. Today the symptoms appeared to continue and not resolving so he decided to come in for evaluation.      Stroke Evaluation Summarized    MRI/Head CT MRI brain: L cerebellar diffusion restriction. No evidence of HT   Intracranial Vasculature L vert artery intradural segment decreased flow   Cervical Vasculature L vert dissection/occlusion      Echocardiogram EF  60-65%, normal atrial size   EKG/Telemetry pending   Other Testing Not Applicable      LDL  7/31/2023: 201 mg/dL   A1C  7/31/2023: 5.5 %   Troponin 7/31/2023: 7 ng/L     Impression  Acute ischemic stroke of L cerebellum due to large-artery atherosclerosis  L vertebral occlusion/dissection     Recommendations   - Neurochecks and Vital Signs every 4h   - Permissive HTN; goal SBP < 220 mmHg  - Daily aspirin 81 mg mg for secondary stroke prevention  - Statin: 80 mg lipitor  - Telemetry, EKG  - Bedside Glucose Monitoring  - PT/OT/SLP  - Stroke Education  - Euthermia, Euglycemia     Patient Follow-up    - final recommendation pending work-up     Thank you for this consult. We will continue to follow.      The Stroke Staff is Dr. Umana.     Oralia Estes MD  Vascular Neurology Fellow     To page me or covering stroke neurology team member, click here: [facility]  Choose \"On Call\" tab at top, then select \"NEUROLOGY/ALL SITES\" from middle drop-down box, press Enter, then look for \"stroke\" or \"telestroke\" for your " site.  ______________________________________________________    Clinically Significant Risk Factors                  # Hypertension: Noted on problem list                   Medications   Scheduled Meds   aspirin  81 mg Oral Daily    Or    aspirin  81 mg Oral or NG Tube Daily    atorvastatin  80 mg Oral QPM    famotidine  20 mg Oral BID    senna-docusate  1-2 tablet Oral or NG Tube BID    sodium chloride (PF)  3 mL Intracatheter Q8H       Infusion Meds   - MEDICATION INSTRUCTIONS -      - MEDICATION INSTRUCTIONS -         PRN Meds  labetalol **OR** hydrALAZINE, lidocaine 4%, lidocaine (buffered or not buffered), - MEDICATION INSTRUCTIONS -, - MEDICATION INSTRUCTIONS -, ondansetron **OR** ondansetron, sodium chloride (PF)       PHYSICAL EXAMINATION  Temp:  [97.8  F (36.6  C)-98.4  F (36.9  C)] 98  F (36.7  C)  Pulse:  [72-95] 80  Resp:  [13-19] 17  BP: (112-148)/() 124/87  SpO2:  [94 %-96 %] 94 %      Neurologic  Mental Status:  alert, oriented x 3, follows commands, speech clear and fluent, naming and repetition normal  Cranial Nerves:  visual fields intact, EOMI with normal smooth pursuit, facial sensation intact and symmetric, facial movements symmetric, hearing not formally tested but intact to conversation, no dysarthria  Motor:  normal muscle tone and bulk, no abnormal movements, able to move all limbs spontaneously, strength 5/5 throughout upper and lower extremities, no pronator drift  Sensory:  light touch sensation intact and symmetric throughout upper and lower extremities, no extinction on double simultaneous stimulation   Coordination:  normal finger-to-nose and heel-to-shin bilaterally without dysmetria, rapid alternating movements symmetric  Station/Gait:  deferred    Imaging  I personally reviewed all imaging; relevant findings per HPI.     Lab Results Data   CBC  Recent Labs   Lab 08/01/23  0856 07/31/23  1239   WBC 6.5 6.2   RBC 4.94 5.18   HGB 15.5 16.1   HCT 45.6 47.9    228     Basic  Metabolic Panel    Recent Labs   Lab 08/02/23  0741 08/01/23  2137 08/01/23  1706 08/01/23  1141 08/01/23  0856 07/31/23 2011 07/31/23  1239   NA  --   --   --   --  136  --  139   POTASSIUM  --   --   --   --  4.2  --  4.3   CHLORIDE  --   --   --   --  101  --  102   CO2  --   --   --   --  22  --  25   BUN  --   --   --   --  15.6  --  12.6   CR  --   --   --   --  1.03  --  1.09   * 129* 106*   < > 117*   < > 107*   JEANETH  --   --   --   --  9.3  --  9.8    < > = values in this interval not displayed.     Liver Panel  No results for input(s): PROTTOTAL, ALBUMIN, BILITOTAL, ALKPHOS, AST, ALT, BILIDIRECT in the last 168 hours.  INR  No lab results found.   Lipid Profile    Recent Labs   Lab Test 07/31/23  1239   CHOL 287*   HDL 60   *   TRIG 128     A1C    Recent Labs   Lab Test 07/31/23  1239   A1C 5.5     Troponin    Recent Labs   Lab 07/31/23 2035 07/31/23  1239   CTROPT 7 7

## 2023-08-02 NOTE — PLAN OF CARE
Goal Outcome Evaluation:  Plan of Care Reviewed With: patient, spouse    Overall Patient Progress: improvingOverall Patient Progress: improving    Pt here L vertebral artery dissection, L CVA. Pt A&O x4, VSS, on RA. LS clear. Tele NSR. CMS and Neuro's intact. Denies pain, discomfort posterior neck, declined pain med, ice pack applied. Up Independently. +BS, last BM yesterday. Voiding> reg diet, take pills whole. Repeat CT stable. Discharging to home today with OP PT. Discharge follow-up care and med teaching given.

## 2023-08-02 NOTE — PLAN OF CARE
Physical Therapy Discharge Summary    Reason for therapy discharge:    Discharged to home with outpatient therapy.    Progress towards therapy goal(s). See goals on Care Plan in [facility] electronic health record for goal details.  Goals met    Therapy recommendation(s):    Continued therapy is recommended.  Rationale/Recommendations:  Recommend continued skilled PT services via OP PT to address vestibular deficits and balance, and improve IND with safety and functional mobility.

## 2023-08-02 NOTE — DISCHARGE SUMMARY
[facility]  Hospitalist Discharge Summary      Date of Admission:  7/31/2023  Date of Discharge:  8/2/2023  Discharging Provider: Bree Luque MD  Discharge Service: Hospitalist Service    Discharge Diagnoses     Posterior circulation stroke  Vertebral artery dissection  Hyperlipidemia  Hypertension  Obstructive sleep apnea    Clinically Significant Risk Factors          Follow-ups Needed After Discharge   Follow-up Appointments     Follow-up and recommended labs and tests       Follow up with primary care provider, Deejay De Los Santos, within 7 days   for hospital follow- up.  The following labs/tests are recommended: cbc .      Follow with stroke neurology as outpatient        {Additional follow-up instructions/to-do's for PCP        Unresulted Labs Ordered in the Past 30 Days of this Admission       Date and Time Order Name Status Description    8/1/2023  5:01 PM Research Kit Collection In process         These results will be followed up by   Discharge Disposition   Discharged to home  Condition at discharge: Stable    Hospital Course     This is a 53-year-old male with medical history which includes hypertension, obstructive sleep apnea who presented to the hospital with a chief complaint that he has been having dizziness, headache and unsteady gait about 3 days prior to presentation to the hospital.  Initially he thought it was due to his alcohol use and patient was evaluated in the ED and had CBC done which was unremarkable.  Basic metabolic panel was also normal.  He was found to have elevated total cholesterol of 287, HDL of 60, LDL of 201, non-HDL of 227 and triglycerides of 128.  And his troponin x2 were negative.  HbA1c was found to be 5.5.        Ultrasound of the lower extremities bilaterally did not show any evidence of DVT     MRI of the brain was done which showed large acute versus early subacute infarct involving the left PICA territory.  Moderate edema-like signal is  seen associated with infarcted tissue resulting in slight mass effect of the fourth ventricle and no evidence of any hydrocephalus.  No evidence of any hemorrhagic conversion.  Small remote lacunar infarct versus dilated perivascular space in the right basal ganglia.     MRA of the neck was done which showed constellation of findings suspicious for left vertebral artery dissection.  Unremarkable MRA appearance of the right vertebral artery and carotid arteries. MRA of the brain was done which showed absence of normal flow related signal within the left vertebral artery intradural segment likely related to dissection and unremarkable MRI of the anterior circulationEKG was done which showed normal sinus rhythm.    Stroke neurology was consulted and patient was started on aspirin 81 mg, HbA1c was normal at 5.5 and his lipid panel was done and showedtotal cholesterol of 287, HDL of 60, LDL of 201, non-HDL of 227 and triglycerides of 128 and patient was started on atorvastatin 80 mg daily.  Echocardiogram was done which showed EF of 60 to 65%, no wall motion changes and no valvular disease and grade 1 or early diastolic dysfunction.  Patient was seen by physical therapy, Occupational Therapy and he had no issues with any speech and was tolerating diet.    Patient was monitored on the hospital in the neuro floor and he did participate in research study voluntarily and on day of discharge repeat CT scan of the head was done which was unchanged and the patient is at baseline mentation and on day of discharge he is near baseline.  He had questions about work activity which was explained to him by the stroke neuro team and he will be careful with driving for the first 2 weeks and then in the meantime will be seen by PCP.  Patient as outpatient will be continued with aspirin 81 mg daily along with atorvastatin 80 mg daily and he was loaded with Plavix 300 mg in the hospital and will further take 75 mg of Plavix daily for total  of 16 days as outpatient and meds were sent to [place] [facility].    He will need to follow-up with stroke neurology as outpatient and the neurology team have ordered outpatient CT scan of the head and CTA head and neck and he will need to follow as outpatient with primary care physician and on day of discharge Case management team was notified and they will help him with his appointment.  On day of discharge patient and his wife Rolanda were in agreement with going home  We will continue to take his PTA blood pressure medications and outpatient referral for physical therapy has been placed       Consultations This Hospital Stay   PATIENT LEARNING CENTER IP CONSULT  NEUROLOGY IP STROKE CONSULT  SPEECH LANGUAGE PATH ADULT IP CONSULT  PHARMACY IP CONSULT  PHARMACY IP CONSULT  PHARMACY IP CONSULT  PHYSICAL THERAPY ADULT IP CONSULT  OCCUPATIONAL THERAPY ADULT IP CONSULT  REHAB ADMISSIONS LIAISON IP CONSULT  CARE MANAGEMENT / SOCIAL WORK IP CONSULT  SMOKING CESSATION PROGRAM IP CONSULT    Code Status   Full Code    Time Spent on this Encounter   IBree MD, personally saw the patient today and spent greater than 30 minutes discharging this patient.       Bree Luque MD  [facility] NEUROSCIENCE UNIT  [address] [place] [address]  Phone: [phone]  ______________________________________________________________________    Physical Exam   Vital Signs: Temp: (!) 95.5  F (35.3  C) Temp src: Oral BP: 134/86 Pulse: 85   Resp: 18 SpO2: 95 % O2 Device: None (Room air)    Weight: 220 lbs 7.36 oz    General: Patient appears comfortable and in no acute distress.  Respiratory: Lungs are clear to auscultation bilaterally with no wheeze or crackles   Cardiovascular: Regular rate , S1 and S2 normal with no murmer or rubs or gallops  Abdomen:   soft ,  Neurologic: Higher functions are within normal limits. No obvious defects in speech, language and memory. No facial droop  Musculoskeletal: Normal  Range of motion over upper and lower extremities bilaterally   Psychiatric: cooperative            Primary Care Physician   Deejay De Los Santos    Discharge Orders      CT Head w/o contrast*     CTA Head Neck with Contrast     Physical Therapy Referral      Reason for your hospital stay    stroke     Follow-up and recommended labs and tests     Follow up with primary care provider, Deejay De Los Santos, within 7 days for hospital follow- up.  The following labs/tests are recommended: cbc .    Follow with stroke neurology as outpatient     Activity    Your activity upon discharge: activity as tolerated     Diet    Follow this diet upon discharge: Orders Placed This Encounter      Regular Diet Adult     Stroke Hospital Follow Up (for neurologist use only)    jslyhl will call you to coordinate care as prescribed by your provider. If you don t hear from a representative within 2 business days, please call [phone].         Significant Results and Procedures   Most Recent 3 CBC's:  Recent Labs   Lab Test 08/01/23  0856 07/31/23  1239 01/11/19  1938   WBC 6.5 6.2 7.6   HGB 15.5 16.1 15.2   MCV 92 93 90    228 236     Most Recent 3 BMP's:  Recent Labs   Lab Test 08/02/23  0741 08/01/23  2137 08/01/23  1706 08/01/23  1141 08/01/23  0856 07/31/23 2011 07/31/23  1239 03/13/19  1339   0000   NA  --   --   --   --  136  --  139 137  --    POTASSIUM  --   --   --   --  4.2  --  4.3 4.4  --    CHLORIDE  --   --   --   --  101  --  102 105  --    CO2  --   --   --   --  22  --  25 27  --    BUN  --   --   --   --  15.6  --  12.6 23  --    CR  --   --   --   --  1.03  --  1.09 1.16  --    ANIONGAP  --   --   --   --  13  --  12 5  --    JEANETH  --   --   --   --  9.3  --  9.8 9.4  --    * 129* 106*   < > 117*   < > 107* 96   < >    < > = values in this interval not displayed.     Most Recent 2 LFT's:  Recent Labs   Lab Test 03/13/19  1339   AST 28   ALT 36   ALKPHOS 87   BILITOTAL 0.6     Most Recent 3  INR's:No lab results found.,   Results for orders placed or performed during the hospital encounter of 07/31/23   MR Brain w/o & w Contrast     Value    Radiologist flags Large acute versus early subacute left PICA (AA)    Narrative    EXAM: MR BRAIN W/O & W CONTRAST  7/31/2023 3:44 PM     HISTORY: Headache, posterior neck pain, vertigo       COMPARISON: None.    TECHNIQUE: Multiplanar, multisequence MR imaging of the head obtained  prior to, and after, intravenous contrast administration    CONTRAST: 10 mL Gadavist.    FINDINGS:    Calvarium/skull base: No focal marrow replacing lesion. Small  bilateral mastoid effusions, nonspecific.    Orbits: Within normal limits accounting for technique.    Paranasal sinuses: No substantial paranasal sinus disease.    Brain: Large confluent area of restricted diffusion centered within  the inferior aspect of the left cerebellar hemisphere with involvement  of the cerebellar tonsil. An additional tiny focus of restricted  diffusion is seen along the left dorsolateral aspect of the medulla  (series 5/image 51). Moderate edema signal associated with the  infarcted tissue exerts slight mass effect on the fourth ventricle. No  evidence of hydrocephalus. No evidence of acute intracranial  hemorrhage. Basal cisterns are patent. Normal positioning and  morphology of the cerebellar tonsils. Small remote lacunar infarct  versus dilated perivascular space in the right basal ganglia. No  substantial background of white matter disease for patient's age.  Normal flow related signal within the major intracranial venous  structures. Brain MRA is reported separately. No abnormal enhancing  intracranial lesions.      Impression    IMPRESSION:    1.  Large acute versus early subacute infarct involving the left PICA  territory as described above. Moderate edema-like signal is seen  associated with the infarcted tissue resulting in slight mass effect  on the fourth ventricle. No evidence of  hydrocephalus at this time.  2.  No evidence of hemorrhagic conversion.  3.  Small remote lacunar infarct versus dilated perivascular space in  the right basal ganglia.  4.  Head and neck MRA reported separately.    [Critical Result: Large acute versus early subacute left PICA  territory infarct.]    Finding was identified on 7/31/2023 3:25 PM.     Dr. Artis was contacted by Dr. Bishop on 7/31/2023 at 3:30 PM  and verbalized understanding of the critical result.     PAIGE BISHOP MD         SYSTEM ID:  [id number]   MRA Brain ([place] [facility] [place]) wo Contrast     Value    Radiologist flags Absence of normal flow-related signal within the (AA)    Narrative    EXAM MRA BRAIN ([place] [facility] [place]) W/O CONTRAST 7/31/2023 3:02 PM    HISTORY: Headache, posterior neck pain, vertigo    COMPARISON: None.    TECHNIQUE: Using a 3D time-of-flight image acquisition technique, MRA  of the major arteries at the base of the brain was obtained without  intravenous contrast. Three-dimensional reconstructions of the head  MRA were created, which were reviewed by the radiologist.    FINDINGS:     Anterior circulation: No high-grade stenosis or occlusion. No aneurysm  or evidence of high flow vascular malformation.    Posterior circulation: Absence of normal flow-related signal within  the left vertebral artery intradural segment, likely related to  dissection in correlation with findings on contemporaneous neck MRA.  Normal flow-related signal within the right vertebral artery  intradural segment and basilar artery. No aneurysm. Right fetal like  PCA.      Impression    IMPRESSION:    1.  Absence of normal flow-related signal within the left vertebral  artery intradural segment, likely related to dissection in correlation  with contemporaneous neck MRA.  2.  Unremarkable MRA of the anterior circulation.    [Critical Result: Absence of normal flow-related signal within the  left vertebral artery intradural segment.]    Finding was  identified on 7/31/2023 3:25 PM.     Dr. Artis was contacted by Dr. Bishop on 7/31/2023 at 3:30 PM  and verbalized understanding of the critical result.     PAIGE BISHOP MD         SYSTEM ID:  [id number]   MRA Neck (Carotids) wo & w Contrast     Value    Radiologist flags (AA)     Suspicion for left vertebral artery dissection with    Narrative    EXAM: MRA NECK (CAROTIDS) W/O & W CONTRAST  7/31/2023 3:45 PM     HISTORY: Headache, posterior neck pain, vertigo       COMPARISON: None.    TECHNIQUE: Neck MRA: Limited non contrast 2DTOF images were obtained  of the mid-cervical region. Following intravenous gadolinium-based  contrast administration, a contrast enhanced MRA of the neck/cervical  vessels was performed.  Three-dimensional reconstructions of the neck and head MRA were  created, which were reviewed by the radiologist.    CONTRAST: 10 mL Gadavist.    FINDINGS:    Aortic arch: Normal configuration. No high-grade stenosis or occlusion  involving the origins of the major branch vessels.    Carotid arteries: No substantial stenosis or occlusion. No evidence of  dissection.    Vertebral arteries: The left vertebral artery is faintly opacified at  its origin and through the V1 segment. However, the left vertebral  artery V2 segment becomes nonopacified with asymmetric areas of  intrinsically T1 hyperintense signal abnormality associated with the  vessel wall (for example series 10/images 23, 27, and 34). The left  vertebral artery remains non-opacified through the V3 and V4 segments  on 2D time-of-flight sequence. Faint opacification of the left V4  segment on post contrast sequence is likely from retrograde filling.  No high-grade stenosis, occlusion, or evidence of dissection involving  the right vertebral artery.    No focal extraspinal abnormality identified on limited evaluation.      Impression    IMPRESSION:     1.  Constellation of findings suspicious for left vertebral artery  dissection as  described above. Catheter directed angiography could  further evaluate if clinically indicated.  2.  Unremarkable MRA appearance of the right vertebral artery and  carotid arteries.    [Critical Result: Suspicion for left vertebral artery dissection with  recent left PICA infarct.]    Finding was identified on 7/31/2023 3:25 PM.     Dr. Artsi was contacted by Dr. Bishop on 7/31/2023 at 3:30 PM  and verbalized understanding of the critical result.     PAIGE BISHOP MD         SYSTEM ID:  [id number]   CT Head w/o Contrast    Narrative    EXAM: CT HEAD W/O CONTRAST  LOCATION: [facility]  DATE: 8/2/2023    INDICATION: Follow up left cerebellar stroke  COMPARISON: MRI brain 07/31/2023  TECHNIQUE: Routine CT Head without IV contrast. Multiplanar reformats. Dose reduction techniques were used.    FINDINGS:  INTRACRANIAL CONTENTS: No significant change in evolving subacute infarction in the inferomedial left cerebellar hemisphere, with associated mass effect again produces local sulcal effacement and partial effacement of the fourth ventricle. Stable normal   supratentorial ventricular caliber, without evidence for developing acute obstructive hydrocephalus. No acute intracranial hemorrhage. No CT evidence of interval acute infarct elsewhere. Punctate chronic lacunar infarction in the right putamen. Otherwise   normal parenchymal attenuation.      VISUALIZED ORBITS/SINUSES/MASTOIDS: No intraorbital abnormality. No paranasal sinus mucosal disease. No middle ear or mastoid effusion.    BONES/SOFT TISSUES: No acute abnormality.        Impression    IMPRESSION:  1.  No significant change in evolving subacute infarction in the inferomedial left cerebellar hemisphere. No hemorrhagic transformation.  2.  Associated mass effect is also unchanged, with unchanged partial effacement of the fourth ventricle. No supratentorial ventriculomegaly to suggest developing acute obstructive hydrocephalus.  3.   No interval acute intracranial abnormality.   Echocardiogram Complete w Bubble Study - For age < 60 yrs    Narrative    [id number]  [id number]  [id number]  [id number]^INK^JUSTUS     [facility]  Echocardiography Laboratory  [address] [place] [id number]     Name: SANDIE AGUAYO  MRN: [id number]  : 1970  Study Date: 2023 01:17 PM  Age: 53 yrs  Gender: Male  Patient Location: [facility]  Reason For Study: Cerebrovascular Incident  Ordering Physician: JUSTUS ZARAGOZA  Referring Physician: Deejay De Los Santos  Performed By: Glory Corea RDCS     BSA: 2.3 m2  Height: 75 in  Weight: 230 lb  HR: 80  BP: 134/85 mmHg  ______________________________________________________________________________  Procedure  Complete Portable Echo Adult. Optison (NDC #[id number]) given intravenously.  ______________________________________________________________________________  Interpretation Summary     1. Normal left ventricular size and function. Left ventricular ejection  fraction of 60-65%.  2. No segmental wall motion abnormalities noted.  3. No hemodynamically significant valvular disease.  Compared to the previous echocardiogram, there are no significant changes.  ______________________________________________________________________________  Left Ventricle  Grade I or early diastolic dysfunction.     Right Ventricle  The right ventricle is mildly dilated. The right ventricular systolic function  is normal.     Atria  Normal left atrial size. Right atrial size is normal.     Mitral Valve  The mitral valve leaflets appear normal. There is no evidence of stenosis,  fluttering, or prolapse. There is trace mitral regurgitation. There is no  mitral valve stenosis.     Aortic Valve  Normal tricuspid aortic valve. No aortic regurgitation is present. No aortic  stenosis is present.     Pulmonic Valve  The pulmonic valve is not well seen, but is grossly normal.     Vessels  Normal size aorta.  Normal size ascending aorta. IVC diameter <2.1 cm  collapsing >50% with sniff suggests a normal RA pressure of 3 mmHg.     Pericardium  There is no pericardial effusion.     Rhythm  Sinus rhythm was noted.     ______________________________________________________________________________  MMode/2D Measurements & Calculations  IVSd: 0.82 cm  LVIDd: 5.2 cm  LVIDs: 2.9 cm  LVPWd: 0.92 cm  FS: 43.7 %  LV mass(C)d: 158.7 grams  LV mass(C)dI: 68.2 grams/m2     Ao root diam: 3.3 cm  LA dimension: 4.1 cm  asc Aorta Diam: 3.5 cm  LA/Ao: 1.2  LVOT diam: 2.1 cm  LVOT area: 3.5 cm2  RWT: 0.36     Doppler Measurements & Calculations  MV E max lalo: 57.8 cm/sec  MV A max lalo: 68.1 cm/sec  MV E/A: 0.85  MV dec time: 0.24 sec  LV V1 max P.1 mmHg  LV V1 max: 113.0 cm/sec  LV V1 VTI: 19.8 cm  SV(LVOT): 68.6 ml  SI(LVOT): 29.4 ml/m2  E/E' av.5  Lateral E/e': 6.2  Medial E/e': 6.8  RV S Lalo: 12.2 cm/sec     ______________________________________________________________________________  Report approved by: Carey Mas 2023 03:33 PM             Discharge Medications   Current Discharge Medication List        START taking these medications    Details   aspirin (ASA) 81 MG chewable tablet Take 1 tablet (81 mg) by mouth daily  Qty: 30 tablet, Refills: 3    Associated Diagnoses: Posterior circulation stroke (H)      atorvastatin (LIPITOR) 80 MG tablet Take 1 tablet (80 mg) by mouth every evening  Qty: 30 tablet, Refills: 3    Associated Diagnoses: Posterior circulation stroke (H)      clopidogrel (PLAVIX) 75 MG tablet Take 1 tablet (75 mg) by mouth daily  Qty: 16 tablet, Refills: 0    Associated Diagnoses: Posterior circulation stroke (H)           CONTINUE these medications which have NOT CHANGED    Details   amLODIPine (NORVASC) 5 MG tablet TAKE 1 TABLET BY MOUTH EVERY DAY  Qty: 90 tablet, Refills: 0    Associated Diagnoses: Benign essential hypertension      lisinopril (ZESTRIL) 20 MG tablet TAKE 1 TABLET BY MOUTH  EVERY DAY  Qty: 90 tablet, Refills: 3    Associated Diagnoses: Benign essential hypertension           Allergies   Allergies   Allergen Reactions    Penicillins Hives    Trazodone Hcl      dizzy

## 2023-08-02 NOTE — PLAN OF CARE
Pt here with L vertebral artery dissection, L CVA. A&Ox4. Neuros intact. VSS on RA, permissive HTN orders. Wears home CPAP overnight. Tele NSR. Regular diet, thin liquids. Takes pills whole. Up independently. Ice packs applied to neck for mild pain. PIV SL. Pt scoring green on the Aggression Stop Light Tool. CT completed this AM, results pending. Discharge pending, possibly today.

## 2023-08-03 ENCOUNTER — PATIENT OUTREACH (OUTPATIENT)
Dept: NURSING | Facility: CLINIC | Age: 53
End: 2023-08-03
Payer: COMMERCIAL

## 2023-08-03 ASSESSMENT — ACTIVITIES OF DAILY LIVING (ADL): DEPENDENT_IADLS:: TRANSPORTATION

## 2023-08-03 NOTE — LETTER
[facility] CARE COORDINATION  [address] [place] [id number]    August 3, 2023    Lev Olvera  [address]  [facility] [address]      Dear Lev,    I am a  clinic care coordinator who works with Deejay De Los Santos MD with the [facility]. I wanted to thank you for spending the time to talk with me.  Below is a description of clinic care coordination and how I can further assist you.       The clinic care coordination team is made up of a registered nurse, , financial resource worker and community health worker who understand the health care system. The goal of clinic care coordination is to help you manage your health and improve access to the health care system. Our team works alongside your provider to assist you in determining your health and social needs. We can help you obtain health care and community resources, providing you with necessary information and education. We can work with you through any barriers and develop a care plan that helps coordinate and strengthen the communication between you and your care team.  Our services are voluntary and are offered without charge to you personally.    Please feel free to contact me with any questions or concerns regarding care coordination and what we can offer.      We are focused on providing you with the highest-quality healthcare experience possible.    Sincerely,      Chari Cortés RN, BSN, PHN  Primary Care / Care Coordinator   [facility]  E-mail [email or web address]   [phone]

## 2023-08-03 NOTE — PROGRESS NOTES
"Clinic Care Coordination Contact  Clinic Care Coordination Contact  OUTREACH with Post Discharge Assessment    Referral Information:  Referral Source: IP Report    Primary Diagnosis: Cardiovascular - other (vertebral artery dissection, HTN)    Chief Complaint   Patient presents with    Clinic Care Coordination - Initial    Clinic Care Coordination - Post Hospital      [place] Utilization:   [facility]  Clinic Utilization:  [facility]  Difficulty keeping appointments:: No  Compliance Concerns: No  No-Show Concerns: No  No PCP office visit in Past Year: No  Utilization      Hospital Admissions  1             ED Visits  1             No Show Count (past year)  0                    Current as of: 8/3/2023  9:40 AM              Clinical Concerns:  Current Medical Concerns:  Recent discharge from hospital    Current Behavioral Concerns: None    Education Provided to patient:   RNCC called and spoke with patient/caregiver; introduced self, discussed role of Care Coordination and explained reason for call   Pain  Pain (GOAL):: No  Health Maintenance Reviewed: Due/Overdue   Health Maintenance Due   Topic Date Due    ADVANCE CARE PLANNING  Never done    HEPATITIS B IMMUNIZATION (1 of 3 - 3-dose series) Never done    COLORECTAL CANCER SCREENING  Never done    HIV SCREENING  Never done    HEPATITIS C SCREENING  Never done    YEARLY PREVENTIVE VISIT  03/28/2006    ZOSTER IMMUNIZATION (1 of 2) Never done    COVID-19 Vaccine (4 - Moderna series) 02/16/2022     Clinical Pathway: None    Admission:    Admission Date: 07/31/23   Reason for Admission: vertebral artery dissection, HTN  Discharge:   Discharge Date: 08/02/23  Discharge Diagnosis: vertebral artery dissection, HTN    Discharge Assessment  How are you doing now that you are home?: \"I'm glad I'm home\"  How are your symptoms? (Red Flag symptoms escalate to triage hotline per guidelines): Improved  Do you feel your condition " is stable enough to be safe at home until your provider visit?: Yes  Does the patient have their discharge instructions? : Yes  Does the patient have questions regarding their discharge instructions? : No  Were you started on any new medications or were there changes to any of your previous medications? : Yes  Does the patient have all of their medications?: Yes  Do you have questions regarding any of your medications? : No  Do you have all of your needed medical supplies or equipment (DME)?  (i.e. oxygen tank, CPAP, cane, etc.): Yes  Discharge follow-up appointment scheduled within 14 calendar days? : Yes  Discharge Follow Up Appointment Date: 08/07/23  Discharge Follow Up Appointment Scheduled with?: Primary Care Provider    Post-op (Clinicians Only)  Did the patient have surgery or a procedure: No  Fever: No  Chills: No  Eating & Drinking: eating and drinking without complaints/concerns  PO Intake: regular diet  Additional Symptoms:  (Denies)  Bowel Function: normal  Date of last BM: 08/03/23  Urinary Status: voiding without complaint/concerns    Medication Management:  Medication review status: Medications reviewed and no changes reported per patient.        Current Outpatient Medications   Medication    amLODIPine (NORVASC) 5 MG tablet    aspirin (ASA) 81 MG chewable tablet    atorvastatin (LIPITOR) 80 MG tablet    clopidogrel (PLAVIX) 75 MG tablet    lisinopril (ZESTRIL) 20 MG tablet     No current facility-administered medications for this visit.     Functional Status:  Dependent ADLs:: Independent  Dependent IADLs:: Transportation  Bed or wheelchair confined:: No  Mobility Status: Independent    Living Situation:  Current living arrangement:: I live in a private home with family (Cesia, wife and daughter)  Type of residence:: Private home - stairs    Lifestyle & Psychosocial Needs:    Social Determinants of Health     Tobacco Use: Low Risk  (5/11/2023)    Patient History     Smoking Tobacco Use: Never      Smokeless Tobacco Use: Never     Passive Exposure: Never   Alcohol Use: Not on file   Financial Resource Strain: Not on file   Food Insecurity: Not on file   Transportation Needs: Not on file   Physical Activity: Not on file   Stress: Not on file   Social Connections: Not on file   Intimate Partner Violence: Not on file   Depression: Not at risk (5/11/2023)    PHQ-2     PHQ-2 Score: 0   Housing Stability: Not on file     Diet:: Regular  Inadequate nutrition (GOAL):: No  Tube Feeding: No  Inadequate activity/exercise (GOAL):: No  Significant changes in sleep pattern (GOAL): No  Transportation means:: Regular car     Worship or spiritual beliefs that impact treatment:: No  Mental health DX:: No  Mental health management concern (GOAL):: No  Chemical Dependency Status: No Current Concerns  Chemical Dependency Management:  (NA)      Resources and Interventions:  Current Resources:   Community Resources: Other (see comment) (Outpatient PT)  Supplies Currently Used at Home: None  Equipment Currently Used at Home: none  Employment Status: employed full-time (Business owner)     Advance Care Plan/Directive  Advanced Care Plans/Directives on file:: No  Type Advanced Care Plans/Directives:  (Full Code)  Advanced Care Plan/Directive Status: Considering Options    Referrals Placed: None     Future Appointments                In 4 days Deejay De Los Santos MD [facility]            Plan:  -Patient will contact the care team with questions, concerns, support needs   -Patient will use the clinic as a resource and understands (s)he can contact the [facility] with 24/7 after hours services available  -Care Coordinator will remain available as needed  -No further Care Coordination outreaches at this time     Chari Cortés RN, BSN, PHN  Primary Care / Care Coordinator   [facility]  [facility]  E-mail [email or web address]   [phone]

## 2023-08-07 ENCOUNTER — OFFICE VISIT (OUTPATIENT)
Dept: INTERNAL MEDICINE | Facility: CLINIC | Age: 53
End: 2023-08-07
Payer: COMMERCIAL

## 2023-08-07 VITALS
WEIGHT: 232 LBS | OXYGEN SATURATION: 99 % | HEART RATE: 76 BPM | DIASTOLIC BLOOD PRESSURE: 73 MMHG | SYSTOLIC BLOOD PRESSURE: 115 MMHG | TEMPERATURE: 97 F | BODY MASS INDEX: 28.62 KG/M2

## 2023-08-07 DIAGNOSIS — E78.5 HYPERLIPIDEMIA LDL GOAL <70: ICD-10-CM

## 2023-08-07 DIAGNOSIS — I77.74 VERTEBRAL ARTERY DISSECTION (H): Primary | ICD-10-CM

## 2023-08-07 DIAGNOSIS — Z12.11 SCREEN FOR COLON CANCER: ICD-10-CM

## 2023-08-07 LAB
ERYTHROCYTE [DISTWIDTH] IN BLOOD BY AUTOMATED COUNT: 12.3 % (ref 10–15)
HCT VFR BLD AUTO: 45.8 % (ref 40–53)
HGB BLD-MCNC: 15.4 G/DL (ref 13.3–17.7)
MCH RBC QN AUTO: 31 PG (ref 26.5–33)
MCHC RBC AUTO-ENTMCNC: 33.6 G/DL (ref 31.5–36.5)
MCV RBC AUTO: 92 FL (ref 78–100)
PLATELET # BLD AUTO: 241 10E3/UL (ref 150–450)
RBC # BLD AUTO: 4.97 10E6/UL (ref 4.4–5.9)
WBC # BLD AUTO: 6 10E3/UL (ref 4–11)

## 2023-08-07 PROCEDURE — 85027 COMPLETE CBC AUTOMATED: CPT | Performed by: INTERNAL MEDICINE

## 2023-08-07 PROCEDURE — 99495 TRANSJ CARE MGMT MOD F2F 14D: CPT | Performed by: INTERNAL MEDICINE

## 2023-08-07 PROCEDURE — 36415 COLL VENOUS BLD VENIPUNCTURE: CPT | Performed by: INTERNAL MEDICINE

## 2023-08-07 NOTE — PROGRESS NOTES
"  Assessment & Plan     Vertebral artery dissection (H)  Recheck CBC today as recommended.  Continue statin, continue antiplatelet therapy as started in hospital.  Has yet to make stroke neurology follow-up, but has contact information to do so.  - CBC with platelets; Future  - CBC with platelets    Screen for colon cancer    - Colonoscopy Screening  Referral; Future    Hyperlipidemia LDL goal <70  We will plan to recheck lipids in another 2 to 3 months  - Lipid panel reflex to direct LDL Fasting; Future  - Comprehensive metabolic panel; Future           MED REC REQUIRED  Post Medication Reconciliation Status:  Discharge medications reconciled, continue medications without change      Deejay De Los Santos MD  [facility]              Eddie Ohara is a 53 year old, presenting for the following health issues:  ER F/U (fvsd)      8/7/2023     3:09 PM   Additional Questions   Roomed by sandra COTTER         8/3/2023    11:27 AM   Post Discharge Outreach   Admission Date 7/31/2023   Reason for Admission vertebral artery dissection, HTN   Discharge Date 8/2/2023   Discharge Diagnosis vertebral artery dissection, HTN   How are you doing now that you are home? \"I'm glad I'm home\"   How are your symptoms? (Red Flag symptoms escalate to triage hotline per guidelines) Improved   Do you feel your condition is stable enough to be safe at home until your provider visit? Yes   Does the patient have their discharge instructions?  Yes   Does the patient have questions regarding their discharge instructions?  No   Were you started on any new medications or were there changes to any of your previous medications?  Yes   Does the patient have all of their medications? Yes   Do you have questions regarding any of your medications?  No   Do you have all of your needed medical supplies or equipment (DME)?  (i.e. oxygen tank, CPAP, cane, etc.) Yes   Discharge follow-up appointment scheduled " within 14 calendar days?  Yes   Discharge Follow Up Appointment Date 8/7/2023   Discharge Follow Up Appointment Scheduled with? Primary Care Provider     Hospital Follow-up Visit:    Hospital/Nursing Home/IP Rehab Facility: [facility]  Date of Admission: 7/31/23  Date of Discharge: 8/2/23  Reason(s) for Admission: Vertebral artery dissection    Was your hospitalization related to COVID-19? No   Problems taking medications regularly:  None  Medication changes since discharge: None  Problems adhering to non-medication therapy:  None    Summary of hospitalization:  [facility] discharge summary reviewed  Diagnostic Tests/Treatments reviewed.  Follow up needed: none  Other Healthcare Providers Involved in Patient s Care:         Specialist appointment - Stroke neurology  Update since discharge: improved.         Plan of care communicated with patient and family             Discharge summary as follows:    This is a 53-year-old male with medical history which includes hypertension, obstructive sleep apnea who presented to the hospital with a chief complaint that he has been having dizziness, headache and unsteady gait about 3 days prior to presentation to the hospital.  Initially he thought it was due to his alcohol use and patient was evaluated in the ED and had CBC done which was unremarkable.  Basic metabolic panel was also normal.  He was found to have elevated total cholesterol of 287, HDL of 60, LDL of 201, non-HDL of 227 and triglycerides of 128.  And his troponin x2 were negative.  HbA1c was found to be 5.5.        Ultrasound of the lower extremities bilaterally did not show any evidence of DVT     MRI of the brain was done which showed large acute versus early subacute infarct involving the left PICA territory.  Moderate edema-like signal is seen associated with infarcted tissue resulting in slight mass effect of the fourth ventricle and no evidence of any hydrocephalus.   No evidence of any hemorrhagic conversion.  Small remote lacunar infarct versus dilated perivascular space in the right basal ganglia.     MRA of the neck was done which showed constellation of findings suspicious for left vertebral artery dissection.  Unremarkable MRA appearance of the right vertebral artery and carotid arteries. MRA of the brain was done which showed absence of normal flow related signal within the left vertebral artery intradural segment likely related to dissection and unremarkable MRI of the anterior circulationEKG was done which showed normal sinus rhythm.     Stroke neurology was consulted and patient was started on aspirin 81 mg, HbA1c was normal at 5.5 and his lipid panel was done and showedtotal cholesterol of 287, HDL of 60, LDL of 201, non-HDL of 227 and triglycerides of 128 and patient was started on atorvastatin 80 mg daily.  Echocardiogram was done which showed EF of 60 to 65%, no wall motion changes and no valvular disease and grade 1 or early diastolic dysfunction.  Patient was seen by physical therapy, Occupational Therapy and he had no issues with any speech and was tolerating diet.     Patient was monitored on the hospital in the neuro floor and he did participate in research study voluntarily and on day of discharge repeat CT scan of the head was done which was unchanged and the patient is at baseline mentation and on day of discharge he is near baseline.  He had questions about work activity which was explained to him by the stroke neuro team and he will be careful with driving for the first 2 weeks and then in the meantime will be seen by PCP.  Patient as outpatient will be continued with aspirin 81 mg daily along with atorvastatin 80 mg daily and he was loaded with Plavix 300 mg in the hospital and will further take 75 mg of Plavix daily for total of 16 days as outpatient and meds were sent to [facility].     He will need to follow-up with stroke neurology as  outpatient and the neurology team have ordered outpatient CT scan of the head and CTA head and neck and he will need to follow as outpatient with primary care physician and on day of discharge Case management team was notified and they will help him with his appointment.  On day of discharge patient and his wife Rolanda were in agreement with going home  We will continue to take his PTA blood pressure medications and outpatient referral for physical therapy has been placed      Continues to show improvement neurologically, not having any more dizziness, very vague generalized weakness.    Some loose stools and heartburn since the recent medication changes, seems to have been well relieved with over-the-counter antacids.      Review of Systems   Constitutional, HEENT, cardiovascular, pulmonary, gi and gu systems are negative, except as otherwise noted.      Objective    /73 (Cuff Size: Adult Regular)   Pulse 76   Temp 97  F (36.1  C) (Tympanic)   Wt 105.2 kg (232 lb)   SpO2 99%   BMI 28.62 kg/m    Body mass index is 28.62 kg/m .  Physical Exam   GENERAL: healthy, alert and no distress  NECK: no adenopathy, no asymmetry, masses, or scars and thyroid normal to palpation  RESP: lungs clear to auscultation - no rales, rhonchi or wheezes  CV: regular rate and rhythm, normal S1 S2, no S3 or S4, no murmur, click or rub, no peripheral edema and peripheral pulses strong  ABDOMEN: soft, nontender, no hepatosplenomegaly, no masses and bowel sounds normal  MS: no gross musculoskeletal defects noted, no edema

## 2023-08-14 ENCOUNTER — TELEPHONE (OUTPATIENT)
Dept: NEUROLOGY | Facility: CLINIC | Age: 53
End: 2023-08-14
Payer: COMMERCIAL

## 2023-08-14 NOTE — TELEPHONE ENCOUNTER
Left Voicemail (1st Attempt) and Sent Mychart (1st Attempt) for the patient to call back and schedule the following:    Appointment type: CTV  Provider: N/A  Return date: first available  Specialty phone number: [phone]  Additional appointment(s) needed:   Additonal Notes: patient needs to schedule CTA Head Neck with Contrast  and CT Head w/o contrast* prior to 9/19 appointment.   Called left a voice message providing phone numbers to schedule. And also sent My chart message with scheduling information.

## 2023-08-18 ENCOUNTER — TELEPHONE (OUTPATIENT)
Dept: NEUROLOGY | Facility: CLINIC | Age: 53
End: 2023-08-18
Payer: COMMERCIAL

## 2023-08-18 NOTE — TELEPHONE ENCOUNTER
Please contact pt and offer an appt for 9/19 at 8:30 for an in person appt  with Dr Gleason. The spot is on hold. If pt accepts, please schedule and remove hold.  Please send message back to general neurology if pt declines this appt. Thank you.

## 2023-08-18 NOTE — TELEPHONE ENCOUNTER
Called patient No- answer LVM to call back by Monday after noon.  Will try calling back Monday. 8/18/23  NP

## 2023-08-28 ENCOUNTER — MYC MEDICAL ADVICE (OUTPATIENT)
Dept: INTERNAL MEDICINE | Facility: CLINIC | Age: 53
End: 2023-08-28
Payer: COMMERCIAL

## 2023-08-31 ENCOUNTER — MYC MEDICAL ADVICE (OUTPATIENT)
Dept: INTERNAL MEDICINE | Facility: CLINIC | Age: 53
End: 2023-08-31
Payer: COMMERCIAL

## 2023-09-05 ENCOUNTER — ANCILLARY PROCEDURE (OUTPATIENT)
Dept: CT IMAGING | Facility: CLINIC | Age: 53
End: 2023-09-05
Attending: STUDENT IN AN ORGANIZED HEALTH CARE EDUCATION/TRAINING PROGRAM
Payer: COMMERCIAL

## 2023-09-05 DIAGNOSIS — I63.50 POSTERIOR CIRCULATION STROKE (H): ICD-10-CM

## 2023-09-05 DIAGNOSIS — I77.74 VERTEBRAL ARTERY DISSECTION (H): ICD-10-CM

## 2023-09-05 PROCEDURE — 70496 CT ANGIOGRAPHY HEAD: CPT

## 2023-09-05 PROCEDURE — 70450 CT HEAD/BRAIN W/O DYE: CPT | Mod: XU

## 2023-09-05 PROCEDURE — 70498 CT ANGIOGRAPHY NECK: CPT

## 2023-09-05 PROCEDURE — 250N000009 HC RX 250: Performed by: STUDENT IN AN ORGANIZED HEALTH CARE EDUCATION/TRAINING PROGRAM

## 2023-09-05 PROCEDURE — 250N000011 HC RX IP 250 OP 636: Mod: JZ | Performed by: STUDENT IN AN ORGANIZED HEALTH CARE EDUCATION/TRAINING PROGRAM

## 2023-09-05 RX ORDER — IOPAMIDOL 755 MG/ML
67 INJECTION, SOLUTION INTRAVASCULAR ONCE
Status: COMPLETED | OUTPATIENT
Start: 2023-09-05 | End: 2023-09-05

## 2023-09-05 RX ADMIN — SODIUM CHLORIDE 70 ML: 9 INJECTION, SOLUTION INTRAVENOUS at 13:55

## 2023-09-05 RX ADMIN — IOPAMIDOL 67 ML: 755 INJECTION, SOLUTION INTRAVENOUS at 13:55

## 2023-09-06 ENCOUNTER — TELEPHONE (OUTPATIENT)
Dept: INTERNAL MEDICINE | Facility: CLINIC | Age: 53
End: 2023-09-06
Payer: COMMERCIAL

## 2023-09-06 NOTE — TELEPHONE ENCOUNTER
Reason for Call:  Form, our goal is to have forms completed with 72 hours, however, some forms may require a visit or additional information.    Type of letter, form or note:  disability    Who is the form from?:  [employer] (if other please explain)    Where did the form come from: form was faxed in    What clinic location was the form placed at?: Internal Medicine    Where the form was placed:  [employer]    What number is listed as a contact on the form?:        Additional comments: Fax form to [phone]    Call taken on 9/6/2023 at 8:33 AM by Belinda Collier

## 2023-09-08 ENCOUNTER — MYC MEDICAL ADVICE (OUTPATIENT)
Dept: INTERNAL MEDICINE | Facility: CLINIC | Age: 53
End: 2023-09-08
Payer: COMMERCIAL

## 2023-09-11 NOTE — PROGRESS NOTES
Trial: Determinants of Incident Stroke Cognitive Outcomes and Vascular Effects on RecoverY (DISCOVERY trial)     Participant or LAR/Surrogate Prospective Consent     IRB number: [id number]     PI: Kris Wells MD PhD MS      Estimated duration of study: 4 years     On 08/01/23 the patient was screened and consented for the DISCOVERY study by the note author. Permission to approach the patient was granted by Oralia Estes MD. The current IRB approved consent form was reviewed and discussed at length with the patient. This included purpose, research hypothesis, nature of the research, study contacts, risks & benefits, procedures required for screening, enrollment, randomization as well as all required follow up. Confidentiality issues, compensation for injury, and alternative treatments were explained. Patient was informed that participation is voluntary and that refusal to participate will involve no penalty or decrease benefits to which the subject is otherwise entitled. patient had the opportunity to read the entire consent, ask questions, express concerns and was offered sufficient time to consider the research trial. patient was able to clearly state what study participation involved and the associated requirements. All questions and concerns were addressed. The patient signed the consent form, and a copy of the signed consent form was given to patient.     In the opinion of the investigator and prior to consent, inclusion/exclusion criteria were reviewed and met.  [x] YES  [] NO        Amairani Deleon, [facility] DISCOVERY Coordinator [email or web address]

## 2023-09-19 ENCOUNTER — OFFICE VISIT (OUTPATIENT)
Dept: NEUROLOGY | Facility: CLINIC | Age: 53
End: 2023-09-19
Payer: COMMERCIAL

## 2023-09-19 VITALS
SYSTOLIC BLOOD PRESSURE: 130 MMHG | OXYGEN SATURATION: 99 % | RESPIRATION RATE: 16 BRPM | HEART RATE: 66 BPM | DIASTOLIC BLOOD PRESSURE: 77 MMHG

## 2023-09-19 DIAGNOSIS — I77.74 VERTEBRAL ARTERY DISSECTION (H): Primary | ICD-10-CM

## 2023-09-19 PROCEDURE — 99214 OFFICE O/P EST MOD 30 MIN: CPT | Performed by: PSYCHIATRY & NEUROLOGY

## 2023-09-19 ASSESSMENT — PAIN SCALES - GENERAL: PAINLEVEL: NO PAIN (0)

## 2023-09-19 NOTE — LETTER
9/19/2023       RE: Lev Olvera  [address]  [facility] [address]     Dear Colleague,    Thank you for referring your patient, Lev Olvera, to the [facility] NEUROLOGY CLINIC [place] at [facility]. Please see a copy of my visit note below.            Lev Olvera MRN# [id number]   Age: 53 year old YOB: 1970     Requesting physician: No ref. provider found  Deejay De Los Santos            Assessment and Plan:     (I77.74) Vertebral artery dissection (H)  (primary encounter diagnosis)  Comment: Recovering well from the dissection. I reviewed the images at the time of admission and the more recent images of CT head and CTA head and neck with the patient. His dissection is healing the left vertebral artery is still significantly affected with minimal blood flow.     We reviewed the pathophysiology and mechanism of injury. Advised that when tired or run down he may experience more symptoms. He was counseled to avoid activities such chiropractic neck adjustments, roller coasters etc. He has some balance deficits so he was also counseled to avoid ladders. I think he can return to work with no restrictions but reviewed sensible lifting practices.     He knows to return to the ED if he notes new neurological symptoms. He should contact me for new headaches or worsening numbness in the feet.     He notes some decreased libido. If it continues use of Viagra could be tried after 6 months post dissection.    Stroke risk factor modification reviewed. He understand he will work with Dr. De Los Santos moving forward to preventive further strokes. This includes:  ASA 81 mg daily lifelong  LDL goal 40-70 currently on atorvastatin.  SBP goal < 140, DBP < 90, on lisinopril and amlodipine  Monitoring for diabetes in future per PCP recs.   Avoidance of tobacco products  Healthy diet and exercise  Moderate alcohol    Follow up with me as needed.      Kalpana Gleason MD           History of Present Illness:     chief complaint: Hospital follow up     Lev Olvera is a 53 year old patient presenting for follow up after recent hospitalization for a vertebral artery dissection ad posterior circulation stroke. The patient was admitted on 7/31/23 after experiencing several days of dizziness and incoordination. Imaging revealed a left vertebral artery dissection with no flow in the distal left vertebral artery and a left PICA territory stroke.     Symptoms started as a knot in the back of the neck/head. The next day he didn't feel great and the following day when he woke up he felt badly. When he tried to get up he felt weak on his feet. The following day felt better went on a boat ride and he felt exhausted. The following day within two hours he came home from work feeling sick.     Since the hospital stay he feels he is doing better. No dizziness. He still has some balance issues he estimates he is 98% recovered.  In the middle of his leg he feels tingling up the leg when he touches the right foot.     Neck pain is better, no headaches.     He is taking Atorvastatin. He notes some EGD after taking it. Tums helps.   Using two antihypertensives. Amlodipine and lisinopril  ASA 81 mg a day. Tokk Plavix for 16 days after hospital discharge    He is going in for work. 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          Physical Exam:     /77   Pulse 66   Resp 16   SpO2 99%   Gen: Awake, alert, no acute distress  Neuro:  No aphasia or dysarthria. CN 2-12 intact  Normal muscle bulk, tone and strength x 4   Finger to nose and heel knee shin  Finger taps normal  Normal walking, tandem slight hesitation but stable. Rigo to walk on toes and heels.   Sensation to vibration normal in lower ext.   Temperature reduced in the  medial right leg into the foot.            Pertinent history/data     Past Medical History:  Hyperlipidemia      Component      Latest Ref Rng 7/31/2023  12:39 PM 7/31/2023  8:35 PM   Cholesterol      <200 mg/dL 287 (H)     Triglycerides      <150 mg/dL 128     HDL Cholesterol      >=40 mg/dL 60     LDL Cholesterol Calculated      <=100 mg/dL 201 (H)     Non HDL Cholesterol      <130 mg/dL 227 (H)     Hemoglobin A1C      <5.7 % 5.5     Troponin T, High Sensitivity      <=22 ng/L  7       Legend:  (H) High    EXAM: CTA HEAD NECK W CONTRAST  LOCATION: [facility]  DATE: 9/5/2023     INDICATION: Left cerebellar stroke, left vert dissection.  COMPARISON: Head CT 09/05/2023 at 1356 hours, brain MR 07/31/2023 at 1435 hours, MRA [place] and neck vessels examination 07/31/2023.                                                                  IMPRESSION:      HEAD CTA:   1.  Peripheral branch arborization pattern of the RADHA, MCA and PCA vascular territory is satisfactory. No aneurysm or large vessel occlusion. Diminished enhancement and caliber left V3 and V4 segment is stable in appearance from MRA 07/31/2023. Evolving   infarction left cerebellar hemisphere.     NECK CTA:  1.  Stable appearance from MRA evaluation 07/31/2023. There is absent intravascular enhancement within the left V1, V2 and proximal V3 segment. Enhancement is present within reduced caliber distal left V3 and in the V4 segment present. This suggests left   vertebral artery arterial occlusion or proximal left vertebral arterial level dissection as before. No additional hemodynamic stenosis. The right vertebral artery supplies the basilar artery. Evolving infarction within the left cerebellar hemisphere   redemonstrated with no evidence for hemorrhagic transformation on today's study.    EXAM: CT HEAD W/O CONTRAST  LOCATION: [facility]  DATE: 9/5/2023     INDICATION: L cerebellar stroke, L vert  dissection.  COMPARISON: CT brain 08/02/2023. MRI brain 07/31/2023.                                                                     IMPRESSION:  1.  Left cerebellar infarct as seen on prior imaging. No finding for new infarct, hemorrhage, or mass.          EXAM: MR BRAIN W/O & W CONTRAST  7/31/2023 3:44 PM                                                                     IMPRESSION:     1.  Large acute versus early subacute infarct involving the left PICA territory as described above. Moderate edema-like signal is seen associated with the infarcted tissue resulting in slight mass effect  on the fourth ventricle. No evidence of hydrocephalus at this time.  2.  No evidence of hemorrhagic conversion.  3.  Small remote lacunar infarct versus dilated perivascular space in the right basal ganglia.  4.  Head and neck MRA reported separately.     [Critical Result: Large acute versus early subacute left PICA  territory infarct.]     Finding was identified on 7/31/2023 3:25 PM.      Dr. Artis was contacted by Dr. Bishop on 7/31/2023 at 3:30 PM  and verbalized understanding of the critical result.      PAIGE BISHOP MD     EXAM: MRA NECK (CAROTIDS) W/O & W CONTRAST  7/31/2023 3:45 PM      HISTORY: Headache, posterior neck pain, vertigo                                                                           IMPRESSION:      1.  Constellation of findings suspicious for left vertebral artery dissection as described above. Catheter directed angiography could  further evaluate if clinically indicated.  2.  Unremarkable MRA appearance of the right vertebral artery and carotid arteries.     [Critical Result: Suspicion for left vertebral artery dissection with  recent left PICA infarct.]     Finding was identified on 7/31/2023 3:25 PM.      Dr. Artis was contacted by Dr. Bishop on 7/31/2023 at 3:30 PM and verbalized understanding of the critical result.      PAIGE BISHOP MD          Again, thank you for  allowing me to participate in the care of your patient.      Sincerely,    Kalpana Gleason MD

## 2023-09-19 NOTE — LETTER
9/19/2023       RE: Lev Olvera  [address]  [facility] [address]     Dear Colleague,    Thank you for referring your patient, Lev Olvera, to the [facility] NEUROLOGY CLINIC [place] at [facility]. Please see a copy of my visit note below.            Lev Olvera MRN# [id number]   Age: 53 year old YOB: 1970     Requesting physician: No ref. provider found  Deejay De Los Santos            Assessment and Plan:     (I77.74) Vertebral artery dissection (H)  (primary encounter diagnosis)  Comment: Recovering well from the dissection. I reviewed the images at the time of admission and the more recent images of CT head and CTA head and neck with the patient. His dissection is healing the left vertebral artery is still significantly affected with minimal blood flow.     We reviewed the pathophysiology and mechanism of injury. Advised that when tired or run down he may experience more symptoms. He was counseled to avoid activities such chiropractic neck adjustments, roller coasters etc. He has some balance deficits so he was also counseled to avoid ladders. I think he can return to work with no restrictions but reviewed sensible lifting practices.     He knows to return to the ED if he notes new neurological symptoms. He should contact me for new headaches or worsening numbness in the feet.     He notes some decreased libido. If it continues use of Viagra could be tried after 6 months post dissection.    Stroke risk factor modification reviewed. He understand he will work with Dr. De Los Santos moving forward to preventive further strokes. This includes:  ASA 81 mg daily lifelong  LDL goal 40-70 currently on atorvastatin.  SBP goal < 140, DBP < 90, on lisinopril and amlodipine  Monitoring for diabetes in future per PCP recs.   Avoidance of tobacco products  Healthy diet and exercise  Moderate alcohol    Follow up with me as needed.      Kalpana Gleason MD           History of Present Illness:     chief complaint: Hospital follow up     Lev Olvera is a 53 year old patient presenting for follow up after recent hospitalization for a vertebral artery dissection ad posterior circulation stroke. The patient was admitted on 7/31/23 after experiencing several days of dizziness and incoordination. Imaging revealed a left vertebral artery dissection with no flow in the distal left vertebral artery and a left PICA territory stroke.     Symptoms started as a knot in the back of the neck/head. The next day he didn't feel great and the following day when he woke up he felt badly. When he tried to get up he felt weak on his feet. The following day felt better went on a boat ride and he felt exhausted. The following day within two hours he came home from work feeling sick.     Since the hospital stay he feels he is doing better. No dizziness. He still has some balance issues he estimates he is 98% recovered.  In the middle of his leg he feels tingling up the leg when he touches the right foot.     Neck pain is better, no headaches.     He is taking Atorvastatin. He notes some EGD after taking it. Tums helps.   Using two antihypertensives. Amlodipine and lisinopril  ASA 81 mg a day. Tokk Plavix for 16 days after hospital discharge    He is going in for work. 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          Physical Exam:     /77   Pulse 66   Resp 16   SpO2 99%   Gen: Awake, alert, no acute distress  Neuro:  No aphasia or dysarthria. CN 2-12 intact  Normal muscle bulk, tone and strength x 4   Finger to nose and heel knee shin  Finger taps normal  Normal walking, tandem slight hesitation but stable. Rigo to walk on toes and heels.   Sensation to vibration normal in lower ext.   Temperature reduced in the  medial right leg into the foot.            Pertinent history/data     Past Medical History:  Hyperlipidemia      Component      Latest Ref Rng 7/31/2023  12:39 PM 7/31/2023  8:35 PM   Cholesterol      <200 mg/dL 287 (H)     Triglycerides      <150 mg/dL 128     HDL Cholesterol      >=40 mg/dL 60     LDL Cholesterol Calculated      <=100 mg/dL 201 (H)     Non HDL Cholesterol      <130 mg/dL 227 (H)     Hemoglobin A1C      <5.7 % 5.5     Troponin T, High Sensitivity      <=22 ng/L  7       Legend:  (H) High    EXAM: CTA HEAD NECK W CONTRAST  LOCATION: [facility]  DATE: 9/5/2023     INDICATION: Left cerebellar stroke, left vert dissection.  COMPARISON: Head CT 09/05/2023 at 1356 hours, brain MR 07/31/2023 at 1435 hours, MRA [place] and neck vessels examination 07/31/2023.                                                                  IMPRESSION:      HEAD CTA:   1.  Peripheral branch arborization pattern of the RADHA, MCA and PCA vascular territory is satisfactory. No aneurysm or large vessel occlusion. Diminished enhancement and caliber left V3 and V4 segment is stable in appearance from MRA 07/31/2023. Evolving   infarction left cerebellar hemisphere.     NECK CTA:  1.  Stable appearance from MRA evaluation 07/31/2023. There is absent intravascular enhancement within the left V1, V2 and proximal V3 segment. Enhancement is present within reduced caliber distal left V3 and in the V4 segment present. This suggests left   vertebral artery arterial occlusion or proximal left vertebral arterial level dissection as before. No additional hemodynamic stenosis. The right vertebral artery supplies the basilar artery. Evolving infarction within the left cerebellar hemisphere   redemonstrated with no evidence for hemorrhagic transformation on today's study.    EXAM: CT HEAD W/O CONTRAST  LOCATION: [facility]  DATE: 9/5/2023     INDICATION: L cerebellar stroke, L vert  dissection.  COMPARISON: CT brain 08/02/2023. MRI brain 07/31/2023.                                                                     IMPRESSION:  1.  Left cerebellar infarct as seen on prior imaging. No finding for new infarct, hemorrhage, or mass.          EXAM: MR BRAIN W/O & W CONTRAST  7/31/2023 3:44 PM                                                                     IMPRESSION:     1.  Large acute versus early subacute infarct involving the left PICA territory as described above. Moderate edema-like signal is seen associated with the infarcted tissue resulting in slight mass effect  on the fourth ventricle. No evidence of hydrocephalus at this time.  2.  No evidence of hemorrhagic conversion.  3.  Small remote lacunar infarct versus dilated perivascular space in the right basal ganglia.  4.  Head and neck MRA reported separately.     [Critical Result: Large acute versus early subacute left PICA  territory infarct.]     Finding was identified on 7/31/2023 3:25 PM.      Dr. Artis was contacted by Dr. Bishop on 7/31/2023 at 3:30 PM  and verbalized understanding of the critical result.      PAIGE BISHOP MD     EXAM: MRA NECK (CAROTIDS) W/O & W CONTRAST  7/31/2023 3:45 PM      HISTORY: Headache, posterior neck pain, vertigo                                                                           IMPRESSION:      1.  Constellation of findings suspicious for left vertebral artery dissection as described above. Catheter directed angiography could  further evaluate if clinically indicated.  2.  Unremarkable MRA appearance of the right vertebral artery and carotid arteries.     [Critical Result: Suspicion for left vertebral artery dissection with  recent left PICA infarct.]     Finding was identified on 7/31/2023 3:25 PM.      Dr. Artis was contacted by Dr. Bishop on 7/31/2023 at 3:30 PM and verbalized understanding of the critical result.      PAIGE BISHOP MD        Again, thank you for  allowing me to participate in the care of your patient.      Sincerely,    Kalpana Gleason MD

## 2023-09-19 NOTE — PROGRESS NOTES
Lev Olvera MRN# [id number]   Age: 53 year old YOB: 1970     Requesting physician: No ref. provider found  Deejay De Los Santos            Assessment and Plan:     (I77.74) Vertebral artery dissection (H)  (primary encounter diagnosis)  Comment: Recovering well from the dissection. I reviewed the images at the time of admission and the more recent images of CT head and CTA head and neck with the patient. His dissection is healing the left vertebral artery is still significantly affected with minimal blood flow.     We reviewed the pathophysiology and mechanism of injury. Advised that when tired or run down he may experience more symptoms. He was counseled to avoid activities such chiropractic neck adjustments, roller coasters etc. He has some balance deficits so he was also counseled to avoid ladders. I think he can return to work with no restrictions but reviewed sensible lifting practices.     He knows to return to the ED if he notes new neurological symptoms. He should contact me for new headaches or worsening numbness in the feet.     He notes some decreased libido. If it continues use of Viagra could be tried after 6 months post dissection.    Stroke risk factor modification reviewed. He understand he will work with Dr. De Los Santos moving forward to preventive further strokes. This includes:  ASA 81 mg daily lifelong  LDL goal 40-70 currently on atorvastatin.  SBP goal < 140, DBP < 90, on lisinopril and amlodipine  Monitoring for diabetes in future per PCP recs.   Avoidance of tobacco products  Healthy diet and exercise  Moderate alcohol    Follow up with me as needed.     Kalpana Gleason MD           History of Present Illness:     chief complaint: Hospital follow up     Lev Olvera is a 53 year old patient presenting for follow up after recent hospitalization for a vertebral artery dissection ad posterior circulation stroke. The patient was admitted on 7/31/23 after experiencing several  days of dizziness and incoordination. Imaging revealed a left vertebral artery dissection with no flow in the distal left vertebral artery and a left PICA territory stroke.     Symptoms started as a knot in the back of the neck/head. The next day he didn't feel great and the following day when he woke up he felt badly. When he tried to get up he felt weak on his feet. The following day felt better went on a boat ride and he felt exhausted. The following day within two hours he came home from work feeling sick.     Since the hospital stay he feels he is doing better. No dizziness. He still has some balance issues he estimates he is 98% recovered.  In the middle of his leg he feels tingling up the leg when he touches the right foot.     Neck pain is better, no headaches.     He is taking Atorvastatin. He notes some EGD after taking it. Tums helps.   Using two antihypertensives. Amlodipine and lisinopril  ASA 81 mg a day. Tokk Plavix for 16 days after hospital discharge    He is going in for work. 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          Physical Exam:     /77   Pulse 66   Resp 16   SpO2 99%   Gen: Awake, alert, no acute distress  Neuro:  No aphasia or dysarthria. CN 2-12 intact  Normal muscle bulk, tone and strength x 4   Finger to nose and heel knee shin  Finger taps normal  Normal walking, tandem slight hesitation but stable. Rigo to walk on toes and heels.   Sensation to vibration normal in lower ext.   Temperature reduced in the medial right leg into the foot.            Pertinent history/data     Past Medical History:  Hyperlipidemia      Component      Latest Ref Rng 7/31/2023  12:39 PM 7/31/2023  8:35 PM   Cholesterol      <200 mg/dL 287 (H)     Triglycerides      <150 mg/dL 128     HDL Cholesterol      >=40 mg/dL 60     LDL Cholesterol Calculated       <=100 mg/dL 201 (H)     Non HDL Cholesterol      <130 mg/dL 227 (H)     Hemoglobin A1C      <5.7 % 5.5     Troponin T, High Sensitivity      <=22 ng/L  7       Legend:  (H) High    EXAM: CTA HEAD NECK W CONTRAST  LOCATION: [facility]  DATE: 9/5/2023     INDICATION: Left cerebellar stroke, left vert dissection.  COMPARISON: Head CT 09/05/2023 at 1356 hours, brain MR 07/31/2023 at 1435 hours, MRA [place] and neck vessels examination 07/31/2023.                                                                  IMPRESSION:      HEAD CTA:   1.  Peripheral branch arborization pattern of the RADHA, MCA and PCA vascular territory is satisfactory. No aneurysm or large vessel occlusion. Diminished enhancement and caliber left V3 and V4 segment is stable in appearance from MRA 07/31/2023. Evolving   infarction left cerebellar hemisphere.     NECK CTA:  1.  Stable appearance from MRA evaluation 07/31/2023. There is absent intravascular enhancement within the left V1, V2 and proximal V3 segment. Enhancement is present within reduced caliber distal left V3 and in the V4 segment present. This suggests left   vertebral artery arterial occlusion or proximal left vertebral arterial level dissection as before. No additional hemodynamic stenosis. The right vertebral artery supplies the basilar artery. Evolving infarction within the left cerebellar hemisphere   redemonstrated with no evidence for hemorrhagic transformation on today's study.    EXAM: CT HEAD W/O CONTRAST  LOCATION: [facility]  DATE: 9/5/2023     INDICATION: L cerebellar stroke, L vert dissection.  COMPARISON: CT brain 08/02/2023. MRI brain 07/31/2023.                                                                     IMPRESSION:  1.  Left cerebellar infarct as seen on prior imaging. No finding for new infarct, hemorrhage, or mass.          EXAM: MR BRAIN W/O & W CONTRAST  7/31/2023 3:44 PM                                                                      IMPRESSION:     1.  Large acute versus early subacute infarct involving the left PICA territory as described above. Moderate edema-like signal is seen associated with the infarcted tissue resulting in slight mass effect  on the fourth ventricle. No evidence of hydrocephalus at this time.  2.  No evidence of hemorrhagic conversion.  3.  Small remote lacunar infarct versus dilated perivascular space in the right basal ganglia.  4.  Head and neck MRA reported separately.     [Critical Result: Large acute versus early subacute left PICA  territory infarct.]     Finding was identified on 7/31/2023 3:25 PM.      Dr. Artis was contacted by Dr. Bishop on 7/31/2023 at 3:30 PM  and verbalized understanding of the critical result.      PAIGE BISHOP MD     EXAM: MRA NECK (CAROTIDS) W/O & W CONTRAST  7/31/2023 3:45 PM      HISTORY: Headache, posterior neck pain, vertigo                                                                           IMPRESSION:      1.  Constellation of findings suspicious for left vertebral artery dissection as described above. Catheter directed angiography could  further evaluate if clinically indicated.  2.  Unremarkable MRA appearance of the right vertebral artery and carotid arteries.     [Critical Result: Suspicion for left vertebral artery dissection with  recent left PICA infarct.]     Finding was identified on 7/31/2023 3:25 PM.      Dr. Artis was contacted by Dr. Bishop on 7/31/2023 at 3:30 PM and verbalized understanding of the critical result.      PAIGE BISHOP MD

## 2023-09-19 NOTE — Clinical Note
9/19/2023       RE: Lev Olvera  [address]  [facility] [address]     Dear Colleague,    Thank you for referring your patient, Lev Olvera, to the [facility] [place] [facility]. Please see a copy of my visit note below.            Lev Olvera MRN# [id number]   Age: 53 year old YOB: 1970     Requesting physician: No ref. provider found  Deejay De Los Santos            Assessment and Plan:     (I77.74) Vertebral artery dissection (H)  (primary encounter diagnosis)  Comment: Recovering well from the dissection. I reviewed the images at the time of admission and the more recent images of CT head and CTA head and neck with the patient. His dissection is healing the left vertebral artery is still significantly affected with minimal blood flow.     We reviewed the pathophysiology and mechanism of injury. Advised that when tired or run down he may experience more symptoms. He was counseled to avoid activities such chiropractic neck adjustments, roller coasters etc. He has some balance deficits so he was also counseled to avoid ladders. I think he can return to work with no restrictions but reviewed sensible lifting practices.     He knows to return to the ED if he notes new neurological symptoms. He should contact me for new headaches or worsening numbness in the feet.     He notes some decreased libido. If it continues use of Viagra could be tried after 6 months post dissection.    Stroke risk factor modification reviewed. He understand he will work with Dr. De Los Santos moving forward to preventive further strokes. This includes:  ASA 81 mg daily lifelong  LDL goal 40-70 currently on atorvastatin.  SBP goal < 140, DBP < 90, on lisinopril and amlodipine  Monitoring for diabetes in future per PCP recs.   Avoidance of tobacco products  Healthy diet and exercise  Moderate alcohol    Follow up with me as needed.      Kalpana Gleason MD           History of Present Illness:     chief complaint: Hospital follow up     Lev Olvera is a 53 year old patient presenting for follow up after recent hospitalization for a vertebral artery dissection ad posterior circulation stroke. The patient was admitted on 7/31/23 after experiencing several days of dizziness and incoordination. Imaging revealed a left vertebral artery dissection with no flow in the distal left vertebral artery and a left PICA territory stroke.     Symptoms started as a knot in the back of the neck/head. The next day he didn't feel great and the following day when he woke up he felt badly. When he tried to get up he felt weak on his feet. The following day felt better went on a boat ride and he felt exhausted. The following day within two hours he came home from work feeling sick.     Since the hospital stay he feels he is doing better. No dizziness. He still has some balance issues he estimates he is 98% recovered.  In the middle of his leg he feels tingling up the leg when he touches the right foot.     Neck pain is better, no headaches.     He is taking Atorvastatin. He notes some EGD after taking it. Tums helps.   Using two antihypertensives. Amlodipine and lisinopril  ASA 81 mg a day. Tokk Plavix for 16 days after hospital discharge    He is going in for work. 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          Physical Exam:     /77   Pulse 66   Resp 16   SpO2 99%   Gen: Awake, alert, no acute distress  Neuro:  No aphasia or dysarthria. CN 2-12 intact  Normal muscle bulk, tone and strength x 4   Finger to nose and heel knee shin  Finger taps normal  Normal walking, tandem slight hesitation but stable. Rigo to walk on toes and heels.   Sensation to vibration normal in lower ext.   Temperature reduced in the  medial right leg into the foot.            Pertinent history/data     Past Medical History:  Hyperlipidemia      Component      Latest Ref Rng 7/31/2023  12:39 PM 7/31/2023  8:35 PM   Cholesterol      <200 mg/dL 287 (H)     Triglycerides      <150 mg/dL 128     HDL Cholesterol      >=40 mg/dL 60     LDL Cholesterol Calculated      <=100 mg/dL 201 (H)     Non HDL Cholesterol      <130 mg/dL 227 (H)     Hemoglobin A1C      <5.7 % 5.5     Troponin T, High Sensitivity      <=22 ng/L  7       Legend:  (H) High    EXAM: CTA HEAD NECK W CONTRAST  LOCATION: [facility]  DATE: 9/5/2023     INDICATION: Left cerebellar stroke, left vert dissection.  COMPARISON: Head CT 09/05/2023 at 1356 hours, brain MR 07/31/2023 at 1435 hours, MRA [place] and neck vessels examination 07/31/2023.                                                                  IMPRESSION:      HEAD CTA:   1.  Peripheral branch arborization pattern of the RADHA, MCA and PCA vascular territory is satisfactory. No aneurysm or large vessel occlusion. Diminished enhancement and caliber left V3 and V4 segment is stable in appearance from MRA 07/31/2023. Evolving   infarction left cerebellar hemisphere.     NECK CTA:  1.  Stable appearance from MRA evaluation 07/31/2023. There is absent intravascular enhancement within the left V1, V2 and proximal V3 segment. Enhancement is present within reduced caliber distal left V3 and in the V4 segment present. This suggests left   vertebral artery arterial occlusion or proximal left vertebral arterial level dissection as before. No additional hemodynamic stenosis. The right vertebral artery supplies the basilar artery. Evolving infarction within the left cerebellar hemisphere   redemonstrated with no evidence for hemorrhagic transformation on today's study.    EXAM: CT HEAD W/O CONTRAST  LOCATION: [facility]  DATE: 9/5/2023     INDICATION: L cerebellar stroke, L vert  dissection.  COMPARISON: CT brain 08/02/2023. MRI brain 07/31/2023.                                                                     IMPRESSION:  1.  Left cerebellar infarct as seen on prior imaging. No finding for new infarct, hemorrhage, or mass.          EXAM: MR BRAIN W/O & W CONTRAST  7/31/2023 3:44 PM                                                                     IMPRESSION:     1.  Large acute versus early subacute infarct involving the left PICA territory as described above. Moderate edema-like signal is seen associated with the infarcted tissue resulting in slight mass effect  on the fourth ventricle. No evidence of hydrocephalus at this time.  2.  No evidence of hemorrhagic conversion.  3.  Small remote lacunar infarct versus dilated perivascular space in the right basal ganglia.  4.  Head and neck MRA reported separately.     [Critical Result: Large acute versus early subacute left PICA  territory infarct.]     Finding was identified on 7/31/2023 3:25 PM.      Dr. Artis was contacted by Dr. Bishop on 7/31/2023 at 3:30 PM  and verbalized understanding of the critical result.      PAIGE BISHOP MD     EXAM: MRA NECK (CAROTIDS) W/O & W CONTRAST  7/31/2023 3:45 PM      HISTORY: Headache, posterior neck pain, vertigo                                                                           IMPRESSION:      1.  Constellation of findings suspicious for left vertebral artery dissection as described above. Catheter directed angiography could  further evaluate if clinically indicated.  2.  Unremarkable MRA appearance of the right vertebral artery and carotid arteries.     [Critical Result: Suspicion for left vertebral artery dissection with  recent left PICA infarct.]     Finding was identified on 7/31/2023 3:25 PM.      Dr. Artis was contacted by Dr. Bishop on 7/31/2023 at 3:30 PM and verbalized understanding of the critical result.      PAIGE BISHOP MD          Again, thank you for  allowing me to participate in the care of your patient.      Sincerely,    Kalpana Gleason MD

## 2023-09-21 DIAGNOSIS — I10 BENIGN ESSENTIAL HYPERTENSION: ICD-10-CM

## 2023-09-21 RX ORDER — AMLODIPINE BESYLATE 5 MG/1
TABLET ORAL
Qty: 90 TABLET | Refills: 0 | Status: SHIPPED | OUTPATIENT
Start: 2023-09-21 | End: 2023-12-26

## 2023-09-22 ENCOUNTER — TELEPHONE (OUTPATIENT)
Dept: INTERNAL MEDICINE | Facility: CLINIC | Age: 53
End: 2023-09-22
Payer: COMMERCIAL

## 2023-09-22 ENCOUNTER — TELEPHONE (OUTPATIENT)
Dept: GASTROENTEROLOGY | Facility: CLINIC | Age: 53
End: 2023-09-22
Payer: COMMERCIAL

## 2023-09-22 NOTE — TELEPHONE ENCOUNTER
Found form and called wife Cesia who wanted form emailed to [email or web address].

## 2023-09-22 NOTE — TELEPHONE ENCOUNTER
Pt's spouse on C2C called    Has been on 6 weeks of weight restrictions following a stroke- pt saw Neurologist who took pt off restrictions - and cleared him to go back to normal work     However, pt's work needs the disability paperwork that Dr De Los Santos previously filled out     Do not see signed form scanned into pt's chart - routing to [facility]. Are you able to locate copy of paperwork? She wants a call when it is ready to  at the      Elsa Mahmood RN  [facility]

## 2023-09-22 NOTE — TELEPHONE ENCOUNTER
Pre Assessment RN Review    Focused Assessments    Stroke/TIA Review    Patient has hx of TIA/Stroke in the last 6 months. Contacted PCP / ordering provider to discuss appropriateness of procedure and recommended delay of procedure for at least 6 months. Appropriateness of procedure will be reevaluated according to provider recommendation.      Scheduling Status & Recommendations    Delay scheduling, awaiting clinical input. Message sent to Ordering provider who is patient's PCP, Dr. De Los Santos.     Gloria Nunez RN

## 2023-09-22 NOTE — TELEPHONE ENCOUNTER
"Cesia completed screening. Advised may need to wait 6 months due to stroke on 8/1. RN sent message to physician to review and will call her back once response received [phone]     Endoscopy Scheduling Screen    Have you had a positive Covid test in the last 14 days?  No    Are you active on MyChart?   Yes    What insurance is in the chart?  Other:  [employer]    Ordering/Referring Provider:   CONNIE MOTT        (If ordering provider performs procedure, schedule with ordering provider unless otherwise instructed. )    BMI: Estimated body mass index is 28.62 kg/m  as calculated from the following:    Height as of 5/11/23: 1.918 m (6' 3.5\").    Weight as of 8/7/23: 105.2 kg (232 lb).     Sedation Ordered  moderate sedation.   If patient BMI > 50 do not schedule in ASC.    If patient BMI > 45 do not schedule at [facility].    Are you taking methadone or Suboxone?  No    Are you taking any prescription medications for pain 3 or more times per week?   No    Do you have a history of malignant hyperthermia or adverse reaction to anesthesia?  No    (Females) Are you currently pregnant?   No     Have you been diagnosed or told you have pulmonary hypertension?   No    Do you have an LVAD?  No    Have you been told you have moderate to severe sleep apnea?  Yes (RN Review required for scheduling unless scheduling in Hospital.)    Have you been told you have COPD, asthma, or any other lung disease?  No    Do you have any heart conditions?  No     Have you ever had an organ transplant?   No    Have you ever had or are you awaiting a heart or lung transplant?   No    Have you had a stroke or transient ischemic attack (TIA aka \"mini stroke\" in the last 6 months?   Yes (RN Review required for scheduling.)8/1/23    Have you been diagnosed with or been told you have cirrhosis of the liver?   No    Are you currently on dialysis?   No    Do you need assistance transferring?   No    BMI: Estimated body mass index is 28.62 kg/m  as " "calculated from the following:    Height as of 5/11/23: 1.918 m (6' 3.5\").    Weight as of 8/7/23: 105.2 kg (232 lb).     Is patients BMI > 40 and scheduling location [facility]?  No    Do you take an injectable medication for weight loss or diabetes (excluding insulin)?  No    Do you take the medication Naltrexone?  No    Do you take blood thinners?  No       Prep   Are you currently on dialysis or do you have chronic kidney disease?  No    Do you have a diagnosis of diabetes?  No    Do you have a diagnosis of cystic fibrosis (CF)?  No    On a regular basis do you go 3 -5 days between bowel movements?  No    BMI > 40?  No    Preferred Pharmacy:        [facility]/pharmacy #[id number] [facility] [address] [address]  Phone: [phone] Fax: [phone]          Final Scheduling Details   Colonoscopy prep sent?  Standard MiraLAX    Procedure scheduled  Colonoscopy    Surgeon:  VERO     Date of procedure:  TBD     Pre-OP / PAC:   No - Not required for this site.    Location  [facility] - Patient preference.    Sedation   Moderate Sedation - Per order.      Patient Reminders:   You will receive a call from a Nurse to review instructions and health history.  This assessment must be completed prior to your procedure.  Failure to complete the Nurse assessment may result in the procedure being cancelled.      On the day of your procedure, please designate an adult(s) who can drive you home stay with you for the next 24 hours. The medicines used in the exam will make you sleepy. You will not be able to drive.      You cannot take public transportation, ride share services, or non-medical taxi service without a responsible caregiver.  Medical transport services are allowed with the requirement that a responsible caregiver will receive you at your destination.  We require that drivers and caregivers are confirmed prior to your procedure.  "

## 2023-09-29 NOTE — TELEPHONE ENCOUNTER
Response received from referring provider:    His colonoscopy may be delayed as recommended.  Sorry for delay in responding -     Dr. De Los Santos     Delay procedure 6 months from 7/31/23.  Hospital only d/t severe KATHERINE.  Forwarded to scheduling.      Azeb Childers RN  Endoscopy Procedure Pre-Assessment RN

## 2023-12-19 ENCOUNTER — MYC MEDICAL ADVICE (OUTPATIENT)
Dept: INTERNAL MEDICINE | Facility: CLINIC | Age: 53
End: 2023-12-19
Payer: COMMERCIAL

## 2023-12-19 DIAGNOSIS — I63.50 POSTERIOR CIRCULATION STROKE (H): ICD-10-CM

## 2023-12-19 RX ORDER — ATORVASTATIN CALCIUM 80 MG/1
80 TABLET, FILM COATED ORAL EVERY EVENING
Qty: 90 TABLET | Refills: 3 | Status: SHIPPED | OUTPATIENT
Start: 2023-12-19

## 2023-12-22 ENCOUNTER — LAB (OUTPATIENT)
Dept: LAB | Facility: CLINIC | Age: 53
End: 2023-12-22
Attending: INTERNAL MEDICINE
Payer: COMMERCIAL

## 2023-12-22 DIAGNOSIS — E78.5 HYPERLIPIDEMIA LDL GOAL <70: ICD-10-CM

## 2023-12-22 LAB
ALBUMIN SERPL BCG-MCNC: 4.6 G/DL (ref 3.5–5.2)
ALP SERPL-CCNC: 119 U/L (ref 40–150)
ALT SERPL W P-5'-P-CCNC: 52 U/L (ref 0–70)
ANION GAP SERPL CALCULATED.3IONS-SCNC: 10 MMOL/L (ref 7–15)
AST SERPL W P-5'-P-CCNC: 39 U/L (ref 0–45)
BILIRUB SERPL-MCNC: 0.7 MG/DL
BUN SERPL-MCNC: 18 MG/DL (ref 6–20)
CALCIUM SERPL-MCNC: 9.3 MG/DL (ref 8.6–10)
CHLORIDE SERPL-SCNC: 102 MMOL/L (ref 98–107)
CHOLEST SERPL-MCNC: 163 MG/DL
CREAT SERPL-MCNC: 0.95 MG/DL (ref 0.67–1.17)
DEPRECATED HCO3 PLAS-SCNC: 26 MMOL/L (ref 22–29)
EGFRCR SERPLBLD CKD-EPI 2021: >90 ML/MIN/1.73M2
FASTING STATUS PATIENT QL REPORTED: NORMAL
GLUCOSE SERPL-MCNC: 103 MG/DL (ref 70–99)
HDLC SERPL-MCNC: 55 MG/DL
LDLC SERPL CALC-MCNC: 87 MG/DL
NONHDLC SERPL-MCNC: 108 MG/DL
POTASSIUM SERPL-SCNC: 4.2 MMOL/L (ref 3.4–5.3)
PROT SERPL-MCNC: 7.6 G/DL (ref 6.4–8.3)
SODIUM SERPL-SCNC: 138 MMOL/L (ref 135–145)
TRIGL SERPL-MCNC: 105 MG/DL

## 2023-12-22 PROCEDURE — 80053 COMPREHEN METABOLIC PANEL: CPT

## 2023-12-22 PROCEDURE — 36415 COLL VENOUS BLD VENIPUNCTURE: CPT

## 2023-12-22 PROCEDURE — 80061 LIPID PANEL: CPT

## 2023-12-24 DIAGNOSIS — I10 BENIGN ESSENTIAL HYPERTENSION: ICD-10-CM

## 2023-12-26 RX ORDER — AMLODIPINE BESYLATE 5 MG/1
TABLET ORAL
Qty: 90 TABLET | Refills: 1 | Status: SHIPPED | OUTPATIENT
Start: 2023-12-26 | End: 2024-06-26

## 2023-12-28 NOTE — TELEPHONE ENCOUNTER
KWAMEM and sent message to patient to get colonoscopy scheduled after 2/1/24 (stroke on 7/31/23). Needs to be at hospital due to severe KATHERINE.

## 2024-06-01 ENCOUNTER — HEALTH MAINTENANCE LETTER (OUTPATIENT)
Age: 54
End: 2024-06-01

## 2024-06-26 DIAGNOSIS — I10 BENIGN ESSENTIAL HYPERTENSION: ICD-10-CM

## 2024-06-26 RX ORDER — AMLODIPINE BESYLATE 5 MG/1
TABLET ORAL
Qty: 90 TABLET | Refills: 1 | Status: SHIPPED | OUTPATIENT
Start: 2024-06-26

## 2024-08-04 DIAGNOSIS — I10 BENIGN ESSENTIAL HYPERTENSION: ICD-10-CM

## 2024-08-05 RX ORDER — LISINOPRIL 20 MG/1
TABLET ORAL
Qty: 90 TABLET | Refills: 0 | Status: SHIPPED | OUTPATIENT
Start: 2024-08-05

## 2024-10-04 ENCOUNTER — MYC MEDICAL ADVICE (OUTPATIENT)
Dept: INTERNAL MEDICINE | Facility: CLINIC | Age: 54
End: 2024-10-04
Payer: COMMERCIAL

## 2024-10-04 DIAGNOSIS — Z12.11 SCREENING FOR COLON CANCER: Primary | ICD-10-CM

## 2024-11-02 DIAGNOSIS — I10 BENIGN ESSENTIAL HYPERTENSION: ICD-10-CM

## 2024-11-04 RX ORDER — LISINOPRIL 20 MG/1
TABLET ORAL
Qty: 90 TABLET | Refills: 0 | Status: SHIPPED | OUTPATIENT
Start: 2024-11-04

## 2024-11-11 ENCOUNTER — TELEPHONE (OUTPATIENT)
Dept: GASTROENTEROLOGY | Facility: CLINIC | Age: 54
End: 2024-11-11
Payer: COMMERCIAL

## 2024-11-11 NOTE — TELEPHONE ENCOUNTER
"Endoscopy Scheduling Screen    Have you had any respiratory illness or flu-like symptoms in the last 10 days?  No    What is your communication preference for Instructions and/or Bowel Prep?   MyChart    What insurance is in the chart?  Other:  [employer]    Ordering/Referring Provider:   CONNIE MOTT        (If ordering provider performs procedure, schedule with ordering provider unless otherwise instructed. )    BMI: Estimated body mass index is 28.62 kg/m  as calculated from the following:    Height as of 5/11/23: 1.918 m (6' 3.5\").    Weight as of 8/7/23: 105.2 kg (232 lb).     Sedation Ordered  moderate sedation.   If patient BMI > 50 do not schedule in ASC.    If patient BMI > 45 do not schedule at [facility].    Are you taking methadone or Suboxone?  NO, No RN review required.    Have you been diagnosed and are being treated for severe PTSD or severe anxiety?  NO, No RN review required.    Are you taking any prescription medications for pain 3 or more times per week?   NO, No RN review required.    Do you have a history of malignant hyperthermia?  No    (Females) Are you currently pregnant?   No     Have you been diagnosed or told you have pulmonary hypertension?   No    Do you have an LVAD?  No    Have you been told you have moderate to severe sleep apnea?  Yes. Do you use a CPAP? Yes. (RN Review required for scheduling unless scheduling in Hospital.)     Have you been told you have COPD, asthma, or any other lung disease?  No    Do you have any heart conditions?  No     Have you ever had or are you waiting for an organ transplant?  No. Continue scheduling, no site restrictions.    Have you had a stroke or transient ischemic attack (TIA aka \"mini stroke\" in the last 6 months?   No    Have you been diagnosed with or been told you have cirrhosis of the liver?   No.    Are you currently on dialysis?   No    Do you need assistance transferring?   No    BMI: Estimated body mass index is 28.62 kg/m  as calculated " "from the following:    Height as of 5/11/23: 1.918 m (6' 3.5\").    Weight as of 8/7/23: 105.2 kg (232 lb).     Is patients BMI > 40 and scheduling location [facility]?  No    Do you take an injectable or oral medication for weight loss or diabetes (excluding insulin)?  No    Do you take the medication Naltrexone?  No    Do you take blood thinners?  No       Prep   Are you currently on dialysis or do you have chronic kidney disease?  No    Do you have a diagnosis of diabetes?  No    Do you have a diagnosis of cystic fibrosis (CF)?  No    On a regular basis do you go 3 -5 days between bowel movements?  No    BMI > 40?  No    Preferred Pharmacy:    [facility]/pharmacy #[id number] [place], [place] - [address] [address]  Phone: [phone] Fax: [phone]    Final Scheduling Details     Procedure scheduled  Colonoscopy    Surgeon:  JUDY     Date of procedure:  01/07/2025    Pre-OP / PAC:   No - Not required for this site.    Location  [facility] - Per order.    Sedation   Moderate Sedation - Per order.      Patient Reminders:   You will receive a call from a Nurse to review instructions and health history.  This assessment must be completed prior to your procedure.  Failure to complete the Nurse assessment may result in the procedure being cancelled.      On the day of your procedure, please designate an adult(s) who can drive you home stay with you for the next 24 hours. The medicines used in the exam will make you sleepy. You will not be able to drive.      You cannot take public transportation, ride share services, or non-medical taxi service without a responsible caregiver.  Medical transport services are allowed with the requirement that a responsible caregiver will receive you at your destination.  We require that drivers and caregivers are confirmed prior to your procedure.  "

## 2024-12-25 DIAGNOSIS — I63.50 POSTERIOR CIRCULATION STROKE (H): ICD-10-CM

## 2024-12-26 DIAGNOSIS — I10 BENIGN ESSENTIAL HYPERTENSION: ICD-10-CM

## 2024-12-26 RX ORDER — ATORVASTATIN CALCIUM 80 MG/1
80 TABLET, FILM COATED ORAL EVERY EVENING
Qty: 90 TABLET | Refills: 3 | Status: SHIPPED | OUTPATIENT
Start: 2024-12-26

## 2024-12-26 RX ORDER — AMLODIPINE BESYLATE 5 MG/1
TABLET ORAL
Qty: 90 TABLET | Refills: 1 | Status: SHIPPED | OUTPATIENT
Start: 2024-12-26

## 2025-01-07 ENCOUNTER — HOSPITAL ENCOUNTER (OUTPATIENT)
Facility: CLINIC | Age: 55
Discharge: HOME | End: 2025-01-07
Attending: COLON & RECTAL SURGERY | Admitting: COLON & RECTAL SURGERY
Payer: COMMERCIAL

## 2025-01-07 VITALS
HEIGHT: 75 IN | SYSTOLIC BLOOD PRESSURE: 116 MMHG | WEIGHT: 232 LBS | HEART RATE: 62 BPM | RESPIRATION RATE: 10 BRPM | OXYGEN SATURATION: 96 % | BODY MASS INDEX: 28.85 KG/M2 | DIASTOLIC BLOOD PRESSURE: 83 MMHG

## 2025-01-07 LAB — COLONOSCOPY: NORMAL

## 2025-01-07 PROCEDURE — 45385 COLONOSCOPY W/LESION REMOVAL: CPT | Performed by: COLON & RECTAL SURGERY

## 2025-01-07 PROCEDURE — G0500 MOD SEDAT ENDO SERVICE >5YRS: HCPCS | Performed by: COLON & RECTAL SURGERY

## 2025-01-07 PROCEDURE — 88305 TISSUE EXAM BY PATHOLOGIST: CPT | Mod: TC | Performed by: COLON & RECTAL SURGERY

## 2025-01-07 PROCEDURE — 250N000011 HC RX IP 250 OP 636: Performed by: COLON & RECTAL SURGERY

## 2025-01-07 PROCEDURE — 88305 TISSUE EXAM BY PATHOLOGIST: CPT | Mod: 26 | Performed by: PATHOLOGY

## 2025-01-07 RX ORDER — NALOXONE HYDROCHLORIDE 0.4 MG/ML
0.4 INJECTION, SOLUTION INTRAMUSCULAR; INTRAVENOUS; SUBCUTANEOUS
Status: CANCELLED | OUTPATIENT
Start: 2025-01-07

## 2025-01-07 RX ORDER — NALOXONE HYDROCHLORIDE 0.4 MG/ML
0.2 INJECTION, SOLUTION INTRAMUSCULAR; INTRAVENOUS; SUBCUTANEOUS
Status: CANCELLED | OUTPATIENT
Start: 2025-01-07

## 2025-01-07 RX ORDER — LIDOCAINE 40 MG/G
CREAM TOPICAL
Status: DISCONTINUED | OUTPATIENT
Start: 2025-01-07 | End: 2025-01-07 | Stop reason: HOSPADM

## 2025-01-07 RX ORDER — ONDANSETRON 2 MG/ML
4 INJECTION INTRAMUSCULAR; INTRAVENOUS
Status: DISCONTINUED | OUTPATIENT
Start: 2025-01-07 | End: 2025-01-07 | Stop reason: HOSPADM

## 2025-01-07 RX ORDER — FLUMAZENIL 0.1 MG/ML
0.2 INJECTION, SOLUTION INTRAVENOUS
Status: CANCELLED | OUTPATIENT
Start: 2025-01-07 | End: 2025-01-07

## 2025-01-07 RX ORDER — ONDANSETRON 2 MG/ML
4 INJECTION INTRAMUSCULAR; INTRAVENOUS EVERY 6 HOURS PRN
Status: CANCELLED | OUTPATIENT
Start: 2025-01-07

## 2025-01-07 RX ORDER — PROCHLORPERAZINE MALEATE 10 MG
10 TABLET ORAL EVERY 6 HOURS PRN
Status: CANCELLED | OUTPATIENT
Start: 2025-01-07

## 2025-01-07 RX ORDER — FENTANYL CITRATE 50 UG/ML
INJECTION, SOLUTION INTRAMUSCULAR; INTRAVENOUS PRN
Status: DISCONTINUED | OUTPATIENT
Start: 2025-01-07 | End: 2025-01-07 | Stop reason: HOSPADM

## 2025-01-07 RX ORDER — ONDANSETRON 4 MG/1
4 TABLET, ORALLY DISINTEGRATING ORAL EVERY 6 HOURS PRN
Status: CANCELLED | OUTPATIENT
Start: 2025-01-07

## 2025-01-07 ASSESSMENT — ACTIVITIES OF DAILY LIVING (ADL): ADLS_ACUITY_SCORE: 46

## 2025-01-08 LAB
PATH REPORT.COMMENTS IMP SPEC: NORMAL
PATH REPORT.COMMENTS IMP SPEC: NORMAL
PATH REPORT.FINAL DX SPEC: NORMAL
PATH REPORT.GROSS SPEC: NORMAL
PATH REPORT.MICROSCOPIC SPEC OTHER STN: NORMAL
PATH REPORT.RELEVANT HX SPEC: NORMAL
PHOTO IMAGE: NORMAL

## 2025-02-09 DIAGNOSIS — I10 BENIGN ESSENTIAL HYPERTENSION: ICD-10-CM

## 2025-02-10 RX ORDER — LISINOPRIL 20 MG/1
TABLET ORAL
Qty: 90 TABLET | Refills: 0 | Status: SHIPPED | OUTPATIENT
Start: 2025-02-10

## 2025-05-07 NOTE — PROGRESS NOTES
"Chief Complaint:   No chief complaint on file.     History Of Present Illness:    Ab Kendrick \"KENDY\" is a 82 y.o. male with a history of chronic systolic and diastolic heart failure, CAD (anterolateral STEMI 12/2/2010 with PCI to the LAD and staged PCI to the RCA), dyslipidemia, hypertension, ventricular tachycardia with [employer] ICD (MRI safe), chronic atrial fibrillation on long-term oral anticoagulation, aortic stenosis, and pulmonary hypertension here for routine follow-up.     Continues to do very well.  Denies any exertional chest pain or shortness of breath.  Denies any palpitations, lightheadedness or leg swelling.      Echocardiogram 5/30/2024: EF 40 to 45%.  Mild to moderate MR and TR.  Mild aortic stenosis.  RVSP 48 mmHg.     Echocardiogram 9/27/2021: EF 38% with moderately dilated LV. Mild left atrial enlargement. ICD/pacer leads noted in the RA and RV. Mild MR and moderate TR. Trace AI. RVSP 57 mmHg.     Catheterization 1/31/2017: Patent LAD and RCA stents. Moderate pulmonary hypertension with elevated LVEDP of 25 mmHg. EF 20%.     PCI to the LAD 12/2/2010 with HUGH. PCI to RCA 5/19/2011      Past Medical History:  He has a past medical history of CAD (coronary artery disease) (08/27/2023), Chronic combined systolic and diastolic heart failure, NYHA class 2 (08/27/2023), Chronic rhinitis, Diverticulosis of intestine, part unspecified, without perforation or abscess without bleeding, HTN (hypertension) (08/27/2023), Hyperlipidemia (08/27/2023), Myocardial infarction (Multi), and Persistent atrial fibrillation (Multi) (08/27/2023).    Past Surgical History:  He has a past surgical history that includes Other surgical history (10/19/2021); Other surgical history (10/19/2021); Other surgical history (10/19/2021); Other surgical history (10/19/2021); Other surgical history (10/19/2021); Coronary angioplasty with stent (12/02/2010); Coronary angioplasty with stent (05/19/2011); and Cardiac catheterization " [facility]    Medicine Progress Note - Hospitalist Service    Date of Admission:  7/31/2023    Assessment & Plan     This is a 53-year-old male with medical history which includes hypertension, obstructive sleep apnea who presented to the hospital with a chief complaint that he has been having dizziness, headache and unsteady gait about 3 days prior to presentation to the hospital.  Initially he thought it was due to his alcohol use and patient was evaluated in the ED and had CBC done which was unremarkable.  Basic metabolic panel was also normal.  He was found to have elevated total cholesterol of 287, HDL of 60, LDL of 201, non-HDL of 227 and triglycerides of 128.  And his troponin x2 were negative.  HbA1c was found to be 5.5.      Ultrasound of the lower extremities bilaterally did not show any evidence of DVT    MRI of the brain was done which showed large acute versus early subacute infarct involving the left PICA territory.  Moderate edema-like signal is seen associated with infarcted tissue resulting in slight mass effect of the fourth ventricle and no evidence of any hydrocephalus.  No evidence of any hemorrhagic conversion.  Small remote lacunar infarct versus dilated perivascular space in the right basal ganglia.    MRA of the neck was done which showed constellation of findings suspicious for left vertebral artery dissection.  Unremarkable MRA appearance of the right vertebral artery and carotid arteries.    MRA of the brain was done which showed absence of normal flow related signal within the left vertebral artery intradural segment likely related to dissection and unremarkable MRI of the anterior circulation    EKG was done which showed normal sinus rhythm      Posterior circulation stroke  Vertebral artery dissection  Hyperlipidemia  -On admission presented with vertigo, dizziness and unsteady gait and had work-up done as above including MRI of the brain which was concerning  "(01/31/2017).      Social History:  He reports that he quit smoking about 12 years ago. His smoking use included cigarettes. He has never used smokeless tobacco. He reports current alcohol use of about 8.0 standard drinks of alcohol per week. He reports that he does not use drugs.    Family History:  Family History   Problem Relation Name Age of Onset    Cancer Father      Prostate cancer Brother          Allergies:  Patient has no known allergies.    Outpatient Medications:  Current Outpatient Medications   Medication Instructions    bismuth subsalicylate (ANTI-DIARRHEAL ORAL) As needed    coenzyme Q-10 100 mg, Daily    Comirnaty 2023-24, 12y up,,PF, 30 mcg/0.3 mL suspension     dabigatran etexilate (PRADAXA) 150 mg, oral, 2 times daily    dapagliflozin propanediol (FARXIGA) 10 mg, oral, Daily    furosemide (LASIX) 20 mg, oral, Daily PRN    glucosamine sulfate 1,000 mg capsule 2 capsules, Daily    metoprolol succinate XL (TOPROL-XL) 25 mg, oral, Daily    multivitamin tablet 1 tablet, Daily    rosuvastatin (CRESTOR) 20 mg, oral, Daily    sacubitriL-valsartan (Entresto)  mg tablet 1 tablet, oral, 2 times daily    sildenafil (VIAGRA) 50 mg, oral, Daily PRN    spironolactone (ALDACTONE) 12.5 mg, oral, Daily    vitamin A 8,000 Units, Daily       Last Recorded Vitals:  Visit Vitals  /70 (BP Location: Right arm, Patient Position: Sitting)   Pulse 60   Ht 1.753 m (5' 9\")   Wt 69.9 kg (154 lb)   SpO2 97%   BMI 22.74 kg/m²   Smoking Status Former   BSA 1.84 m²      LASTWT(3):   Wt Readings from Last 3 Encounters:   05/08/25 69.9 kg (154 lb)   01/06/25 70.8 kg (156 lb)   10/24/24 70.9 kg (156 lb 4.8 oz)       Physical Exam:  HEENT: Carotid upstrokes normal with referred bilateral heart murmur. JVP is normal.  Pulmonary: Clear to auscultation bilaterally.  Cardiovascular: S1, S2, regular. II/VI early peaking crescendo decrescendo murmur at the right upper sternal border. No rubs or gallops.   Lower extremities: " for large acute versus subacute infarct involving the PICA territory on the left.  Moderate edema-like signal is associated with infarcted tissue resulting in slight mass effect with no evidence of hydrocephalus  -MRA of the neck as above was concerning for left vertebral artery dissection and right side was normal  -MRA of the brain showed absence of normal flow related signal within the left vertebral artery intradural segment likely related to dissection and unremarkable MRI of the anterior collation  -Stroke neurology was consulted   -Permissive hypertension with systolic goal less than 220  -Lipid panel was done which showed:total cholesterol of 287, HDL of 60, LDL of 201, non-HDL of 227 and triglycerides of 128.   -HbA1c is normal at 5.5  -Patient was started on 81 mg daily for secondary stroke prevention per stroke neurology, atorvastatin 40 mg  -Echo has been ordered and is pending  -Physical therapy, Occupational Therapy and speech has been consulted  -Continue to monitor patient on telemetry    Hypertension  -We will continue to hold his PTA home medications given recent stroke and permissive hypertension and restart his PTA meds when appropriate per stroke neurology team    Obstructive sleep apnea  -We will continue with CPAP             Diet: Regular Diet Adult    DVT Prophylaxis: Pneumatic Compression Devices  Ernandez Catheter: Not present  Lines: None     Cardiac Monitoring: ACTIVE order. Indication: Stroke, acute (48 hours)  Code Status: Full Code      Clinically Significant Risk Factors Present on Admission                  # Hypertension: Noted on problem list   # Non-Invasive mechanical ventilation: current O2 Device: BiPAP/CPAP  # Acute hypoxic respiratory failure: continue supplemental O2 as needed              Disposition Plan      Expected Discharge Date: 08/02/2023                  Bree Luque MD  Hospitalist Service  [facility]  Securely message with Vocera (more  "Warm. 2+ distal pulses. No edema.     Last Labs:  CBC -  Recent Labs     05/10/24  1142 01/03/24  0916 08/18/23  1219   WBC 4.0* 4.5 3.9*   HGB 13.7 14.4 13.3*   HCT 42.0 45.1 41.6    241 229   MCV 98 99 98       CMP -  Recent Labs     03/07/25  1200 10/18/24  1106 05/10/24  1142 07/16/21  1142 04/16/21  1133 03/18/21  0703 03/18/21  0647 12/31/20  0943   * 131* 136   < > 131*  --    < > 134*   K 5.1 5.3 5.0   < > 5.6*  --    < > 4.3   CL 98 94* 100   < > 95*  --    < > 98   CO2 25 27 28   < > 31  --    < > 30   ANIONGAP 11 15 13   < > 11  --    < > 10   BUN 15 18 19   < > 17  --    < > 21   CREATININE 0.87 1.11 1.10   < > 1.07  --    < > 1.10   EGFR 87 67 68   < >  --   --   --   --    MG  --   --   --   --  1.90 1.80  --  1.90    < > = values in this interval not displayed.     Recent Labs     03/07/25  1200 10/18/24  1106 05/10/24  1142   ALBUMIN 4.2 4.5 4.5   ALKPHOS 46 56 50   ALT 16 15 15   AST 27 21 22   BILITOT 0.4 0.5 0.5       LIPID PANEL -   Recent Labs     03/07/25  1200 10/18/24  1106 05/10/24  1142 08/18/23  1219 04/20/23  1031 07/01/22  1205   CHOL 128 176 172 163 177 173   LDLCALC 53 92 90  --   --   --    LDLF  --   --   --  83 94 87   HDL 60 68.4 67.5 63.3 69.4 73.0   TRIG 69 77 75 83 69 66       No results for input(s): \"BNP\", \"HGBA1C\" in the last 91649 hours.        Assessment/Plan   1) chronic combined systolic and diastolic heart failure: He is euvolemic.  Most recent echocardiogram 5/30/2024 with EF 40 to 45%.  Continues guideline directed medical therapy including Farxiga 10 mg daily, Toprol-XL 25 mg daily, spironolactone 12.5 mg daily and Entresto 97/103 mg twice daily.     2) CAD: PCI to the LAD 12/2/2010 with HUGH. PCI to RCA 5/19/2011. No signs or symptoms to suggest progression of underlying coronary disease. Based on the ISCHEMIA Trial there is no need for routine stress testing.  Last echocardiogram May 2024.  No need for repeat echocardiography.     3) dyslipidemia: " info)  Text page via [facility] Paging/Directory   ______________________________________________________________________    Interval History     Seen this morning and reviewed his images and the patient has discomfort at the back of his head and neck and ice packs have been given.  He has been tolerating regular diet with thin liquids.  I did discuss the results of MRI and duplex with the patient.  He denies any shortness of breath or chest discomfort.  He denies any vision changes.    Wife was present and said symptoms started Friday night and he thought it was due to alcohol and yesterday he came to er as he was not able to work well which is unusual for him    He  has been doing therapy     Wife and pt has questions which I answered and they will ask neuro stroke team     Physical Exam   Vital Signs: Temp: 98.2  F (36.8  C) Temp src: Oral BP: 117/84 Pulse: 71   Resp: 10 SpO2: 94 % O2 Device: BiPAP/CPAP    Weight: 220 lbs 7.36 oz        General: Patient appears comfortable and in no acute distress.  HEENT: Head is atraumatic, normocephalic.  Pupils are equal, round and reactive to light.  No scleral icterus. Oral mucosa is moist   Neck: Neck is supple a  Respiratory: Lungs are clear to auscultation bilaterally with no wheeze or crackles   Cardiovascular: Regular rate , S1 and S2 normal with no murmer or rubs or gallops  Abdomen:   soft , non tender , non distended and bowel sound present   Skin: No skin rashes or lesions to inspection or palpation.  Neurologic: Higher functions are within normal limits. No obvious defects in speech, language and memory. No facial droop  Musculoskeletal: Normal Range of motion over upper and lower extremities bilaterally   Psychiatric: cooperative      Medical Decision Making             Data      Continue with rosuvastatin 20 mg daily for now.  Most recent LDL in March 2025 was 53.     4) paroxysmal atrial fibrillation: Continue Pradaxa for long-term oral anticoagulation and metoprolol succinate for rate control.     5) erectile dysfunction: Prescription for Viagra given.     6) aortic stenosis.  Mild by most recent echocardiogram.  Would recommend continuing to observe.  If it sounds worsened by exam and more suggestive of moderate or severe aortic stenosis we will repeat an echocardiogram.    7) follow-up: 6 months or sooner if needed       Rodriguez Clements MD

## 2025-05-14 DIAGNOSIS — I10 BENIGN ESSENTIAL HYPERTENSION: ICD-10-CM

## 2025-05-14 RX ORDER — LISINOPRIL 20 MG/1
20 TABLET ORAL
Qty: 90 TABLET | Refills: 0 | Status: SHIPPED | OUTPATIENT
Start: 2025-05-14

## 2025-06-04 ENCOUNTER — OFFICE VISIT (OUTPATIENT)
Dept: INTERNAL MEDICINE | Facility: CLINIC | Age: 55
End: 2025-06-04
Payer: COMMERCIAL

## 2025-06-04 VITALS
HEART RATE: 81 BPM | RESPIRATION RATE: 12 BRPM | TEMPERATURE: 98.1 F | HEIGHT: 75 IN | DIASTOLIC BLOOD PRESSURE: 70 MMHG | WEIGHT: 255.3 LBS | OXYGEN SATURATION: 97 % | BODY MASS INDEX: 31.74 KG/M2 | SYSTOLIC BLOOD PRESSURE: 120 MMHG

## 2025-06-04 DIAGNOSIS — Z11.4 SCREENING FOR HIV (HUMAN IMMUNODEFICIENCY VIRUS): ICD-10-CM

## 2025-06-04 DIAGNOSIS — I10 BENIGN ESSENTIAL HYPERTENSION: ICD-10-CM

## 2025-06-04 DIAGNOSIS — Z11.59 NEED FOR HEPATITIS C SCREENING TEST: ICD-10-CM

## 2025-06-04 DIAGNOSIS — R45.86 MOOD CHANGES: ICD-10-CM

## 2025-06-04 DIAGNOSIS — R68.82 LOW LIBIDO: ICD-10-CM

## 2025-06-04 DIAGNOSIS — Z23 ENCOUNTER FOR IMMUNIZATION: ICD-10-CM

## 2025-06-04 DIAGNOSIS — Z12.5 SCREENING FOR PROSTATE CANCER: ICD-10-CM

## 2025-06-04 DIAGNOSIS — R13.19 ESOPHAGEAL DYSPHAGIA: ICD-10-CM

## 2025-06-04 DIAGNOSIS — Z00.00 ENCOUNTER FOR GENERAL ADULT MEDICAL EXAMINATION W/O ABNORMAL FINDINGS: Primary | ICD-10-CM

## 2025-06-04 PROCEDURE — 3078F DIAST BP <80 MM HG: CPT | Performed by: INTERNAL MEDICINE

## 2025-06-04 PROCEDURE — 99214 OFFICE O/P EST MOD 30 MIN: CPT | Mod: 25 | Performed by: INTERNAL MEDICINE

## 2025-06-04 PROCEDURE — 90471 IMMUNIZATION ADMIN: CPT | Performed by: INTERNAL MEDICINE

## 2025-06-04 PROCEDURE — 90750 HZV VACC RECOMBINANT IM: CPT | Performed by: INTERNAL MEDICINE

## 2025-06-04 PROCEDURE — 3074F SYST BP LT 130 MM HG: CPT | Performed by: INTERNAL MEDICINE

## 2025-06-04 PROCEDURE — 99396 PREV VISIT EST AGE 40-64: CPT | Mod: 25 | Performed by: INTERNAL MEDICINE

## 2025-06-04 SDOH — HEALTH STABILITY: PHYSICAL HEALTH: ON AVERAGE, HOW MANY DAYS PER WEEK DO YOU ENGAGE IN MODERATE TO STRENUOUS EXERCISE (LIKE A BRISK WALK)?: 5 DAYS

## 2025-06-04 ASSESSMENT — SOCIAL DETERMINANTS OF HEALTH (SDOH): HOW OFTEN DO YOU GET TOGETHER WITH FRIENDS OR RELATIVES?: MORE THAN THREE TIMES A WEEK

## 2025-06-04 NOTE — PROGRESS NOTES
Preventive Care Visit  [facility]  Deejay De Los Santos MD, Internal Medicine  Jun 4, 2025      Assessment & Plan     Encounter for general adult medical examination w/o abnormal findings  Discussed cardiac disease risk factor modification including screening for and treating HTN, lipids, DM, and smoking cessation.  Also discussed age appropriate cancer screening recommendations including testicular, prostate, colon and lung cancer as dictated by age group.  Recommended low fat, low salt diet and moderation in any alcohol intake.  Recommended always using seatbelts when in a car.  Recommended never driving after drinking or riding with someone who has been drinking as well.     Esophageal dysphagia  Start over-the-counter PPI daily, also recommended EGD given the frequent esophageal dysphagia, but patient wishes to decline at this time.    Benign essential hypertension  Reasonably well-controlled, recheck surveillance labs when able to return fasting  - Lipid panel reflex to direct LDL Fasting; Future  - Comprehensive metabolic panel; Future    Low libido  Patient not interested in medication therapy.  I do feel that regular exercise and weight loss would likely help this symptom, even if we were to check his testosterone level and found it to be mildly diminished.  Patient seems satisfied with this.    Mood changes  Been, patient not interested in medication therapy for this symptom, and did  that increasing his regular regimented exercise would likely help these symptoms.    Screening for HIV (human immunodeficiency virus)  Agrees to screening, when returns for surveillance labs  - HIV Antigen Antibody Combo Cascade; Future    Need for hepatitis C screening test  Agrees to screening, when returns for surveillance labs  - Hepatitis C Screen Reflex to HCV RNA Quant and Genotype; Future    Screening for prostate cancer    - Prostate Specific Antigen Screen; Future    Encounter for  "immunization  Also recommended at some point a tetanus booster, pneumonia vaccine, and another COVID vaccine dose, patient declines those today.  Consents to the Shingrix.  - ZOSTER RECOMBINANT ADJUVANTED (SHINGRIX)    Patient has been advised of split billing requirements and indicates understanding: Yes                  BMI  Estimated body mass index is 31.91 kg/m  as calculated from the following:    Height as of this encounter: 1.905 m (6' 3\").    Weight as of this encounter: 115.8 kg (255 lb 4.8 oz).   Weight management plan: Discussed healthy diet and exercise guidelines    Counseling  Appropriate preventive services were addressed with this patient via screening, questionnaire, or discussion as appropriate for fall prevention, nutrition, physical activity, Tobacco-use cessation, social engagement, weight loss and cognition.  Checklist reviewing preventive services available has been given to the patient.  Reviewed patient's diet, addressing concerns and/or questions.   He is at risk for psychosocial distress and has been provided with information to reduce risk.           Eddie Ohara is a 55 year old, presenting for the following:  Physical (Pt not fasting)           HPI          Advance Care Planning    Patient states has Health Care Directive and will send to [email or web address].        6/4/2025   General Health   How would you rate your overall physical health? (!) FAIR   Feel stress (tense, anxious, or unable to sleep) To some extent   (!) STRESS CONCERN      6/4/2025   Nutrition   Three or more servings of calcium each day? Yes   Diet: Low salt   How many servings of fruit and vegetables per day? (!) 2-3   How many sweetened beverages each day? (!) 3         6/4/2025   Exercise   Days per week of moderate/strenous exercise 5 days         6/4/2025   Social Factors   Frequency of gathering with friends or relatives More than three times a week   Worry food won't last until get money to buy more No "   Food not last or not have enough money for food? No   Do you have housing? (Housing is defined as stable permanent housing and does not include staying outside in a car, in a tent, in an abandoned building, in an overnight shelter, or couch-surfing.) Yes   Are you worried about losing your housing? No   Lack of transportation? No   Unable to get utilities (heat,electricity)? No         6/4/2025   Fall Risk   Fallen 2 or more times in the past year? No   Trouble with walking or balance? Yes   Gait Speed Test (Document in seconds) 3.37   Gait Speed Test Interpretation Less than or equal to 5.00 seconds - PASS          6/4/2025   Dental   Dentist two times every year? Yes         Today's PHQ-2 Score:       6/4/2025     1:59 PM   PHQ-2 ( 1999 [employer])   Q1: Little interest or pleasure in doing things 1   Q2: Feeling down, depressed or hopeless 1   PHQ-2 Score 2    Q1: Little interest or pleasure in doing things Several days   Q2: Feeling down, depressed or hopeless Several days   PHQ-2 Score 2       Patient-reported           6/4/2025   Substance Use   Alcohol more than 3/day or more than 7/wk No   Do you use any other substances recreationally? No     Social History     Tobacco Use    Smoking status: Never     Passive exposure: Never    Smokeless tobacco: Never   Vaping Use    Vaping status: Never Used   Substance Use Topics    Alcohol use: Yes     Alcohol/week: 0.0 standard drinks of alcohol     Comment: occasional    Drug use: Never             6/4/2025   One time HIV Screening   Previous HIV test? No         6/4/2025   STI Screening   New sexual partner(s) since last STI/HIV test? No   ASCVD Risk   The ASCVD Risk score (David OGLESBY, et al., 2019) failed to calculate for the following reasons:    Risk score cannot be calculated because patient has a medical history suggesting prior/existing ASCVD           Reviewed and updated as needed this visit by Provider                      Has been noticing increased  "acid reflux symptoms, and occasional solid food dysphagia, especially with meats and breads.  The dysphagia symptoms seem to be associated with acid reflux, and patient would like to avoid unnecessary testing/endoscopy if possible.  Has tried over-the-counter Tums which are partially effective at at least alleviating reflux symptoms.    Has also noticed some mild mood changes, occasionally feels a little bit down.  Is busy at work, not exercising regularly, overweight, etc.  He is not interested in medication therapy necessarily, certainly okay with natural therapies if appropriate.    Similarly, has noticed reduced sexual libido.  Erectile function seems intact when sex drive is okay, but seems to have low sex drive.  Once again, not looking for specific medications or unnecessary testing, and as above, is not exercising regularly and is overweight.            Review of Systems  Constitutional, HEENT, cardiovascular, pulmonary, gi and gu systems are negative, except as otherwise noted.     Objective    Exam  /70 (BP Location: Left arm, Patient Position: Sitting, Cuff Size: Adult Regular)   Pulse 81   Temp 98.1  F (36.7  C) (Temporal)   Resp 12   Ht 1.905 m (6' 3\")   Wt 115.8 kg (255 lb 4.8 oz)   SpO2 97%   BMI 31.91 kg/m     Estimated body mass index is 31.91 kg/m  as calculated from the following:    Height as of this encounter: 1.905 m (6' 3\").    Weight as of this encounter: 115.8 kg (255 lb 4.8 oz).    Physical Exam  GENERAL: alert and no distress  NECK: no adenopathy, no asymmetry, masses, or scars  RESP: lungs clear to auscultation - no rales, rhonchi or wheezes  CV: regular rate and rhythm, normal S1 S2, no S3 or S4, no murmur, click or rub, no peripheral edema  ABDOMEN: soft, nontender, no hepatosplenomegaly, no masses and bowel sounds normal  MS: no gross musculoskeletal defects noted, no edema        Signed Electronically by: Deejay De Los Santos MD    "

## 2025-06-16 ENCOUNTER — VIRTUAL VISIT (OUTPATIENT)
Dept: INTERNAL MEDICINE | Facility: CLINIC | Age: 55
End: 2025-06-16
Payer: COMMERCIAL

## 2025-06-16 DIAGNOSIS — F41.1 GENERALIZED ANXIETY DISORDER: Primary | ICD-10-CM

## 2025-06-16 PROCEDURE — 98006 SYNCH AUDIO-VIDEO EST MOD 30: CPT | Performed by: INTERNAL MEDICINE

## 2025-06-16 NOTE — PROGRESS NOTES
Lev is a 55 year old who is being evaluated via a billable video visit.    How would you like to obtain your AVS? MyChart  If the video visit is dropped, the invitation should be resent by: Text to cell phone: [phone]  Will anyone else be joining your video visit? No      Assessment & Plan     Generalized anxiety disorder  Has been having more stress with work, more anxiety, short temper, etc.  He was admittedly downplaying his symptoms at his last face-to-face visit with me.  After discussion, we will start SSRI therapy, administration and potential side effects reviewed with patient.  Follow-up with me virtually in 1 month to assess progress.  - sertraline (ZOLOFT) 50 MG tablet; Take 1 tablet (50 mg) by mouth daily.                Subjective   Lev is a 55 year old, presenting for the following health issues:  Depression    HPI     Discuss ssri for depression    As above.  Has been struggling with symptoms of anxiety, sometimes feeling down, short tempered with employees and family, disturbed sleep, etc.  Was admittedly downplaying his symptoms at his last face-to-face visit with me, and wife feels as if he would benefit from at least a trial of medication therapy.        Review of Systems  Constitutional, HEENT, cardiovascular, pulmonary, gi and gu systems are negative, except as otherwise noted.      Objective           Vitals:  No vitals were obtained today due to virtual visit.    Physical Exam   GENERAL: alert and no distress  EYES: Eyes grossly normal to inspection.  No discharge or erythema, or obvious scleral/conjunctival abnormalities.  RESP: No audible wheeze, cough, or visible cyanosis.    SKIN: Visible skin clear. No significant rash, abnormal pigmentation or lesions.  NEURO: Cranial nerves grossly intact.  Mentation and speech appropriate for age.  PSYCH: Appropriate affect, tone, and pace of words          Video-Visit Details    Type of service:  Video Visit   Originating Location (pt.  Location): Home    Distant Location (provider location):  On-site  Platform used for Video Visit: Chaim  Signed Electronically by: Deejay De Los Santos MD

## 2025-06-18 ENCOUNTER — LAB (OUTPATIENT)
Dept: LAB | Facility: CLINIC | Age: 55
End: 2025-06-18
Payer: COMMERCIAL

## 2025-06-18 DIAGNOSIS — Z11.59 NEED FOR HEPATITIS C SCREENING TEST: ICD-10-CM

## 2025-06-18 DIAGNOSIS — Z11.4 SCREENING FOR HIV (HUMAN IMMUNODEFICIENCY VIRUS): ICD-10-CM

## 2025-06-18 DIAGNOSIS — I10 BENIGN ESSENTIAL HYPERTENSION: ICD-10-CM

## 2025-06-18 DIAGNOSIS — Z12.5 SCREENING FOR PROSTATE CANCER: ICD-10-CM

## 2025-06-18 LAB
ALBUMIN SERPL BCG-MCNC: 4.6 G/DL (ref 3.5–5.2)
ALP SERPL-CCNC: 103 U/L (ref 40–150)
ALT SERPL W P-5'-P-CCNC: 55 U/L (ref 0–70)
ANION GAP SERPL CALCULATED.3IONS-SCNC: 9 MMOL/L (ref 7–15)
AST SERPL W P-5'-P-CCNC: 45 U/L (ref 0–45)
BILIRUB SERPL-MCNC: 0.7 MG/DL
BUN SERPL-MCNC: 19.4 MG/DL (ref 6–20)
CALCIUM SERPL-MCNC: 10.1 MG/DL (ref 8.8–10.4)
CHLORIDE SERPL-SCNC: 103 MMOL/L (ref 98–107)
CHOLEST SERPL-MCNC: 187 MG/DL
CREAT SERPL-MCNC: 1.08 MG/DL (ref 0.67–1.17)
EGFRCR SERPLBLD CKD-EPI 2021: 82 ML/MIN/1.73M2
FASTING STATUS PATIENT QL REPORTED: YES
FASTING STATUS PATIENT QL REPORTED: YES
GLUCOSE SERPL-MCNC: 107 MG/DL (ref 70–99)
HCO3 SERPL-SCNC: 26 MMOL/L (ref 22–29)
HCV AB SERPL QL IA: NONREACTIVE
HDLC SERPL-MCNC: 55 MG/DL
HIV 1+2 AB+HIV1 P24 AG SERPL QL IA: NONREACTIVE
LDLC SERPL CALC-MCNC: 108 MG/DL
NONHDLC SERPL-MCNC: 132 MG/DL
POTASSIUM SERPL-SCNC: 5.1 MMOL/L (ref 3.4–5.3)
PROT SERPL-MCNC: 7.8 G/DL (ref 6.4–8.3)
PSA SERPL DL<=0.01 NG/ML-MCNC: 1.4 NG/ML (ref 0–3.5)
SODIUM SERPL-SCNC: 138 MMOL/L (ref 135–145)
TRIGL SERPL-MCNC: 119 MG/DL

## 2025-06-25 DIAGNOSIS — I10 BENIGN ESSENTIAL HYPERTENSION: ICD-10-CM

## 2025-06-26 RX ORDER — AMLODIPINE BESYLATE 5 MG/1
5 TABLET ORAL DAILY
Qty: 90 TABLET | Refills: 2 | Status: SHIPPED | OUTPATIENT
Start: 2025-06-26

## 2025-06-26 RX ORDER — LISINOPRIL 20 MG/1
20 TABLET ORAL DAILY
Qty: 90 TABLET | Refills: 0 | OUTPATIENT
Start: 2025-06-26

## 2025-07-02 ENCOUNTER — RESULTS FOLLOW-UP (OUTPATIENT)
Dept: INTERNAL MEDICINE | Facility: CLINIC | Age: 55
End: 2025-07-02

## (undated) DEVICE — DRAPE SHEET REV FOLD 3/4 9349

## (undated) DEVICE — SPONGE COTTONOID 1/2X3" 20-07S

## (undated) DEVICE — SU PLAIN 4-0 SC-1 18" 1824H

## (undated) DEVICE — SU CHROMIC 4-0 RB-1 27" U203H

## (undated) DEVICE — SOL WATER IRRIG 1000ML BOTTLE 2F7114

## (undated) DEVICE — ADH LIQUID MASTISOL TOPICAL VIAL 2-3ML 0523-48

## (undated) DEVICE — BLADE KNIFE SURG 11 371111

## (undated) DEVICE — BAG DECANTER STERILE WHITE DYNJDEC09

## (undated) DEVICE — SU CHROMIC 4-0 M-1DA 744G

## (undated) DEVICE — SU ETHILON 3-0 PS-1 18" 1663H

## (undated) DEVICE — PEN MARKING W/RULER DYNJSM04

## (undated) DEVICE — SPLINT NASAL DOYLE BREATHEASY 20-10500

## (undated) DEVICE — EYE PREP BETADINE 5% SOLUTION 30ML 0065-0411-30

## (undated) DEVICE — SU PDS II 4-0 RB-1 27" Z304H

## (undated) DEVICE — ESU NDL COLORADO MICRO E1651

## (undated) DEVICE — BLADE KNIFE SURG 15 371115

## (undated) DEVICE — ESU GROUND PAD ADULT W/CORD E7507

## (undated) DEVICE — SU MONOCRYL 6-0 P-3 18" UND Y492G

## (undated) DEVICE — PACK NEURO MINOR UMMC SNE32MNMU4

## (undated) DEVICE — LINEN TOWEL PACK X5 5464

## (undated) DEVICE — SU PDS II 5-0 RB-1 DA 30" Z320H

## (undated) DEVICE — SOL NACL 0.9% IRRIG 1000ML BOTTLE 2F7124

## (undated) DEVICE — DRSG STERI STRIP 1/4X4" R1546

## (undated) DEVICE — SYR 01ML 27GA 0.5" NDL TBC 309623

## (undated) RX ORDER — PROPOFOL 10 MG/ML
INJECTION, EMULSION INTRAVENOUS
Status: DISPENSED
Start: 2019-11-26

## (undated) RX ORDER — FENTANYL CITRATE 50 UG/ML
INJECTION, SOLUTION INTRAMUSCULAR; INTRAVENOUS
Status: DISPENSED
Start: 2025-01-07

## (undated) RX ORDER — KETAMINE HCL IN 0.9 % NACL 50 MG/5 ML
SYRINGE (ML) INTRAVENOUS
Status: DISPENSED
Start: 2019-11-26

## (undated) RX ORDER — ONDANSETRON 2 MG/ML
INJECTION INTRAMUSCULAR; INTRAVENOUS
Status: DISPENSED
Start: 2019-11-26

## (undated) RX ORDER — CLINDAMYCIN PHOSPHATE 900 MG/50ML
INJECTION, SOLUTION INTRAVENOUS
Status: DISPENSED
Start: 2019-11-26

## (undated) RX ORDER — GLYCOPYRROLATE 0.2 MG/ML
INJECTION, SOLUTION INTRAMUSCULAR; INTRAVENOUS
Status: DISPENSED
Start: 2019-11-26

## (undated) RX ORDER — ACETAMINOPHEN 325 MG/1
TABLET ORAL
Status: DISPENSED
Start: 2019-11-26

## (undated) RX ORDER — FENTANYL CITRATE 50 UG/ML
INJECTION, SOLUTION INTRAMUSCULAR; INTRAVENOUS
Status: DISPENSED
Start: 2019-11-26

## (undated) RX ORDER — GABAPENTIN 100 MG/1
CAPSULE ORAL
Status: DISPENSED
Start: 2019-11-26

## (undated) RX ORDER — PHENYLEPHRINE HCL IN 0.9% NACL 1 MG/10 ML
SYRINGE (ML) INTRAVENOUS
Status: DISPENSED
Start: 2019-11-26

## (undated) RX ORDER — GABAPENTIN 300 MG/1
CAPSULE ORAL
Status: DISPENSED
Start: 2019-11-26

## (undated) RX ORDER — DEXAMETHASONE SODIUM PHOSPHATE 4 MG/ML
INJECTION, SOLUTION INTRA-ARTICULAR; INTRALESIONAL; INTRAMUSCULAR; INTRAVENOUS; SOFT TISSUE
Status: DISPENSED
Start: 2019-11-26

## (undated) RX ORDER — LABETALOL HYDROCHLORIDE 5 MG/ML
INJECTION, SOLUTION INTRAVENOUS
Status: DISPENSED
Start: 2019-11-26

## (undated) RX ORDER — OXYMETAZOLINE HYDROCHLORIDE 0.05 G/100ML
SPRAY NASAL
Status: DISPENSED
Start: 2019-11-26

## (undated) RX ORDER — MUPIROCIN 20 MG/G
OINTMENT TOPICAL
Status: DISPENSED
Start: 2019-11-26

## (undated) RX ORDER — LIDOCAINE HYDROCHLORIDE 20 MG/ML
INJECTION, SOLUTION EPIDURAL; INFILTRATION; INTRACAUDAL; PERINEURAL
Status: DISPENSED
Start: 2019-11-26

## (undated) RX ORDER — EPHEDRINE SULFATE 50 MG/ML
INJECTION, SOLUTION INTRAMUSCULAR; INTRAVENOUS; SUBCUTANEOUS
Status: DISPENSED
Start: 2019-11-26

## (undated) RX ORDER — LIDOCAINE HYDROCHLORIDE AND EPINEPHRINE 10; 10 MG/ML; UG/ML
INJECTION, SOLUTION INFILTRATION; PERINEURAL
Status: DISPENSED
Start: 2019-11-26